# Patient Record
Sex: MALE | Race: WHITE | NOT HISPANIC OR LATINO | Employment: OTHER | URBAN - METROPOLITAN AREA
[De-identification: names, ages, dates, MRNs, and addresses within clinical notes are randomized per-mention and may not be internally consistent; named-entity substitution may affect disease eponyms.]

---

## 2018-07-12 ENCOUNTER — TRANSFERRED RECORDS (OUTPATIENT)
Dept: HEALTH INFORMATION MANAGEMENT | Facility: CLINIC | Age: 73
End: 2018-07-12

## 2019-10-21 ENCOUNTER — TRANSFERRED RECORDS (OUTPATIENT)
Dept: HEALTH INFORMATION MANAGEMENT | Facility: CLINIC | Age: 74
End: 2019-10-21

## 2019-11-07 ENCOUNTER — TRANSFERRED RECORDS (OUTPATIENT)
Dept: HEALTH INFORMATION MANAGEMENT | Facility: CLINIC | Age: 74
End: 2019-11-07

## 2019-12-06 ENCOUNTER — TRANSFERRED RECORDS (OUTPATIENT)
Dept: HEALTH INFORMATION MANAGEMENT | Facility: CLINIC | Age: 74
End: 2019-12-06

## 2020-01-25 ENCOUNTER — TRANSFERRED RECORDS (OUTPATIENT)
Dept: HEALTH INFORMATION MANAGEMENT | Facility: CLINIC | Age: 75
End: 2020-01-25

## 2020-03-31 ENCOUNTER — TRANSFERRED RECORDS (OUTPATIENT)
Dept: HEALTH INFORMATION MANAGEMENT | Facility: CLINIC | Age: 75
End: 2020-03-31

## 2020-04-09 ENCOUNTER — TRANSFERRED RECORDS (OUTPATIENT)
Dept: HEALTH INFORMATION MANAGEMENT | Facility: CLINIC | Age: 75
End: 2020-04-09

## 2020-04-16 ENCOUNTER — TRANSFERRED RECORDS (OUTPATIENT)
Dept: HEALTH INFORMATION MANAGEMENT | Facility: CLINIC | Age: 75
End: 2020-04-16

## 2020-07-16 ENCOUNTER — TRANSFERRED RECORDS (OUTPATIENT)
Dept: HEALTH INFORMATION MANAGEMENT | Facility: CLINIC | Age: 75
End: 2020-07-16

## 2020-09-16 ENCOUNTER — TRANSFERRED RECORDS (OUTPATIENT)
Dept: HEALTH INFORMATION MANAGEMENT | Facility: CLINIC | Age: 75
End: 2020-09-16

## 2021-01-15 ENCOUNTER — TRANSFERRED RECORDS (OUTPATIENT)
Dept: HEALTH INFORMATION MANAGEMENT | Facility: CLINIC | Age: 76
End: 2021-01-15

## 2021-02-23 ENCOUNTER — TRANSFERRED RECORDS (OUTPATIENT)
Dept: HEALTH INFORMATION MANAGEMENT | Facility: CLINIC | Age: 76
End: 2021-02-23

## 2021-02-25 ENCOUNTER — TRANSFERRED RECORDS (OUTPATIENT)
Dept: HEALTH INFORMATION MANAGEMENT | Facility: CLINIC | Age: 76
End: 2021-02-25

## 2021-05-25 ENCOUNTER — RECORDS - HEALTHEAST (OUTPATIENT)
Dept: ADMINISTRATIVE | Facility: CLINIC | Age: 76
End: 2021-05-25

## 2021-06-01 ENCOUNTER — TRANSFERRED RECORDS (OUTPATIENT)
Dept: HEALTH INFORMATION MANAGEMENT | Facility: CLINIC | Age: 76
End: 2021-06-01

## 2021-06-02 ENCOUNTER — TRANSFERRED RECORDS (OUTPATIENT)
Dept: HEALTH INFORMATION MANAGEMENT | Facility: CLINIC | Age: 76
End: 2021-06-02

## 2021-07-26 ENCOUNTER — TRANSFERRED RECORDS (OUTPATIENT)
Dept: HEALTH INFORMATION MANAGEMENT | Facility: CLINIC | Age: 76
End: 2021-07-26

## 2021-09-27 LAB
CHOLESTEROL (EXTERNAL): 154 MG/DL
HDLC SERPL-MCNC: 35 MG/DL
NON HDL CHOLESTEROL (EXTERNAL): 119 MG/DL
TRIGLYCERIDES (EXTERNAL): 280 MG/DL

## 2021-10-11 ENCOUNTER — TRANSFERRED RECORDS (OUTPATIENT)
Dept: HEALTH INFORMATION MANAGEMENT | Facility: CLINIC | Age: 76
End: 2021-10-11

## 2022-05-20 ENCOUNTER — TRANSFERRED RECORDS (OUTPATIENT)
Dept: HEALTH INFORMATION MANAGEMENT | Facility: CLINIC | Age: 77
End: 2022-05-20

## 2022-07-26 ENCOUNTER — TRANSFERRED RECORDS (OUTPATIENT)
Dept: HEALTH INFORMATION MANAGEMENT | Facility: CLINIC | Age: 77
End: 2022-07-26

## 2022-10-20 ENCOUNTER — TRANSFERRED RECORDS (OUTPATIENT)
Dept: HEALTH INFORMATION MANAGEMENT | Facility: CLINIC | Age: 77
End: 2022-10-20

## 2022-10-22 LAB
CHOLESTEROL (EXTERNAL): 130 MG/DL
HDLC SERPL-MCNC: 36 MG/DL
LDL CHOLESTEROL DIRECT (EXTERNAL): 54 MG/DL
LDL CHOLESTEROL DIRECT (EXTERNAL): 54 MG/DL
NON HDL CHOLESTEROL (EXTERNAL): 95 MG/DL
TRIGLYCERIDES (EXTERNAL): 233 MG/DL

## 2023-01-13 ENCOUNTER — TRANSFERRED RECORDS (OUTPATIENT)
Dept: HEALTH INFORMATION MANAGEMENT | Facility: CLINIC | Age: 78
End: 2023-01-13

## 2023-01-17 LAB
CREATININE (EXTERNAL): 1.1 MG/DL (ref 0.7–1.2)
GFR ESTIMATED (EXTERNAL): >60 ML/MIN/1.73M2
GLUCOSE (EXTERNAL): 136 MG/DL (ref 64–109)
POTASSIUM (EXTERNAL): 3.6 MMOL/L (ref 3.5–5.1)

## 2023-03-22 ENCOUNTER — TRANSFERRED RECORDS (OUTPATIENT)
Dept: HEALTH INFORMATION MANAGEMENT | Facility: CLINIC | Age: 78
End: 2023-03-22

## 2023-06-19 LAB
ALT SERPL-CCNC: 12 U/L (ref 0–41)
AST SERPL-CCNC: 16 U/L (ref 0–37)
CREATININE (EXTERNAL): 0.9 MG/DL (ref 0.7–1.2)
GFR ESTIMATED (EXTERNAL): >60 ML/MIN/1.73M2
GLUCOSE (EXTERNAL): 101 MG/DL (ref 64–109)
POTASSIUM (EXTERNAL): 3.7 MMOL/L (ref 3.5–5.1)

## 2023-06-20 ENCOUNTER — TRANSFERRED RECORDS (OUTPATIENT)
Dept: HEALTH INFORMATION MANAGEMENT | Facility: CLINIC | Age: 78
End: 2023-06-20

## 2023-07-31 ENCOUNTER — TRANSCRIBE ORDERS (OUTPATIENT)
Dept: OTHER | Age: 78
End: 2023-07-31

## 2023-07-31 DIAGNOSIS — K52.9 CHRONIC DIARRHEA: Primary | ICD-10-CM

## 2023-08-01 ENCOUNTER — TELEPHONE (OUTPATIENT)
Dept: SURGERY | Facility: CLINIC | Age: 78
End: 2023-08-01
Payer: COMMERCIAL

## 2023-08-01 NOTE — TELEPHONE ENCOUNTER
Screening Questions  BLUE  KIND OF PREP RED  LOCATION [review exclusion criteria] GREEN  SEDATION TYPE        N Are you active on mychart?       Trevor  Ordering/Referring Provider?        Medica/Medicare What type of coverage do you have?      n Have you had a positive covid test in the last 14 days?     NOF 1. BMI  [BMI 40+ - review exclusion criteria& smart-phrase document]    Y  2. Are you able to give consent for your medical care? [IF NO,RN REVIEW]          N  3. Are you taking any prescription pain medications on a routine schedule   (ex narcotics: oxycodone, roxicodone, oxycontin,  and percocet)? [RN Review]        N  3a. EXTENDED PREP What kind of prescription?     N 4. Do you have any chemical dependencies such as alcohol, street drugs, or methadone?        **If yes 3- 5 , please schedule with MAC sedation.**          IF YES TO ANY 6 - 10 - HOSPITAL SETTING ONLY.     N 6.   Do you need assistance transferring?     N 7.   Have you had a heart or lung transplant?    N 8.   Are you currently on dialysis?   N 9.   Do you use daily home oxygen?   N 10. Do you take nitroglycerin?   10a. N If yes, how often?     N 11. Are you currently pregnant?    11a. N If yes, how many weeks? [ Greater than 12 weeks, OR NEEDED]    N 12. Do you have Pulmonary Hypertension? *NEED PAC APPT AT [facility] w/ MAC*     N 13. [review exclusion criteria]  Do you have any implantable devices in your body (pacemaker, defib, LVAD)?    N 14. In the past 6 months, have you had any heart related issues including cardiomyopathy or heart attack?     14a. N If yes, did it require cardiac stenting if so when?     N 15. Have you had a stroke or Transient ischemic attack (TIA - aka  mini stroke ) within 6 months?      N 16. Do you have mod to severe Obstructive Sleep Apnea?  [Hospital only]    N 17. Do you have SEVERE AND UNCONTROLLED asthma? *NEED PAC APPT AT [facility] w/MAC*     18.Do you take blood thinners?  No      N 19. Do you take any of the  "following medications?    NPhentermine     NWegovy (Semaglutide)      N  21. Do you have a diagnosis of diabetes?      nOzempic  nTrulicity    n*Bydureon (Exertidate ER)    nMounjaro (Tirzepatdine)    nRybelsus     19a. If yes, \"Hold for 7 days before procedure.  Please consult your prescribing provider if you have questions about holding this medication.\"     HOLD THE FOLLOWING THE DAY BEFORE AND THE DAY OFF:        NByetta (Exenatide)    nSaxano-Victoza (Linaglutied)      N  20. Do you have chronic kidney disease?      N  22. On a regular basis do you go 3-5 days between bowel movements?     See below 23. Preferred LOCAL Pharmacy for Pre Prescription      [facility]        - CLOSING REMINDERS -  You will receive a call from a Nurse to review instructions and health history.  This assessment must be completed prior to your procedure.  Failure to complete the Nurse assessment may result in the procedure being cancelled.    On the day of your procedure, please designatean adult(s) who can drive you home stay with you for the next 24 hours. The medicines used in the exam will make you sleepy. You will not be able to drive.    You cannot take public transportation, ride share services, or non-medical taxi service without a responsible caregiver.  Medical transport services are allowed with the requirement that a responsible caregiver will receive you at your destination.  We require that drivers and caregivers are confirmed prior to your procedure.      - SCHEDULING DETAILS -  N & N Hospital Setting Required & If yes, what is the exclusion?   Lyons  Surgeon    8-16-23  Date of Procedure  Lower Endoscopy [Colonoscopy]  Type of Procedure Scheduled  [facility]   STANDARD GOLYTELY- If you answer yes to questions #8, #20, #21 [  pts ]Which Colonoscopy Prep was Sent?     GEN Sedation Type     N PAC / Pre-op Required     Weight and Height not listed mailing Standard " Prep

## 2023-08-11 RX ORDER — BISACODYL 5 MG/1
TABLET, DELAYED RELEASE ORAL
Qty: 4 TABLET | Refills: 0 | Status: SHIPPED | OUTPATIENT
Start: 2023-08-11 | End: 2024-01-03

## 2023-08-15 ENCOUNTER — ANESTHESIA EVENT (OUTPATIENT)
Dept: GASTROENTEROLOGY | Facility: CLINIC | Age: 78
End: 2023-08-15
Payer: COMMERCIAL

## 2023-08-15 ASSESSMENT — COPD QUESTIONNAIRES: COPD: 1

## 2023-08-15 NOTE — ANESTHESIA PREPROCEDURE EVALUATION
Anesthesia Pre-Procedure Evaluation    Patient: Peña Hall   MRN: [id number] : 1945        Procedure : Procedure(s):  Colonoscopy          No past medical history on file.   No past surgical history on file.   No Known Allergies   Social History     Tobacco Use    Smoking status: Not on file    Smokeless tobacco: Not on file   Substance Use Topics    Alcohol use: Not on file      Wt Readings from Last 1 Encounters:   No data found for Wt        Anesthesia Evaluation   Pt has had prior anesthetic. Type: General, MAC and Regional.        ROS/MED HX  ENT/Pulmonary:     (+)     DALE risk factors,                    COPD,              Neurologic:       Cardiovascular:     (+) Dyslipidemia hypertension- -   -  - -           HENRY.                           METS/Exercise Tolerance:     Hematologic:       Musculoskeletal: Comment: DDD    (+)  arthritis,             GI/Hepatic:       Renal/Genitourinary:       Endo:     (+)               Obesity,       Psychiatric/Substance Use:       Infectious Disease:       Malignancy:   (+) Malignancy,     Other:            Physical Exam    Airway        Mallampati: I   TM distance: > 3 FB   Neck ROM: full     Respiratory Devices and Support         Dental       (+) Multiple visibly decayed, broken teeth      Cardiovascular   cardiovascular exam normal       Rhythm and rate: regular and normal     Pulmonary           breath sounds clear to auscultation           OUTSIDE LABS:  CBC: No results found for: WBC, HGB, HCT, PLT  BMP: No results found for: NA, POTASSIUM, CHLORIDE, CO2, BUN, CR, GLC  COAGS: No results found for: PTT, INR, FIBR  POC: No results found for: BGM, HCG, HCGS  HEPATIC: No results found for: ALBUMIN, PROTTOTAL, ALT, AST, GGT, ALKPHOS, BILITOTAL, BILIDIRECT, EZE  OTHER: No results found for: PH, LACT, A1C, SARAVANAN, PHOS, MAG, LIPASE, AMYLASE, TSH, T4, T3, CRP, SED    Anesthesia Plan    ASA Status:  3    NPO Status:  NPO Appropriate    Anesthesia Type: General.      - Airway: Native airway   Induction: Propofol.   Maintenance: TIVA.        Consents    Anesthesia Plan(s) and associated risks, benefits, and realistic alternatives discussed. Questions answered and patient/representative(s) expressed understanding.     - Discussed: Risks, Benefits and Alternatives for BOTH SEDATION and the PROCEDURE were discussed     - Discussed with:  Patient      - Extended Intubation/Ventilatory Support Discussed: No.      - Patient is DNR/DNI Status: No     Use of blood products discussed: No .     Postoperative Care            Comments:                YECENIA Adhikari CRNA

## 2023-08-18 ENCOUNTER — HOSPITAL ENCOUNTER (OUTPATIENT)
Facility: CLINIC | Age: 78
Discharge: HOME | End: 2023-08-18
Attending: SURGERY | Admitting: SURGERY
Payer: COMMERCIAL

## 2023-08-18 ENCOUNTER — ANESTHESIA (OUTPATIENT)
Dept: GASTROENTEROLOGY | Facility: CLINIC | Age: 78
End: 2023-08-18
Payer: COMMERCIAL

## 2023-08-18 VITALS
BODY MASS INDEX: 31.08 KG/M2 | OXYGEN SATURATION: 96 % | WEIGHT: 198 LBS | RESPIRATION RATE: 16 BRPM | HEART RATE: 65 BPM | DIASTOLIC BLOOD PRESSURE: 70 MMHG | SYSTOLIC BLOOD PRESSURE: 129 MMHG | HEIGHT: 67 IN | TEMPERATURE: 97.6 F

## 2023-08-18 DIAGNOSIS — Z12.11 SPECIAL SCREENING FOR MALIGNANT NEOPLASMS, COLON: Primary | ICD-10-CM

## 2023-08-18 LAB — COLONOSCOPY: NORMAL

## 2023-08-18 PROCEDURE — 45380 COLONOSCOPY AND BIOPSY: CPT

## 2023-08-18 PROCEDURE — 88305 TISSUE EXAM BY PATHOLOGIST: CPT | Mod: TC | Performed by: SURGERY

## 2023-08-18 PROCEDURE — 45385 COLONOSCOPY W/LESION REMOVAL: CPT | Performed by: SURGERY

## 2023-08-18 PROCEDURE — 45380 COLONOSCOPY AND BIOPSY: CPT | Mod: 59 | Performed by: SURGERY

## 2023-08-18 PROCEDURE — 250N000011 HC RX IP 250 OP 636: Performed by: NURSE ANESTHETIST, CERTIFIED REGISTERED

## 2023-08-18 PROCEDURE — 250N000009 HC RX 250: Performed by: SURGERY

## 2023-08-18 PROCEDURE — 45380 COLONOSCOPY AND BIOPSY: CPT | Performed by: SURGERY

## 2023-08-18 PROCEDURE — 45385 COLONOSCOPY W/LESION REMOVAL: CPT | Mod: PT | Performed by: SURGERY

## 2023-08-18 PROCEDURE — 370N000017 HC ANESTHESIA TECHNICAL FEE, PER MIN: Performed by: SURGERY

## 2023-08-18 PROCEDURE — 258N000003 HC RX IP 258 OP 636: Performed by: SURGERY

## 2023-08-18 RX ORDER — HYDROCHLOROTHIAZIDE 25 MG/1
1 TABLET ORAL DAILY
COMMUNITY
Start: 2022-11-01 | End: 2024-01-03

## 2023-08-18 RX ORDER — TAMSULOSIN HYDROCHLORIDE 0.4 MG/1
0.8 CAPSULE ORAL DAILY
COMMUNITY
Start: 2022-11-01 | End: 2024-01-03

## 2023-08-18 RX ORDER — PROPOFOL 10 MG/ML
INJECTION, EMULSION INTRAVENOUS CONTINUOUS PRN
Status: DISCONTINUED | OUTPATIENT
Start: 2023-08-18 | End: 2023-08-18

## 2023-08-18 RX ORDER — ONDANSETRON 2 MG/ML
4 INJECTION INTRAMUSCULAR; INTRAVENOUS EVERY 30 MIN PRN
Status: DISCONTINUED | OUTPATIENT
Start: 2023-08-18 | End: 2023-08-18 | Stop reason: HOSPADM

## 2023-08-18 RX ORDER — LIDOCAINE 40 MG/G
CREAM TOPICAL
Status: DISCONTINUED | OUTPATIENT
Start: 2023-08-18 | End: 2023-08-18 | Stop reason: HOSPADM

## 2023-08-18 RX ORDER — ATORVASTATIN CALCIUM 20 MG/1
1 TABLET, FILM COATED ORAL EVERY EVENING
COMMUNITY
Start: 2022-11-01 | End: 2024-01-03

## 2023-08-18 RX ORDER — PROPRANOLOL HYDROCHLORIDE 10 MG/1
10 TABLET ORAL
Status: ON HOLD | COMMUNITY
Start: 2022-11-01 | End: 2023-10-22

## 2023-08-18 RX ORDER — MELOXICAM 15 MG/1
1 TABLET ORAL DAILY
COMMUNITY
Start: 2022-11-01 | End: 2024-01-19

## 2023-08-18 RX ORDER — SODIUM CHLORIDE, SODIUM LACTATE, POTASSIUM CHLORIDE, CALCIUM CHLORIDE 600; 310; 30; 20 MG/100ML; MG/100ML; MG/100ML; MG/100ML
INJECTION, SOLUTION INTRAVENOUS CONTINUOUS
Status: DISCONTINUED | OUTPATIENT
Start: 2023-08-18 | End: 2023-08-18 | Stop reason: HOSPADM

## 2023-08-18 RX ORDER — GABAPENTIN 300 MG/1
1 CAPSULE ORAL EVERY EVENING
Status: ON HOLD | COMMUNITY
Start: 2022-11-01 | End: 2023-10-22

## 2023-08-18 RX ORDER — ONDANSETRON 4 MG/1
4 TABLET, ORALLY DISINTEGRATING ORAL EVERY 30 MIN PRN
Status: DISCONTINUED | OUTPATIENT
Start: 2023-08-18 | End: 2023-08-18 | Stop reason: HOSPADM

## 2023-08-18 RX ORDER — ALBUTEROL SULFATE 90 UG/1
AEROSOL, METERED RESPIRATORY (INHALATION)
COMMUNITY
Start: 2021-10-08 | End: 2024-01-03

## 2023-08-18 RX ORDER — GABAPENTIN 100 MG/1
100 CAPSULE ORAL EVERY MORNING
COMMUNITY
Start: 2022-11-01 | End: 2024-01-03

## 2023-08-18 RX ORDER — LISINOPRIL 40 MG/1
1 TABLET ORAL DAILY
COMMUNITY
Start: 2022-11-01 | End: 2024-01-03

## 2023-08-18 RX ADMIN — PROPOFOL 150 MCG/KG/MIN: 10 INJECTION, EMULSION INTRAVENOUS at 10:50

## 2023-08-18 RX ADMIN — LIDOCAINE HYDROCHLORIDE 50 MG: 10 INJECTION, SOLUTION EPIDURAL; INFILTRATION; INTRACAUDAL; PERINEURAL at 10:50

## 2023-08-18 RX ADMIN — SODIUM CHLORIDE, POTASSIUM CHLORIDE, SODIUM LACTATE AND CALCIUM CHLORIDE: 600; 310; 30; 20 INJECTION, SOLUTION INTRAVENOUS at 10:30

## 2023-08-18 RX ADMIN — LIDOCAINE HYDROCHLORIDE 0.1 ML: 10 INJECTION, SOLUTION EPIDURAL; INFILTRATION; INTRACAUDAL; PERINEURAL at 10:30

## 2023-08-18 ASSESSMENT — ACTIVITIES OF DAILY LIVING (ADL): ADLS_ACUITY_SCORE: 35

## 2023-08-18 NOTE — ANESTHESIA POSTPROCEDURE EVALUATION
Patient: Peña Hall    Procedure: Procedure(s):  COLONOSCOPY, WITH POLYPECTOMY AND BIOPSY  COLONOSCOPY, FLEXIBLE, WITH LESION REMOVAL USING SNARE       Anesthesia Type:  General    Note:  Disposition: Outpatient   Postop Pain Control: Uneventful            Sign Out: Well controlled pain   PONV: No   Neuro/Psych: Uneventful            Sign Out: Acceptable/Baseline neuro status   Airway/Respiratory: Uneventful            Sign Out: Acceptable/Baseline resp. status   CV/Hemodynamics: Uneventful            Sign Out: Acceptable CV status; No obvious hypovolemia; No obvious fluid overload   Other NRE: NONE   DID A NON-ROUTINE EVENT OCCUR? No           Last vitals:  Vitals Value Taken Time   /70 08/18/23 1140   Temp 36.4  C (97.6  F) 08/18/23 1121   Pulse 65 08/18/23 1140   Resp 16 08/18/23 1121   SpO2 93 % 08/18/23 1143   Vitals shown include unvalidated device data.    Electronically Signed By: YECENIA Pereira CRNA  August 18, 2023  11:48 AM

## 2023-08-18 NOTE — LETTER
Peña Hall  [id number] [facility] [address]    August 22, 2023    Dear Peña,  This letter is written to inform you of the results of your recent colonoscopy.  Your examination showed polyp(s) in your descending colon. All polyps were removed in their entirety and sent for review by a pathologist. As you will see on the pathology report below, the tissue(s) were tubular adenomatous polyps. Your examination was otherwise without abnormality, however, biopsies were done of the entire colon if there is any underlying cause for your diarrhea.  Final pathology did not show any abnormalities.    A: Large intestine, random, biopsies:  -  Normal colonic mucosa showing no evidence of collagenous or lymphocytic (microscopic) colitis, active or chronic colitis or other microscopic abnormality     B: Large intestine, descending, polypectomy:  -Focal adenomatous change consistent with tubular adenoma, no evidence of high-grade dysplasia or malignancy  -Majority of tissue sample is nonneoplastic colonic mucosa showing no evidence of lymphocytic colitis, collagenous colitis, active or chronic colitis    Adenomatous polyps are entirely benign (non-cancerous); however, patients who have developed these polyps are at an increased risk for developing additional polyps in the future. If these are not eventually removed, there is a risk of developing colon cancer. We will advise more frequent examinations with you because of the risk associated with this type of polyp.    Given these findings, your personal history of colon polyps and colon cancer, I recommend that you undergo a repeat colonoscopy in 5 year(s) for surveillance. We will enter you into a recall system so you receive a reminder closer to the time that you are due for repeat examination.     Please remember that this recommendation is made with the understanding that you are not experiencing persistent changes in bowel function, bleeding per rectum, and/or  significant abdominal pain. If you experience these symptoms, please contact your primary care provider for a further evaluation.     If you have any questions or concerns about the results of your colonoscopy or the appropriate follow-up, please contact my assistant at [phone].    Sincerely,        [facility] DO Lyons FACOS [facility]  ___

## 2023-08-18 NOTE — ANESTHESIA CARE TRANSFER NOTE
Patient: Peña Hall    Procedure: Procedure(s):  COLONOSCOPY, WITH POLYPECTOMY AND BIOPSY  COLONOSCOPY, FLEXIBLE, WITH LESION REMOVAL USING SNARE       Diagnosis: Chronic diarrhea [K52.9]  Diagnosis Additional Information: No value filed.    Anesthesia Type:   General     Note:    Oropharynx: oropharynx clear of all foreign objects  Level of Consciousness: awake  Oxygen Supplementation: room air    Independent Airway: airway patency satisfactory and stable  Dentition: dentition unchanged  Vital Signs Stable: post-procedure vital signs reviewed and stable  Report to RN Given: handoff report given  Patient transferred to: Phase II    Handoff Report: Identifed the Patient, Identified the Reponsible Provider, Reviewed the pertinent medical history, Discussed the surgical course, Reviewed Intra-OP anesthesia mangement and issues during anesthesia, Set expectations for post-procedure period and Allowed opportunity for questions and acknowledgement of understanding      Vitals:  Vitals Value Taken Time   /56 08/18/23 1120   Temp     Pulse 62 08/18/23 1120   Resp     SpO2 96 % 08/18/23 1126   Vitals shown include unvalidated device data.    Electronically Signed By: YECENIA Pereira CRNA  August 18, 2023  11:26 AM

## 2023-08-18 NOTE — H&P
[facility]    Pre-Endoscopy History and Physical     Peña Hall MRN# [id number]   YOB: 1945 Age: 78 year old     Date of Procedure: 8/18/2023  Primary care provider: Marli Lyons  Type of Endoscopy: Colonoscopy with possible biopsy, possible polypectomy  Reason for Procedure: screening/diarrhea  Type of Anesthesia Anticipated: MAC    HPI:    Peña is a 78 year old male who will be undergoing the above procedure.  1st screening?; no blood thinner; chronic diarrhea; no famhx of colon cancer    A history and physical has been performed. The patient's medications and allergies have been reviewed. The risks and benefits of the procedure and the sedation options and risks were discussed with the patient.  All questions were answered and informed consent was obtained.      He denies a personal or family history of anesthesia complications or bleeding disorders.     There is no problem list on file for this patient.       No past medical history on file.     History reviewed. No pertinent surgical history.    Social History     Tobacco Use    Smoking status: Not on file    Smokeless tobacco: Not on file   Substance Use Topics    Alcohol use: Not on file       History reviewed. No pertinent family history.    Prior to Admission medications    Medication Sig Start Date End Date Taking? Authorizing Provider   albuterol (PROAIR HFA/PROVENTIL HFA/VENTOLIN HFA) 108 (90 Base) MCG/ACT inhaler Inhale 2 puff using inhaler every four hours as needed for wheezing. pretreat before activity 10/8/21  Yes Reported, Patient   atorvastatin (LIPITOR) 20 MG tablet Take 1 tablet by mouth every evening 11/1/22  Yes Reported, Patient   bisacodyl (DULCOLAX) 5 MG EC tablet Take 2 tablets at 3 pm the day before your procedure. If your procedure is before 11 am, take 2 additional tablets at 11 pm. If your procedure is after 11 am, take 2 additional tablets at 6 am. For additional instructions refer to your  "colonoscopy prep instructions. 8/11/23  Yes Marli Lyons MD   gabapentin (NEURONTIN) 100 MG capsule Take 100 mg by mouth 11/1/22  Yes Reported, Patient   gabapentin (NEURONTIN) 300 MG capsule Take 1 capsule by mouth every evening 11/1/22  Yes Reported, Patient   hydrochlorothiazide (HYDRODIURIL) 25 MG tablet Take 1 tablet by mouth daily 11/1/22  Yes Reported, Patient   lisinopril (ZESTRIL) 40 MG tablet Take 1 tablet by mouth daily 11/1/22  Yes Reported, Patient   meloxicam (MOBIC) 15 MG tablet Take 1 tablet by mouth daily 11/1/22  Yes Reported, Patient   omeprazole (PRILOSEC) 20 MG DR capsule Take 20 mg by mouth 10/8/21  Yes Reported, Patient   polyethylene glycol (GOLYTELY) 236 g suspension The night before the exam at 6 pm drink an 8-ounce glass every 15 minutes until the jug is half empty. If you arrive before 11 AM: Drink the other half of the Golytely jug at 11 PM night before procedure. If you arrive after 11 AM: Drink the other half of the Golytely jug at 6 AM day of procedure. For additional instructions refer to your colonoscopy prep instructions. 8/11/23  Yes Marli Lyons MD   propranolol (INDERAL) 10 MG tablet Take 10 mg by mouth 11/1/22  Yes Reported, Patient   tamsulosin (FLOMAX) 0.4 MG capsule Take 0.8 mg by mouth 11/1/22  Yes Reported, Patient       No Known Allergies     REVIEW OF SYSTEMS:   5 point ROS negative except as noted above in HPI, including Gen., Resp., CV, GI &  system review.    PHYSICAL EXAM:   BP (!) 153/86   Pulse 68   Temp 98.7  F (37.1  C) (Oral)   Resp 17   Ht 1.689 m (5' 6.5\")   Wt 89.8 kg (198 lb)   SpO2 97%   BMI 31.48 kg/m   Estimated body mass index is 31.48 kg/m  as calculated from the following:    Height as of this encounter: 1.689 m (5' 6.5\").    Weight as of this encounter: 89.8 kg (198 lb).   Constitutional: Awake, alert, no acute distress.  Eyes: No scleral icterus.  Conjunctiva are without injection.  ENMT: Mucous membranes moist, dentition and gums are " intact.   Neck: Soft, supple, trachea midline.    Endocrine: n/a   Lymphatic: There is no cervical, submandibularadenopathy.  Respiratory: normal effortgs   Cardiovascular: S1, S2  Abdomen: Non-distended, non-tender,  No masses,  Musculoskeletal: No spinal or CVA tenderness. Full range of motion in the upper and lower extremities.    Skin: No skin rashes or lesions to inspection.  No petechia.    Neurologic: alerted and oriented 3x  Psychiatric: The patient's affect is not blunted and mood is appropriate.  DIAGNOSTICS:    Not indicated    IMPRESSION   ASA Class 2 - Mild systemic disease    PLAN:   Plan for Colonoscopy with possible biopsy, possible polypectomy. We discussed the risks, benefits and alternatives and the patient wished to proceed.  Patient is cleared for the above procedure.    The above has been forwarded to the consulting provider.    [facility]

## 2023-08-21 LAB
PATH REPORT.COMMENTS IMP SPEC: NORMAL
PATH REPORT.COMMENTS IMP SPEC: NORMAL
PATH REPORT.FINAL DX SPEC: NORMAL
PATH REPORT.GROSS SPEC: NORMAL
PATH REPORT.MICROSCOPIC SPEC OTHER STN: NORMAL
PATH REPORT.RELEVANT HX SPEC: NORMAL
PHOTO IMAGE: NORMAL

## 2023-08-21 PROCEDURE — 88305 TISSUE EXAM BY PATHOLOGIST: CPT | Mod: 26 | Performed by: PATHOLOGY

## 2023-10-11 ENCOUNTER — ALLIED HEALTH/NURSE VISIT (OUTPATIENT)
Dept: FAMILY MEDICINE | Facility: CLINIC | Age: 78
End: 2023-10-11
Payer: COMMERCIAL

## 2023-10-11 DIAGNOSIS — Z23 HIGH PRIORITY FOR 2019-NCOV VACCINE: ICD-10-CM

## 2023-10-11 DIAGNOSIS — Z23 NEED FOR PROPHYLACTIC VACCINATION AND INOCULATION AGAINST INFLUENZA: Primary | ICD-10-CM

## 2023-10-11 PROCEDURE — 90480 ADMN SARSCOV2 VAC 1/ONLY CMP: CPT

## 2023-10-11 PROCEDURE — G0008 ADMIN INFLUENZA VIRUS VAC: HCPCS | Mod: 59

## 2023-10-11 PROCEDURE — 99207 PR NO CHARGE NURSE ONLY: CPT

## 2023-10-11 PROCEDURE — 91320 SARSCV2 VAC 30MCG TRS-SUC IM: CPT

## 2023-10-11 PROCEDURE — 90662 IIV NO PRSV INCREASED AG IM: CPT

## 2023-10-11 NOTE — PROGRESS NOTES
Prior to immunization administration, verified patients identity using patient s name and date of birth. Please see Immunization Activity for additional information.     Screening Questionnaire for Adult Immunization    Are you sick today?   No   Do you have allergies to medications, food, a vaccine component or latex?   No   Have you ever had a serious reaction after receiving a vaccination?   No   Do you have a long-term health problem with heart, lung, kidney, or metabolic disease (e.g., diabetes), asthma, a blood disorder, no spleen, complement component deficiency, a cochlear implant, or a spinal fluid leak?  Are you on long-term aspirin therapy?   No   Do you have cancer, leukemia, HIV/AIDS, or any other immune system problem?   No   Do you have a parent, brother, or sister with an immune system problem?   No   In the past 3 months, have you taken medications that affect  your immune system, such as prednisone, other steroids, or anticancer drugs; drugs for the treatment of rheumatoid arthritis, Crohn s disease, or psoriasis; or have you had radiation treatments?   No   Have you had a seizure, or a brain or other nervous system problem?   No   During the past year, have you received a transfusion of blood or blood    products, or been given immune (gamma) globulin or antiviral drug?   No   For women: Are you pregnant or is there a chance you could become       pregnant during the next month?   No   Have you received any vaccinations in the past 4 weeks?   No     Immunization questionnaire answers were all negative.    I have reviewed the following standing orders:   This patient is due and qualifies for the Covid-19 vaccine.     Click here for COVID-19 Standing Order    I have reviewed the vaccines inclusion and exclusion criteria; No concerns regarding eligibility.     This patient is due and qualifies for the Influenza vaccine.    Click here for Influenza Vaccine Standing Order    I have reviewed the  vaccines inclusion and exclusion criteria; No concerns regarding eligibility.     Patient instructed to remain in clinic for 15 minutes afterwards, and to report any adverse reactions.     Screening performed by Bailee Kahler on 10/11/2023 at 9:43 AM.

## 2023-10-22 ENCOUNTER — APPOINTMENT (OUTPATIENT)
Dept: GENERAL RADIOLOGY | Facility: CLINIC | Age: 78
DRG: 389 | End: 2023-10-22
Attending: EMERGENCY MEDICINE
Payer: COMMERCIAL

## 2023-10-22 ENCOUNTER — HOSPITAL ENCOUNTER (INPATIENT)
Facility: CLINIC | Age: 78
LOS: 1 days | Discharge: HOME | DRG: 389 | End: 2023-10-23
Attending: EMERGENCY MEDICINE | Admitting: HOSPITALIST
Payer: COMMERCIAL

## 2023-10-22 ENCOUNTER — APPOINTMENT (OUTPATIENT)
Dept: CT IMAGING | Facility: CLINIC | Age: 78
DRG: 389 | End: 2023-10-22
Attending: EMERGENCY MEDICINE
Payer: COMMERCIAL

## 2023-10-22 DIAGNOSIS — N28.9 RENAL INSUFFICIENCY: ICD-10-CM

## 2023-10-22 DIAGNOSIS — K56.609 SBO (SMALL BOWEL OBSTRUCTION) (H): ICD-10-CM

## 2023-10-22 DIAGNOSIS — E87.6 HYPOKALEMIA: ICD-10-CM

## 2023-10-22 PROBLEM — I10 HTN (HYPERTENSION): Status: ACTIVE | Noted: 2023-10-22

## 2023-10-22 PROBLEM — E78.5 HYPERLIPIDEMIA LDL GOAL <130: Status: ACTIVE | Noted: 2023-10-22

## 2023-10-22 PROBLEM — N40.0 BPH (BENIGN PROSTATIC HYPERPLASIA): Status: ACTIVE | Noted: 2023-10-22

## 2023-10-22 PROBLEM — G89.29 CHRONIC MIDLINE LOW BACK PAIN WITHOUT SCIATICA: Chronic | Status: ACTIVE | Noted: 2023-10-22

## 2023-10-22 PROBLEM — K52.9 CHRONIC DIARRHEA: Chronic | Status: ACTIVE | Noted: 2023-10-22

## 2023-10-22 PROBLEM — M54.50 CHRONIC MIDLINE LOW BACK PAIN WITHOUT SCIATICA: Chronic | Status: ACTIVE | Noted: 2023-10-22

## 2023-10-22 PROBLEM — C18.9 COLON CANCER (H): Status: ACTIVE | Noted: 2023-10-22

## 2023-10-22 LAB
ALBUMIN SERPL BCG-MCNC: 4.1 G/DL (ref 3.5–5.2)
ALP SERPL-CCNC: 67 U/L (ref 40–129)
ALT SERPL W P-5'-P-CCNC: 19 U/L (ref 0–70)
ANION GAP SERPL CALCULATED.3IONS-SCNC: 16 MMOL/L (ref 7–15)
ANION GAP SERPL CALCULATED.3IONS-SCNC: 9 MMOL/L (ref 7–15)
AST SERPL W P-5'-P-CCNC: 27 U/L (ref 0–45)
BASO+EOS+MONOS # BLD AUTO: ABNORMAL 10*3/UL
BASO+EOS+MONOS NFR BLD AUTO: ABNORMAL %
BASOPHILS # BLD AUTO: 0 10E3/UL (ref 0–0.2)
BASOPHILS NFR BLD AUTO: 0 %
BILIRUB SERPL-MCNC: 0.7 MG/DL
BUN SERPL-MCNC: 32.1 MG/DL (ref 8–23)
BUN SERPL-MCNC: 33 MG/DL (ref 8–23)
CALCIUM SERPL-MCNC: 7.7 MG/DL (ref 8.8–10.2)
CALCIUM SERPL-MCNC: 8.5 MG/DL (ref 8.8–10.2)
CHLORIDE SERPL-SCNC: 90 MMOL/L (ref 98–107)
CHLORIDE SERPL-SCNC: 93 MMOL/L (ref 98–107)
CREAT SERPL-MCNC: 1.24 MG/DL (ref 0.67–1.17)
CREAT SERPL-MCNC: 1.37 MG/DL (ref 0.67–1.17)
DEPRECATED HCO3 PLAS-SCNC: 26 MMOL/L (ref 22–29)
DEPRECATED HCO3 PLAS-SCNC: 28 MMOL/L (ref 22–29)
EGFRCR SERPLBLD CKD-EPI 2021: 53 ML/MIN/1.73M2
EGFRCR SERPLBLD CKD-EPI 2021: 60 ML/MIN/1.73M2
EOSINOPHIL # BLD AUTO: 0.1 10E3/UL (ref 0–0.7)
EOSINOPHIL NFR BLD AUTO: 1 %
ERYTHROCYTE [DISTWIDTH] IN BLOOD BY AUTOMATED COUNT: 13.2 % (ref 10–15)
ERYTHROCYTE [DISTWIDTH] IN BLOOD BY AUTOMATED COUNT: 13.3 % (ref 10–15)
GLUCOSE SERPL-MCNC: 122 MG/DL (ref 70–99)
GLUCOSE SERPL-MCNC: 140 MG/DL (ref 70–99)
HCT VFR BLD AUTO: 35.6 % (ref 40–53)
HCT VFR BLD AUTO: 44.7 % (ref 40–53)
HGB BLD-MCNC: 12.4 G/DL (ref 13.3–17.7)
HGB BLD-MCNC: 15.3 G/DL (ref 13.3–17.7)
IMM GRANULOCYTES # BLD: 0 10E3/UL
IMM GRANULOCYTES NFR BLD: 0 %
LACTATE SERPL-SCNC: 1.9 MMOL/L (ref 0.7–2)
LACTATE SERPL-SCNC: 2.1 MMOL/L (ref 0.7–2)
LYMPHOCYTES # BLD AUTO: 0.4 10E3/UL (ref 0.8–5.3)
LYMPHOCYTES NFR BLD AUTO: 6 %
MCH RBC QN AUTO: 30.8 PG (ref 26.5–33)
MCH RBC QN AUTO: 31.1 PG (ref 26.5–33)
MCHC RBC AUTO-ENTMCNC: 34.2 G/DL (ref 31.5–36.5)
MCHC RBC AUTO-ENTMCNC: 34.8 G/DL (ref 31.5–36.5)
MCV RBC AUTO: 89 FL (ref 78–100)
MCV RBC AUTO: 90 FL (ref 78–100)
MONOCYTES # BLD AUTO: 0.1 10E3/UL (ref 0–1.3)
MONOCYTES NFR BLD AUTO: 2 %
NEUTROPHILS # BLD AUTO: 6.1 10E3/UL (ref 1.6–8.3)
NEUTROPHILS NFR BLD AUTO: 91 %
NRBC # BLD AUTO: 0 10E3/UL
NRBC BLD AUTO-RTO: 0 /100
PLATELET # BLD AUTO: 160 10E3/UL (ref 150–450)
PLATELET # BLD AUTO: 206 10E3/UL (ref 150–450)
POTASSIUM SERPL-SCNC: 3.1 MMOL/L (ref 3.4–5.3)
POTASSIUM SERPL-SCNC: 3.2 MMOL/L (ref 3.4–5.3)
POTASSIUM SERPL-SCNC: 3.8 MMOL/L (ref 3.4–5.3)
PROT SERPL-MCNC: 6.6 G/DL (ref 6.4–8.3)
RBC # BLD AUTO: 3.99 10E6/UL (ref 4.4–5.9)
RBC # BLD AUTO: 4.96 10E6/UL (ref 4.4–5.9)
SODIUM SERPL-SCNC: 130 MMOL/L (ref 135–145)
SODIUM SERPL-SCNC: 132 MMOL/L (ref 135–145)
WBC # BLD AUTO: 6.7 10E3/UL (ref 4–11)
WBC # BLD AUTO: 6.9 10E3/UL (ref 4–11)

## 2023-10-22 PROCEDURE — 250N000011 HC RX IP 250 OP 636: Performed by: EMERGENCY MEDICINE

## 2023-10-22 PROCEDURE — 36415 COLL VENOUS BLD VENIPUNCTURE: CPT | Performed by: EMERGENCY MEDICINE

## 2023-10-22 PROCEDURE — 74177 CT ABD & PELVIS W/CONTRAST: CPT

## 2023-10-22 PROCEDURE — 250N000011 HC RX IP 250 OP 636: Mod: JZ | Performed by: STUDENT IN AN ORGANIZED HEALTH CARE EDUCATION/TRAINING PROGRAM

## 2023-10-22 PROCEDURE — 85025 COMPLETE CBC W/AUTO DIFF WBC: CPT | Performed by: EMERGENCY MEDICINE

## 2023-10-22 PROCEDURE — 120N000004 HC R&B MS OVERFLOW

## 2023-10-22 PROCEDURE — 99207 PR APP CREDIT; MD BILLING SHARED VISIT: CPT | Performed by: STUDENT IN AN ORGANIZED HEALTH CARE EDUCATION/TRAINING PROGRAM

## 2023-10-22 PROCEDURE — 258N000003 HC RX IP 258 OP 636: Performed by: HOSPITALIST

## 2023-10-22 PROCEDURE — 99285 EMERGENCY DEPT VISIT HI MDM: CPT | Mod: 25 | Performed by: EMERGENCY MEDICINE

## 2023-10-22 PROCEDURE — 96368 THER/DIAG CONCURRENT INF: CPT | Mod: 59 | Performed by: EMERGENCY MEDICINE

## 2023-10-22 PROCEDURE — 84132 ASSAY OF SERUM POTASSIUM: CPT | Mod: 91 | Performed by: STUDENT IN AN ORGANIZED HEALTH CARE EDUCATION/TRAINING PROGRAM

## 2023-10-22 PROCEDURE — 250N000011 HC RX IP 250 OP 636: Mod: JZ | Performed by: EMERGENCY MEDICINE

## 2023-10-22 PROCEDURE — 74019 RADEX ABDOMEN 2 VIEWS: CPT

## 2023-10-22 PROCEDURE — 83605 ASSAY OF LACTIC ACID: CPT | Performed by: EMERGENCY MEDICINE

## 2023-10-22 PROCEDURE — 96365 THER/PROPH/DIAG IV INF INIT: CPT | Mod: 59 | Performed by: EMERGENCY MEDICINE

## 2023-10-22 PROCEDURE — 80051 ELECTROLYTE PANEL: CPT | Performed by: EMERGENCY MEDICINE

## 2023-10-22 PROCEDURE — 85027 COMPLETE CBC AUTOMATED: CPT | Mod: 59 | Performed by: HOSPITALIST

## 2023-10-22 PROCEDURE — 99285 EMERGENCY DEPT VISIT HI MDM: CPT | Performed by: EMERGENCY MEDICINE

## 2023-10-22 PROCEDURE — 96366 THER/PROPH/DIAG IV INF ADDON: CPT | Performed by: EMERGENCY MEDICINE

## 2023-10-22 PROCEDURE — 250N000009 HC RX 250: Performed by: EMERGENCY MEDICINE

## 2023-10-22 PROCEDURE — 36415 COLL VENOUS BLD VENIPUNCTURE: CPT | Performed by: STUDENT IN AN ORGANIZED HEALTH CARE EDUCATION/TRAINING PROGRAM

## 2023-10-22 PROCEDURE — 99223 1ST HOSP IP/OBS HIGH 75: CPT | Mod: 95 | Performed by: HOSPITALIST

## 2023-10-22 PROCEDURE — 250N000011 HC RX IP 250 OP 636: Mod: JZ | Performed by: HOSPITALIST

## 2023-10-22 PROCEDURE — 250N000013 HC RX MED GY IP 250 OP 250 PS 637: Performed by: HOSPITALIST

## 2023-10-22 PROCEDURE — 36415 COLL VENOUS BLD VENIPUNCTURE: CPT | Performed by: HOSPITALIST

## 2023-10-22 PROCEDURE — 250N000013 HC RX MED GY IP 250 OP 250 PS 637: Performed by: STUDENT IN AN ORGANIZED HEALTH CARE EDUCATION/TRAINING PROGRAM

## 2023-10-22 PROCEDURE — 96375 TX/PRO/DX INJ NEW DRUG ADDON: CPT | Mod: 59 | Performed by: EMERGENCY MEDICINE

## 2023-10-22 PROCEDURE — 258N000003 HC RX IP 258 OP 636: Performed by: EMERGENCY MEDICINE

## 2023-10-22 RX ORDER — GABAPENTIN 100 MG/1
100 CAPSULE ORAL DAILY
Status: DISCONTINUED | OUTPATIENT
Start: 2023-10-22 | End: 2023-10-23 | Stop reason: HOSPADM

## 2023-10-22 RX ORDER — ONDANSETRON 2 MG/ML
4 INJECTION INTRAMUSCULAR; INTRAVENOUS
Status: COMPLETED | OUTPATIENT
Start: 2023-10-22 | End: 2023-10-22

## 2023-10-22 RX ORDER — PROPRANOLOL HCL 60 MG
60 CAPSULE, EXTENDED RELEASE 24HR ORAL DAILY
COMMUNITY
Start: 2023-10-05 | End: 2024-01-03

## 2023-10-22 RX ORDER — NALOXONE HYDROCHLORIDE 0.4 MG/ML
0.2 INJECTION, SOLUTION INTRAMUSCULAR; INTRAVENOUS; SUBCUTANEOUS
Status: DISCONTINUED | OUTPATIENT
Start: 2023-10-22 | End: 2023-10-23 | Stop reason: HOSPADM

## 2023-10-22 RX ORDER — TAMSULOSIN HYDROCHLORIDE 0.4 MG/1
0.8 CAPSULE ORAL EVERY EVENING
Status: DISCONTINUED | OUTPATIENT
Start: 2023-10-22 | End: 2023-10-23 | Stop reason: HOSPADM

## 2023-10-22 RX ORDER — ONDANSETRON 2 MG/ML
4 INJECTION INTRAMUSCULAR; INTRAVENOUS EVERY 6 HOURS PRN
Status: DISCONTINUED | OUTPATIENT
Start: 2023-10-22 | End: 2023-10-23

## 2023-10-22 RX ORDER — ENOXAPARIN SODIUM 100 MG/ML
40 INJECTION SUBCUTANEOUS EVERY 24 HOURS
Status: DISCONTINUED | OUTPATIENT
Start: 2023-10-22 | End: 2023-10-23 | Stop reason: HOSPADM

## 2023-10-22 RX ORDER — HYDROMORPHONE HCL IN WATER/PF 6 MG/30 ML
0.2 PATIENT CONTROLLED ANALGESIA SYRINGE INTRAVENOUS
Status: DISCONTINUED | OUTPATIENT
Start: 2023-10-22 | End: 2023-10-23 | Stop reason: HOSPADM

## 2023-10-22 RX ORDER — POTASSIUM CHLORIDE 7.45 MG/ML
10 INJECTION INTRAVENOUS ONCE
Status: COMPLETED | OUTPATIENT
Start: 2023-10-22 | End: 2023-10-22

## 2023-10-22 RX ORDER — DEXTROSE, SODIUM CHLORIDE, SODIUM LACTATE, POTASSIUM CHLORIDE, AND CALCIUM CHLORIDE 5; .6; .31; .03; .02 G/100ML; G/100ML; G/100ML; G/100ML; G/100ML
INJECTION, SOLUTION INTRAVENOUS CONTINUOUS
Status: DISCONTINUED | OUTPATIENT
Start: 2023-10-22 | End: 2023-10-22

## 2023-10-22 RX ORDER — POTASSIUM CHLORIDE 7.45 MG/ML
10 INJECTION INTRAVENOUS
Status: COMPLETED | OUTPATIENT
Start: 2023-10-22 | End: 2023-10-22

## 2023-10-22 RX ORDER — IOPAMIDOL 755 MG/ML
100 INJECTION, SOLUTION INTRAVASCULAR ONCE
Status: COMPLETED | OUTPATIENT
Start: 2023-10-22 | End: 2023-10-22

## 2023-10-22 RX ORDER — ACETAMINOPHEN 325 MG/1
650 TABLET ORAL EVERY 6 HOURS PRN
Status: DISCONTINUED | OUTPATIENT
Start: 2023-10-22 | End: 2023-10-23 | Stop reason: HOSPADM

## 2023-10-22 RX ORDER — ONDANSETRON 4 MG/1
4 TABLET, ORALLY DISINTEGRATING ORAL EVERY 6 HOURS PRN
Status: DISCONTINUED | OUTPATIENT
Start: 2023-10-22 | End: 2023-10-23

## 2023-10-22 RX ORDER — ACETAMINOPHEN 325 MG/1
975 TABLET ORAL EVERY 8 HOURS
Status: DISCONTINUED | OUTPATIENT
Start: 2023-10-22 | End: 2023-10-22

## 2023-10-22 RX ORDER — GABAPENTIN 300 MG/1
300 CAPSULE ORAL EVERY EVENING
Status: DISCONTINUED | OUTPATIENT
Start: 2023-10-22 | End: 2023-10-22

## 2023-10-22 RX ORDER — ALBUTEROL SULFATE 90 UG/1
2 AEROSOL, METERED RESPIRATORY (INHALATION) EVERY 4 HOURS PRN
Status: DISCONTINUED | OUTPATIENT
Start: 2023-10-22 | End: 2023-10-23 | Stop reason: HOSPADM

## 2023-10-22 RX ORDER — PRIMIDONE 250 MG/1
500 TABLET ORAL EVERY EVENING
COMMUNITY
Start: 2023-10-09 | End: 2024-01-03

## 2023-10-22 RX ORDER — PRIMIDONE 50 MG/1
500 TABLET ORAL EVERY EVENING
Status: DISCONTINUED | OUTPATIENT
Start: 2023-10-22 | End: 2023-10-23 | Stop reason: HOSPADM

## 2023-10-22 RX ORDER — SODIUM CHLORIDE 9 MG/ML
INJECTION, SOLUTION INTRAVENOUS CONTINUOUS
Status: DISCONTINUED | OUTPATIENT
Start: 2023-10-22 | End: 2023-10-23

## 2023-10-22 RX ORDER — KETOROLAC TROMETHAMINE 15 MG/ML
15 INJECTION, SOLUTION INTRAMUSCULAR; INTRAVENOUS ONCE
Status: COMPLETED | OUTPATIENT
Start: 2023-10-22 | End: 2023-10-22

## 2023-10-22 RX ORDER — NALOXONE HYDROCHLORIDE 0.4 MG/ML
0.4 INJECTION, SOLUTION INTRAMUSCULAR; INTRAVENOUS; SUBCUTANEOUS
Status: DISCONTINUED | OUTPATIENT
Start: 2023-10-22 | End: 2023-10-23 | Stop reason: HOSPADM

## 2023-10-22 RX ORDER — PANTOPRAZOLE SODIUM 40 MG/1
40 TABLET, DELAYED RELEASE ORAL
Status: DISCONTINUED | OUTPATIENT
Start: 2023-10-22 | End: 2023-10-23 | Stop reason: HOSPADM

## 2023-10-22 RX ORDER — SENNOSIDES 8.6 MG
1300 CAPSULE ORAL EVERY EVENING
COMMUNITY
End: 2024-01-03

## 2023-10-22 RX ADMIN — POTASSIUM CHLORIDE 10 MEQ: 7.46 INJECTION, SOLUTION INTRAVENOUS at 11:17

## 2023-10-22 RX ADMIN — PANTOPRAZOLE SODIUM 40 MG: 40 TABLET, DELAYED RELEASE ORAL at 07:38

## 2023-10-22 RX ADMIN — GABAPENTIN 100 MG: 100 CAPSULE ORAL at 07:38

## 2023-10-22 RX ADMIN — SODIUM CHLORIDE 66 ML: 9 INJECTION, SOLUTION INTRAVENOUS at 04:21

## 2023-10-22 RX ADMIN — SODIUM CHLORIDE: 9 INJECTION, SOLUTION INTRAVENOUS at 06:14

## 2023-10-22 RX ADMIN — POTASSIUM CHLORIDE 10 MEQ: 7.46 INJECTION, SOLUTION INTRAVENOUS at 09:03

## 2023-10-22 RX ADMIN — POTASSIUM CHLORIDE 10 MEQ: 7.46 INJECTION, SOLUTION INTRAVENOUS at 03:14

## 2023-10-22 RX ADMIN — ONDANSETRON 4 MG: 2 INJECTION INTRAMUSCULAR; INTRAVENOUS at 01:26

## 2023-10-22 RX ADMIN — ACETAMINOPHEN 975 MG: 325 TABLET, FILM COATED ORAL at 07:37

## 2023-10-22 RX ADMIN — SODIUM CHLORIDE, POTASSIUM CHLORIDE, SODIUM LACTATE AND CALCIUM CHLORIDE 1000 ML: 600; 310; 30; 20 INJECTION, SOLUTION INTRAVENOUS at 02:04

## 2023-10-22 RX ADMIN — POTASSIUM CHLORIDE 10 MEQ: 7.46 INJECTION, SOLUTION INTRAVENOUS at 12:46

## 2023-10-22 RX ADMIN — ENOXAPARIN SODIUM 40 MG: 100 INJECTION SUBCUTANEOUS at 07:37

## 2023-10-22 RX ADMIN — SODIUM CHLORIDE 500 ML: 9 INJECTION, SOLUTION INTRAVENOUS at 01:38

## 2023-10-22 RX ADMIN — PRIMIDONE 500 MG: 50 TABLET ORAL at 18:18

## 2023-10-22 RX ADMIN — KETOROLAC TROMETHAMINE 15 MG: 15 INJECTION, SOLUTION INTRAMUSCULAR; INTRAVENOUS at 01:45

## 2023-10-22 RX ADMIN — SODIUM CHLORIDE, POTASSIUM CHLORIDE, SODIUM LACTATE AND CALCIUM CHLORIDE 1000 ML: 600; 310; 30; 20 INJECTION, SOLUTION INTRAVENOUS at 04:06

## 2023-10-22 RX ADMIN — TAMSULOSIN HYDROCHLORIDE 0.8 MG: 0.4 CAPSULE ORAL at 18:18

## 2023-10-22 RX ADMIN — IOPAMIDOL 100 ML: 755 INJECTION, SOLUTION INTRAVENOUS at 04:21

## 2023-10-22 RX ADMIN — SODIUM CHLORIDE, SODIUM LACTATE, POTASSIUM CHLORIDE, CALCIUM CHLORIDE AND DEXTROSE MONOHYDRATE: 5; 600; 310; 30; 20 INJECTION, SOLUTION INTRAVENOUS at 01:23

## 2023-10-22 RX ADMIN — POTASSIUM CHLORIDE 10 MEQ: 7.46 INJECTION, SOLUTION INTRAVENOUS at 10:15

## 2023-10-22 ASSESSMENT — ENCOUNTER SYMPTOMS
ABDOMINAL PAIN: 1
ABDOMINAL DISTENTION: 1
FATIGUE: 0
CHEST TIGHTNESS: 0
APPETITE CHANGE: 1
SHORTNESS OF BREATH: 0
LIGHT-HEADEDNESS: 0
CHILLS: 1
CONFUSION: 0
NAUSEA: 1
HEADACHES: 0
WEAKNESS: 0
BACK PAIN: 0
VOMITING: 1
COUGH: 0
FEVER: 0

## 2023-10-22 ASSESSMENT — ACTIVITIES OF DAILY LIVING (ADL)
ADLS_ACUITY_SCORE: 25
ADLS_ACUITY_SCORE: 26
ADLS_ACUITY_SCORE: 35
ADLS_ACUITY_SCORE: 25
ADLS_ACUITY_SCORE: 35
ADLS_ACUITY_SCORE: 25

## 2023-10-22 ASSESSMENT — CHADS2 SCORE: AGE GREATER THAN OR EQUAL TO 75: YES

## 2023-10-22 NOTE — PLAN OF CARE
"Goal Outcome Evaluation: pt has slight pain to right abdomen. He states chronic body pain, chronic neck pain. He states that \"at the moment, my neck is the worst\"4/10. Pt states that he moves and adjusts his neck for relief. Pillows under knees for back/hip and leg pain. No N/V on admit to floor.                        "

## 2023-10-22 NOTE — ED NOTES
Pt has continued to demonstrate asymptomatic hypotension even after fluid boluses. Pt ambulated 3 laps around the unit and had no lightheadedness or dizziness. Upon return from activity, systolic pressure was 102 but then drifted down again to the 80's. A manual pressure was obtained and was also in the 80's systolic at rest. Pt offers no complaints or concerns at this time.

## 2023-10-22 NOTE — PROGRESS NOTES
End Of Shift Note    Situation: Pt has had several days of N/V and last BM was 3 days ago. Pt has SBO    Plan: NPO, increase movement    Subjective/Objective:    Neuro: A&Ox4    Cardiac: SBP in the 90's receiving IVF and denies any dizziness.     Resp: RA, lungs clear    GI/: Continent of B&B, Had one small loose/formed BM. BS's are active    MSK: SBA with gait belt, ambulated in room and hallways    Skin: WNL    LDAs: 1 PIV with NS running at 100ml/hr. K+ replaced and will be rechecked around 1515.

## 2023-10-22 NOTE — PROGRESS NOTES
[facility]    Medicine Progress Note - Hospitalist Service    Date of Admission:  10/22/2023    Assessment & Plan      Peña Hall is a 78 year old male with past medical history of colon cancer s/p radiation and resection 2020, appendectomy, cholecystectomy, recurrent episodes of SBO, BPH, and HLD admitted on 10/22/2023 with SBO.     # Concern for SBO vs ileus   Hx of multiple surgeries and radiation to abd and has had SBO's before. Has chronic diarrhea and has been taken metamucil and lately imodium. CT suggesting either a small bowel ileus or possible early or partial obstruction. With resolution of nausea and vomiting after presentation to the emergency department an NG tube was not placed. Patient continues to pass gas and has had a small stool today.  - Continued NPO  - Refrained from placing NG tube at [facility], given  resolution of vomiting     # Chronic diarrhea   Multiple colonoscopies and last one was 8/2023 which was unremarkable. CUrrently with illeus vs SBO    # Hx hypertension   # Hypotension   Lower blood pressures since admission with systolic's in th 90's. Denies ant lightlessness or dizziness. Lactate initially 2.1 ans resolved.   -Holding lisinipril, hydrochlorothiazide and propranolol.    # CAITLYN vs CKD  1.37 on presentation, unclear baseline, suspect prerenal.    # Hyponatremia   Suspect hypovolumic  - BMP with am labs     # Hypokalemia   - Replacing prn    # Hyperlipidemia  -Holding statin.    # Chronic back pain   Continue neurontin for now.  Appears to have been considering spinal cord stimulator placment     # Essential Tremor  - Continued PTA primidone     # Acute normocytic anemia  Hgb drop from 15-12. Suspect dilution. No signs of bleeding.     # BPH  - Continued tamsulosin        Diet: Combination Diet Clear Liquid    DVT Prophylaxis: Enoxaparin (Lovenox) SQ  Shaffer Catheter: Not present  Lines: None     Cardiac Monitoring: None  Code Status: Full Code   "    Clinically Significant Risk Factors Present on Admission        # Hypokalemia: Lowest K = 3.1 mmol/L in last 2 days, will replace as needed   # Hypocalcemia: Lowest Ca = 7.7 mg/dL in last 2 days, will monitor and replace as appropriate         # Hypertension: Noted on problem list      # Obesity: Estimated body mass index is 31.42 kg/m  as calculated from the following:    Height as of this encounter: 1.676 m (5' 6\").    Weight as of this encounter: 88.3 kg (194 lb 10.7 oz).              Disposition Plan      Expected Discharge Date: 10/23/2023                Pending return of bowel function     Ba Garza MD  Hospitalist Service  [facility]  Securely message with [employer] (more info)  Text page via [employer] Paging/Directory   ______________________________________________________________________    Interval History   Admitted overnight from the emergency department.  No acute events since admission.  Patient states his abdominal pain continues to improve and that he has had no nausea or vomiting since he came to the emergency department.  Denies any dizziness, palpitations, or chest pain    Physical Exam   Vital Signs: Temp: 97.9  F (36.6  C) Temp src: Oral BP: 97/54 Pulse: 81   Resp: 20 SpO2: 92 % O2 Device: None (Room air)    Weight: 194 lbs 10.66 oz    General Appearance: Awake and alert, laying in bed, no acute distress  Respiratory: Breathing easily on room air, lungs clear to auscultation bilaterally  Cardiovascular: Regular rate and rhythm, extremities warm and well-perfused  GI: Abdomen soft, mildly distended, mildly tender in lower quadrants, bowel sounds to auscultation in all quadrants  Skin: No rashes or lesions  Other: Appropriate mood and affect, oriented to person, place, and location, no focal deficits appreciated    Medical Decision Making       45 MINUTES SPENT BY ME on the date of service doing chart review, history, exam, documentation & further activities per " the note.      Data     I have personally reviewed the following data over the past 24 hrs:    6.9  \   12.4 (L)   / 160     130 (L) 93 (L) 33.0 (H) /  140 (H)   3.8 28 1.24 (H) \     ALT: 19 AST: 27 AP: 67 TBILI: 0.7   ALB: 4.1 TOT PROTEIN: 6.6 LIPASE: N/A     Procal: N/A CRP: N/A Lactic Acid: 1.9         Imaging results reviewed over the past 24 hrs:   Recent Results (from the past 24 hour(s))   Abdomen, flat/upright (2 views)    Narrative    EXAM: XR ABDOMEN 2 VIEWS  LOCATION: [facility]  DATE: 10/22/2023    INDICATION: right sided abdominal pain, nausea and vomiting, probable bowel obstruction.  COMPARISON: None.      Impression    IMPRESSION: Moderately distended small bowel loops are seen in the left and right abdomen with internal air-fluid levels and small amount of gas mottled stool in nondistended colon, highly suspicious for small bowel obstruction. No intraperitoneal free   air. No appreciable renal calcifications.    Bilateral total hip arthroplasties are intact. Cholecystectomy clips seen in the right upper quadrant. Spinal degenerative changes with moderate L2/L3 disc space narrowing noted.   CT Abdomen Pelvis w Contrast    Narrative    EXAM: CT ABDOMEN AND PELVIS WITH CONTRAST  LOCATION: [facility]  DATE/TIME: 10/22/2023, 4:32 AM CDT    INDICATION: Abdominal pain. Small bowel obstruction. Low blood pressure.  COMPARISON: None.    TECHNIQUE: CT scan of the abdomen and pelvis was performed following injection of IV contrast. Multiplanar reformats were obtained. Dose reduction techniques were used.  CONTRAST: 100 mL Isovue 370.    FINDINGS:    LOWER CHEST: Coronary artery calcification.    HEPATOBILIARY: The gallbladder is not visualized.    SPLEEN: A few tiny calcified granulomas in the spleen.    PANCREAS: Unremarkable.    ADRENAL GLANDS: Unremarkable.    KIDNEYS/BLADDER: No suspicious renal lesions. The urinary bladder is obscured by streak  artifact from bilateral hip arthroplasties.    BOWEL: Small hiatal hernia. A long segment of mildly distended fluid-filled small bowel is present in the mid abdomen and pelvis and the more distal small bowel is relatively decompressed. A few areas of mild wall thickening scattered within the   distended small bowel. The appendix is not visualized. No pneumatosis or free intraperitoneal gas.    LYMPH NODES: Unremarkable.    PELVIC ORGANS: No acute findings.    MUSCULOSKELETAL: Partial visualization of bilateral hip arthroplasties.    OTHER: Atherosclerotic calcification in the abdominal aorta. Small supraumbilical hernia containing fat.      Impression    IMPRESSION:   1.  A long segment of mildly distended fluid-filled small bowel is present in the abdomen and pelvis with the more distal small bowel relatively decompressed. These findings are suggestive of either a small bowel ileus or possibly an early or partial   distal small bowel obstruction.   2.  A few areas of wall thickening of the distended small bowel are nonspecific, but could relate to infectious or inflammatory enteritis.  3.  No other acute abnormality identified in the abdomen or pelvis.

## 2023-10-22 NOTE — ED PROVIDER NOTES
History     Chief Complaint   Patient presents with    Abdominal Pain     HPI  Peña Hall is a 78 year old male with history of previous colon cancer, appendectomy and cholecystectomy as well as multiple recurrent bowel obstructions presented for evaluation of several days of abdominal pain with nausea and vomiting.  Last bowel movement was 3 days ago.  Patient has been able to successfully manage some partial obstructions at home in the past and tried to do so over the past few days but tonight continued to feel nauseated and vomited again so came in for evaluation.  Denies fever but was feeling chilled earlier tonight.  Denies chest pain or difficulty breathing.  Reports abdominal bloating and right-sided abdominal pain.  Pain has subsided somewhat after his last emesis about an hour before ED arrival.  Currently having minimal nausea.  Has never required surgical intervention for his bowel obstructions.    Allergies:  No Known Allergies    Problem List:    Patient Active Problem List    Diagnosis Date Noted    Hypokalemia 10/22/2023     Priority: Medium    SBO (small bowel obstruction) (H) 10/22/2023     Priority: Medium    Renal insufficiency 10/22/2023     Priority: Medium        Past Medical History:    No past medical history on file.    Past Surgical History:    Past Surgical History:   Procedure Laterality Date    COLONOSCOPY N/A 8/18/2023    Procedure: COLONOSCOPY, WITH POLYPECTOMY AND BIOPSY;  Surgeon: Marli Lyons MD;  Location: [facility]    COLONOSCOPY N/A 8/18/2023    Procedure: COLONOSCOPY, FLEXIBLE, WITH LESION REMOVAL USING SNARE;  Surgeon: Marli Lyons MD;  Location: [facility]       Family History:    No family history on file.    Social History:  Marital Status:   [2]        Medications:    albuterol (PROAIR HFA/PROVENTIL HFA/VENTOLIN HFA) 108 (90 Base) MCG/ACT inhaler  atorvastatin (LIPITOR) 20 MG tablet  cholecalciferol (VITAMIN D3) 125 mcg (5000 units) capsule  gabapentin (NEURONTIN)  "100 MG capsule  gabapentin (NEURONTIN) 300 MG capsule  hydrochlorothiazide (HYDRODIURIL) 25 MG tablet  lisinopril (ZESTRIL) 40 MG tablet  meloxicam (MOBIC) 15 MG tablet  omeprazole (PRILOSEC) 20 MG DR capsule  propranolol (INDERAL) 10 MG tablet  tamsulosin (FLOMAX) 0.4 MG capsule  bisacodyl (DULCOLAX) 5 MG EC tablet  polyethylene glycol (GOLYTELY) 236 g suspension          Review of Systems   Constitutional:  Positive for appetite change and chills. Negative for fatigue and fever.   HENT:  Negative for congestion.    Respiratory:  Negative for cough, chest tightness and shortness of breath.    Cardiovascular:  Negative for chest pain.   Gastrointestinal:  Positive for abdominal distention, abdominal pain, nausea and vomiting.   Genitourinary:  Negative for decreased urine volume.   Musculoskeletal:  Negative for back pain.   Neurological:  Negative for weakness, light-headedness and headaches.   Psychiatric/Behavioral:  Negative for confusion.    All other systems reviewed and are negative.      Physical Exam   BP: 92/51  Pulse: 82  Temp: 98.3  F (36.8  C)  Resp: 18  Height: 168.9 cm (5' 6.5\")  Weight: 89.8 kg (198 lb)  SpO2: 93 %      Physical Exam  Vitals and nursing note reviewed.   Constitutional:       Appearance: Normal appearance. He is not ill-appearing or diaphoretic.   HENT:      Head: Atraumatic.      Nose: Nose normal.      Mouth/Throat:      Mouth: Mucous membranes are moist.   Eyes:      Conjunctiva/sclera: Conjunctivae normal.   Cardiovascular:      Rate and Rhythm: Normal rate.      Pulses: Normal pulses.   Pulmonary:      Effort: Pulmonary effort is normal.   Abdominal:      Palpations: Abdomen is soft.      Tenderness: There is abdominal tenderness. There is no guarding.      Comments: Protuberant abdomen, soft, tender in the right abdomen but not peritoneal.  High-pitched bowel sounds suggestive of obstruction present   Musculoskeletal:         General: Normal range of motion.   Skin:     General: " Skin is warm.      Capillary Refill: Capillary refill takes less than 2 seconds.   Neurological:      Mental Status: He is alert and oriented to person, place, and time.   Psychiatric:         Mood and Affect: Mood normal.         ED Course                 Procedures           The Lactic acid level is elevated due to sbo, at this time there is no sign of severe sepsis or septic shock.         Results for orders placed or performed during the hospital encounter of 10/22/23 (from the past 24 hour(s))   CBC with platelets differential    Narrative    The following orders were created for panel order CBC with platelets differential.  Procedure                               Abnormality         Status                     ---------                               -----------         ------                     CBC with platelets and d...[[id number]]  Abnormal            Final result                 Please view results for these tests on the individual orders.   Comprehensive metabolic panel   Result Value Ref Range    Sodium 132 (L) 135 - 145 mmol/L    Potassium 3.1 (L) 3.4 - 5.3 mmol/L    Carbon Dioxide (CO2) 26 22 - 29 mmol/L    Anion Gap 16 (H) 7 - 15 mmol/L    Urea Nitrogen 32.1 (H) 8.0 - 23.0 mg/dL    Creatinine 1.37 (H) 0.67 - 1.17 mg/dL    GFR Estimate 53 (L) >60 mL/min/1.73m2    Calcium 8.5 (L) 8.8 - 10.2 mg/dL    Chloride 90 (L) 98 - 107 mmol/L    Glucose 122 (H) 70 - 99 mg/dL    Alkaline Phosphatase 67 40 - 129 U/L    AST 27 0 - 45 U/L    ALT 19 0 - 70 U/L    Protein Total 6.6 6.4 - 8.3 g/dL    Albumin 4.1 3.5 - 5.2 g/dL    Bilirubin Total 0.7 <=1.2 mg/dL   Lactic acid whole blood   Result Value Ref Range    Lactic Acid 2.1 (H) 0.7 - 2.0 mmol/L   CBC with platelets and differential   Result Value Ref Range    WBC Count 6.7 4.0 - 11.0 10e3/uL    RBC Count 4.96 4.40 - 5.90 10e6/uL    Hemoglobin 15.3 13.3 - 17.7 g/dL    Hematocrit 44.7 40.0 - 53.0 %    MCV 90 78 - 100 fL    MCH 30.8 26.5 - 33.0 pg    MCHC 34.2 31.5 -  36.5 g/dL    RDW 13.2 10.0 - 15.0 %    Platelet Count 206 150 - 450 10e3/uL    % Neutrophils 91 %    % Lymphocytes 6 %    % Monocytes 2 %    Mids % (Monos, Eos, Basos)      % Eosinophils 1 %    % Basophils 0 %    % Immature Granulocytes 0 %    NRBCs per 100 WBC 0 <1 /100    Absolute Neutrophils 6.1 1.6 - 8.3 10e3/uL    Absolute Lymphocytes 0.4 (L) 0.8 - 5.3 10e3/uL    Absolute Monocytes 0.1 0.0 - 1.3 10e3/uL    Mids Abs (Monos, Eos, Basos)      Absolute Eosinophils 0.1 0.0 - 0.7 10e3/uL    Absolute Basophils 0.0 0.0 - 0.2 10e3/uL    Absolute Immature Granulocytes 0.0 <=0.4 10e3/uL    Absolute NRBCs 0.0 10e3/uL   Abdomen, flat/upright (2 views)    Narrative    EXAM: XR ABDOMEN 2 VIEWS  LOCATION: [facility]  DATE: 10/22/2023    INDICATION: right sided abdominal pain, nausea and vomiting, probable bowel obstruction.  COMPARISON: None.      Impression    IMPRESSION: Moderately distended small bowel loops are seen in the left and right abdomen with internal air-fluid levels and small amount of gas mottled stool in nondistended colon, highly suspicious for small bowel obstruction. No intraperitoneal free   air. No appreciable renal calcifications.    Bilateral total hip arthroplasties are intact. Cholecystectomy clips seen in the right upper quadrant. Spinal degenerative changes with moderate L2/L3 disc space narrowing noted.   Lactic acid whole blood   Result Value Ref Range    Lactic Acid 1.9 0.7 - 2.0 mmol/L   CT Abdomen Pelvis w Contrast    Narrative    EXAM: CT ABDOMEN AND PELVIS WITH CONTRAST  LOCATION: [facility]  DATE/TIME: 10/22/2023, 4:32 AM CDT    INDICATION: Abdominal pain. Small bowel obstruction. Low blood pressure.  COMPARISON: None.    TECHNIQUE: CT scan of the abdomen and pelvis was performed following injection of IV contrast. Multiplanar reformats were obtained. Dose reduction techniques were used.  CONTRAST: 100 mL Isovue  370.    FINDINGS:    LOWER CHEST: Coronary artery calcification.    HEPATOBILIARY: The gallbladder is not visualized.    SPLEEN: A few tiny calcified granulomas in the spleen.    PANCREAS: Unremarkable.    ADRENAL GLANDS: Unremarkable.    KIDNEYS/BLADDER: No suspicious renal lesions. The urinary bladder is obscured by streak artifact from bilateral hip arthroplasties.    BOWEL: Small hiatal hernia. A long segment of mildly distended fluid-filled small bowel is present in the mid abdomen and pelvis and the more distal small bowel is relatively decompressed. A few areas of mild wall thickening scattered within the   distended small bowel. The appendix is not visualized. No pneumatosis or free intraperitoneal gas.    LYMPH NODES: Unremarkable.    PELVIC ORGANS: No acute findings.    MUSCULOSKELETAL: Partial visualization of bilateral hip arthroplasties.    OTHER: Atherosclerotic calcification in the abdominal aorta. Small supraumbilical hernia containing fat.      Impression    IMPRESSION:   1.  A long segment of mildly distended fluid-filled small bowel is present in the abdomen and pelvis with the more distal small bowel relatively decompressed. These findings are suggestive of either a small bowel ileus or possibly an early or partial   distal small bowel obstruction.   2.  A few areas of wall thickening of the distended small bowel are nonspecific, but could relate to infectious or inflammatory enteritis.  3.  No other acute abnormality identified in the abdomen or pelvis.           Medications   dextrose 5% in lactated ringers infusion ( Intravenous Rate/Dose Verify 10/22/23 0349)   ondansetron (ZOFRAN) injection 4 mg (4 mg Intravenous $Given 10/22/23 0126)   sodium chloride 0.9% BOLUS 500 mL (0 mLs Intravenous Stopped 10/22/23 0250)   ketorolac (TORADOL) injection 15 mg (15 mg Intravenous $Given 10/22/23 0145)   lactated ringers BOLUS 1,000 mL (0 mLs Intravenous Stopped 10/22/23 0310)   potassium chloride 10 mEq  in 100 mL sterile water infusion (0 mEq Intravenous Stopped 10/22/23 0436)   lactated ringers BOLUS 1,000 mL (1,000 mLs Intravenous $New Bag 10/22/23 0406)   iopamidol (ISOVUE-370) solution 100 mL (100 mLs Intravenous $Given 10/22/23 0421)   sodium chloride 0.9 % bag 500mL for CT scan flush use (66 mLs Intravenous $Given 10/22/23 0421)     1:58 AM: Lactic acid is elevated however he has no fever and no white count.  Lactate elevation likely due to his bowel obstruction.  We will give a bolus of fluids as his blood pressure is on the low side and he has not been eating or drinking well.  Labs show some mild hyponatremia and hypokalemia.    2:37 AM: Re-assessed. BP remains low.  Patient reports he had this 1 previous time with no obstruction requiring hospitalization and the blood pressure improved as his obstruction spontaneously improved.  Patient resting comfortable.  Has no complaints.  Still with some pain but no better or worse than arrival.  Nausea well controlled.  Abdomen remains soft with minimal to no tenderness in the left and some mild to moderate tenderness in the right.  Heart rate stable.  We will continue with IV fluids repeat lactic acid.    4:10 AM: Repeat lactic acid improved.  Patient was able to ambulate without symptoms in the ED and blood pressure improved to the low 100s after ambulation.  When resting again, blood pressure dipping lower.  Patient reassessed at bedside.  Patient sleeping comfortably.  Easily awoken.  Reports his pain is unchanged.  Nausea still well controlled.  Abdomen remains soft with only moderate right-sided tenderness.  I do not have a good explanation for his low blood pressure.  Low fluid status certainly is likely as he has not urinated.  Receiving a second bolus of fluids now.  We will obtain a CT to be sure there is no other significant pathology contributing to his low blood pressure.    4:48 AM: I reviewed the CT.  Bowel is edematous with some air-fluid levels.   Bladder appears relatively decompressed although IVC is dilated.  There is a renal cyst on the left.  No evidence of free fluid or free air.  Formal CT read pending by radiology.    5:12 AM: Discussed with Mikayla Munoz. Accepts for floor admission, obs.       Assessments & Plan (with Medical Decision Making)  78-year-old with history of small bowel obstructions in the past with a remote history of colon cancer status postsurgical resection presenting for evaluation abdominal pain, nausea, vomiting and abdominal bloating.  Screening x-ray shows evidence of a small bowel obstruction.  Patient became hypertensive while here although asymptomatic from it.  Patient was able to ambulate and his blood pressure did improve but when he laid back down his blood pressure again decreased.  He remained with normal mentation.  Lactic acid was mildly elevated soakings given IV fluids as he did appear clinically dehydrated.  No evidence of infection including no fever or white count and no indication for antibiotics.  Given his persistent hypotension despite initial fluid resuscitation and improvement in lactic acid, I did secondarily obtain a CT to be sure he is not having other complications.  CT confirms small bowel obstruction with some bowel edema but no other acute process.  Discussed with hospitalist who agrees to admit him for observation and IV fluids.     I have reviewed the nursing notes.    I have reviewed the findings, diagnosis, plan and need for follow up with the patient.          New Prescriptions    No medications on file       Final diagnoses:   SBO (small bowel obstruction) (H)   Renal insufficiency   Hypokalemia       10/22/2023   [facility] EMERGENCY DEPT       Moss, Papi Murphy MD  10/22/23 0588

## 2023-10-22 NOTE — MEDICATION SCRIBE - ADMISSION MEDICATION HISTORY
Medication Scribe Admission Medication History    Admission medication history is complete. The information provided in this note is only as accurate as the sources available at the time of the update.    Information Source(s): Patient via in-person    Pertinent Information: None    Changes made to PTA medication list:  Added: Primidone 500 mg, Propranolol 60 mg, Tylenol 650 mg  Deleted: Gabapentin 300 mg, Propranolol 10 mg, Omeprazole 20 mg  Changed: None    Medication Affordability:  Not including over the counter (OTC) medications, was there a time in the past 3 months when you did not take your medications as prescribed because of cost?: No    Allergies reviewed with patient and updates made in EHR: yes    Medication History Completed By: Keiry Bain 10/22/2023 1:14 PM    PTA Med List   Medication Sig Last Dose    acetaminophen (TYLENOL) 650 MG CR tablet Take 1,300 mg by mouth every evening 10/21/2023 at pm    albuterol (PROAIR HFA/PROVENTIL HFA/VENTOLIN HFA) 108 (90 Base) MCG/ACT inhaler Inhale 2 puff using inhaler every four hours as needed for wheezing. pretreat before activity Past Week    atorvastatin (LIPITOR) 20 MG tablet Take 1 tablet by mouth every evening 10/21/2023 at 2000    bisacodyl (DULCOLAX) 5 MG EC tablet Take 2 tablets at 3 pm the day before your procedure. If your procedure is before 11 am, take 2 additional tablets at 11 pm. If your procedure is after 11 am, take 2 additional tablets at 6 am. For additional instructions refer to your colonoscopy prep instructions. Unknown at future use    cholecalciferol (VITAMIN D3) 125 mcg (5000 units) capsule Take 125 mcg by mouth daily Past Week at 0800    gabapentin (NEURONTIN) 100 MG capsule Take 100 mg by mouth every morning 10/21/2023 at am    hydrochlorothiazide (HYDRODIURIL) 25 MG tablet Take 1 tablet by mouth daily 10/21/2023 at 0800    lisinopril (ZESTRIL) 40 MG tablet Take 1 tablet by mouth daily 10/21/2023 at 0800    meloxicam (MOBIC) 15 MG  tablet Take 1 tablet by mouth daily 10/21/2023 at 0800    polyethylene glycol (GOLYTELY) 236 g suspension The night before the exam at 6 pm drink an 8-ounce glass every 15 minutes until the jug is half empty. If you arrive before 11 AM: Drink the other half of the Golytely jug at 11 PM night before procedure. If you arrive after 11 AM: Drink the other half of the Golytely jug at 6 AM day of procedure. For additional instructions refer to your colonoscopy prep instructions. Unknown at future    primidone (MYSOLINE) 250 MG tablet Take 500 mg by mouth every evening 10/21/2023 at pm    propranolol ER (INDERAL LA) 60 MG 24 hr capsule Take 60 mg by mouth daily 10/21/2023 at am    tamsulosin (FLOMAX) 0.4 MG capsule Take 0.8 mg by mouth daily 10/21/2023 at 0800

## 2023-10-22 NOTE — ED TRIAGE NOTES
Pt presents for evaluation of abdominal pain and no bowel movement since Friday. Significant history of bowel obstructions      Triage Assessment (Adult)       Row Name 10/22/23 0118          Triage Assessment    Airway WDL WDL        Respiratory WDL    Respiratory WDL WDL        Skin Circulation/Temperature WDL    Skin Circulation/Temperature WDL WDL        Cardiac WDL    Cardiac WDL WDL        Peripheral/Neurovascular WDL    Peripheral Neurovascular WDL WDL        Cognitive/Neuro/Behavioral WDL    Cognitive/Neuro/Behavioral WDL WDL

## 2023-10-22 NOTE — H&P
"[facility]    History and Physical - Hospitalist Service       Date of Admission:  10/22/2023    Assessment & Plan      Peña Hall is a 78 year old male admitted on 10/22/2023. He came in with abd pain and likely SBO.    RECURRENT SBO:  Hx of multiple surgeries and radiation to abd and has had SBO's before.  Has chronic diarrhea and has been taken metamucil and lately immodium and wonder if this has contributed to current problem.  Will hold all non-essential medicines and offer only clears for now.  NS  Try non-narcotic pain medicines and ambulation.    CHRONIC DIARRHEA:  Multiple colonoscopies and last one was 8/2023 which was unremarkable.  Had radiation for colon cancer, before resection,and perhaps this has affected the bowels.  Hold all meds at this time.    HYPOTENSION:  Hold his antihypertensives for now.    HTN:  Hold lisinipril, hydrochlorothiazide and propranolol.    HYPERLIPIDEMIA:  Hold statin.    CHRONIC BACK PAIN:  Continue neurontin for now.        Diet: Combination Diet Clear Liquid    DVT Prophylaxis: Enoxaparin (Lovenox) SQ  Shaffer Catheter: Not present  Lines: None     Cardiac Monitoring: None  Code Status: Full Code      Clinically Significant Risk Factors Present on Admission        # Hypokalemia: Lowest K = 3.1 mmol/L in last 2 days, will replace as needed           # Hypertension: Noted on problem list      # Obesity: Estimated body mass index is 31.48 kg/m  as calculated from the following:    Height as of this encounter: 1.689 m (5' 6.5\").    Weight as of this encounter: 89.8 kg (198 lb).              Disposition Plan  home when stable     Expected Discharge Date: 10/23/2023                  Mikayla Light MD  Hospitalist Service  [facility]  Securely message with [employer] (more info)  Text page via [facility] Paging/Directory     ______________________________________________________________________    Chief Complaint   Abd pain, " N/V    History is obtained from the patient and daughter.    History of Present Illness   Peña Hall is a 78 year old male who has hx of SBOs and comes in with increasing abd pain, N/V  and no BM for 2 days.  No f/c/chest pain/cough/dysuria.  No trauma and voiding well.  Decreased po intake due to abd pain.      Past Medical History    Past Medical History:   Diagnosis Date    BPH (benign prostatic hyperplasia)     Chronic diarrhea 10/22/2023    Chronic midline low back pain without sciatica 10/22/2023    Colon cancer (H) 2000    HTN (hypertension)     Hyperlipidemia LDL goal <130        Past Surgical History   Past Surgical History:   Procedure Laterality Date    APPENDECTOMY  1959    BOWEL RESECTION  2000    CHOLECYSTECTOMY  2018    COLONOSCOPY N/A 08/18/2023    Procedure: COLONOSCOPY, WITH POLYPECTOMY AND BIOPSY;  Surgeon: Marli Lyons MD;  Location: [facility]    COLONOSCOPY N/A 08/18/2023    Procedure: COLONOSCOPY, FLEXIBLE, WITH LESION REMOVAL USING SNARE;  Surgeon: Marli Lyons MD;  Location: [facility]       Prior to Admission Medications   Prior to Admission Medications   Prescriptions Last Dose Informant Patient Reported? Taking?   albuterol (PROAIR HFA/PROVENTIL HFA/VENTOLIN HFA) 108 (90 Base) MCG/ACT inhaler Past Week  Yes Yes   Sig: Inhale 2 puff using inhaler every four hours as needed for wheezing. pretreat before activity   atorvastatin (LIPITOR) 20 MG tablet Past Week at 2000  Yes Yes   Sig: Take 1 tablet by mouth every evening   bisacodyl (DULCOLAX) 5 MG EC tablet   No No   Sig: Take 2 tablets at 3 pm the day before your procedure. If your procedure is before 11 am, take 2 additional tablets at 11 pm. If your procedure is after 11 am, take 2 additional tablets at 6 am. For additional instructions refer to your colonoscopy prep instructions.   cholecalciferol (VITAMIN D3) 125 mcg (5000 units) capsule Past Week at 0800  Yes Yes   Sig: Take 125 mcg by mouth daily   gabapentin (NEURONTIN) 100 MG capsule  Past Week at 0800  Yes Yes   Sig: Take 100 mg by mouth   gabapentin (NEURONTIN) 300 MG capsule Past Week at 2000  Yes Yes   Sig: Take 1 capsule by mouth every evening   hydrochlorothiazide (HYDRODIURIL) 25 MG tablet Past Week at 0800  Yes Yes   Sig: Take 1 tablet by mouth daily   lisinopril (ZESTRIL) 40 MG tablet Past Week at 0800  Yes Yes   Sig: Take 1 tablet by mouth daily   meloxicam (MOBIC) 15 MG tablet Past Week at 0800  Yes Yes   Sig: Take 1 tablet by mouth daily   omeprazole (PRILOSEC) 20 MG DR capsule Past Week at 0730  Yes Yes   Sig: Take 20 mg by mouth   polyethylene glycol (GOLYTELY) 236 g suspension   No No   Sig: The night before the exam at 6 pm drink an 8-ounce glass every 15 minutes until the jug is half empty. If you arrive before 11 AM: Drink the other half of the Golytely jug at 11 PM night before procedure. If you arrive after 11 AM: Drink the other half of the Golytely jug at 6 AM day of procedure. For additional instructions refer to your colonoscopy prep instructions.   propranolol (INDERAL) 10 MG tablet Past Week  Yes Yes   Sig: Take 10 mg by mouth   tamsulosin (FLOMAX) 0.4 MG capsule Past Week at 0800  Yes Yes   Sig: Take 0.8 mg by mouth      Facility-Administered Medications: None        Review of Systems    The 10 point Review of Systems is negative other than noted in the HPI or here.     Social History   I have reviewed this patient's social history and updated it with pertinent information if needed.         Family History           Allergies   No Known Allergies     Physical Exam   Vital Signs: Temp: 98.3  F (36.8  C) Temp src: Oral BP: (!) 87/49 Pulse: 78   Resp: 18 SpO2: 96 % O2 Device: None (Room air)    Weight: 198 lbs 0 oz    General Appearance: Wd obese man in nad, A&O  Respiratory: No dyspnea and speaking in full sentences  Cardiovascular: RRR on monitor  GI: Abd distended and pain in rlq mainly  Skin: no rashes/lesions on exposed skin     Medical Decision Making              Data     I have personally reviewed the following data over the past 24 hrs:    6.7  \   15.3   / 206     132 (L) 90 (L) 32.1 (H) /  122 (H)   3.1 (L) 26 1.37 (H) \     ALT: 19 AST: 27 AP: 67 TBILI: 0.7   ALB: 4.1 TOT PROTEIN: 6.6 LIPASE: N/A     Procal: N/A CRP: N/A Lactic Acid: 1.9         Imaging results reviewed over the past 24 hrs:   Recent Results (from the past 24 hour(s))   Abdomen, flat/upright (2 views)    Narrative    EXAM: XR ABDOMEN 2 VIEWS  LOCATION: [facility]  DATE: 10/22/2023    INDICATION: right sided abdominal pain, nausea and vomiting, probable bowel obstruction.  COMPARISON: None.      Impression    IMPRESSION: Moderately distended small bowel loops are seen in the left and right abdomen with internal air-fluid levels and small amount of gas mottled stool in nondistended colon, highly suspicious for small bowel obstruction. No intraperitoneal free   air. No appreciable renal calcifications.    Bilateral total hip arthroplasties are intact. Cholecystectomy clips seen in the right upper quadrant. Spinal degenerative changes with moderate L2/L3 disc space narrowing noted.   CT Abdomen Pelvis w Contrast    Narrative    EXAM: CT ABDOMEN AND PELVIS WITH CONTRAST  LOCATION: [facility]  DATE/TIME: 10/22/2023, 4:32 AM CDT    INDICATION: Abdominal pain. Small bowel obstruction. Low blood pressure.  COMPARISON: None.    TECHNIQUE: CT scan of the abdomen and pelvis was performed following injection of IV contrast. Multiplanar reformats were obtained. Dose reduction techniques were used.  CONTRAST: 100 mL Isovue 370.    FINDINGS:    LOWER CHEST: Coronary artery calcification.    HEPATOBILIARY: The gallbladder is not visualized.    SPLEEN: A few tiny calcified granulomas in the spleen.    PANCREAS: Unremarkable.    ADRENAL GLANDS: Unremarkable.    KIDNEYS/BLADDER: No suspicious renal lesions. The urinary bladder is obscured by streak artifact from  bilateral hip arthroplasties.    BOWEL: Small hiatal hernia. A long segment of mildly distended fluid-filled small bowel is present in the mid abdomen and pelvis and the more distal small bowel is relatively decompressed. A few areas of mild wall thickening scattered within the   distended small bowel. The appendix is not visualized. No pneumatosis or free intraperitoneal gas.    LYMPH NODES: Unremarkable.    PELVIC ORGANS: No acute findings.    MUSCULOSKELETAL: Partial visualization of bilateral hip arthroplasties.    OTHER: Atherosclerotic calcification in the abdominal aorta. Small supraumbilical hernia containing fat.      Impression    IMPRESSION:   1.  A long segment of mildly distended fluid-filled small bowel is present in the abdomen and pelvis with the more distal small bowel relatively decompressed. These findings are suggestive of either a small bowel ileus or possibly an early or partial   distal small bowel obstruction.   2.  A few areas of wall thickening of the distended small bowel are nonspecific, but could relate to infectious or inflammatory enteritis.  3.  No other acute abnormality identified in the abdomen or pelvis.         As the provider for the telehealth service, I attest that I introduced myself to the patient and provided my credentials. Based on review of the patient s chart and/or a discussion with members of the patient s treatment team, I determined that telemedicine via a real-time, two-way, interactive audio and video platform is an appropriate means of providing this service. The patient and I mutually agreed that this visit is appropriate for telemedicine as well.

## 2023-10-22 NOTE — ED NOTES
"[facility]   Admission Handoff    The patient is Peña Hall, 78 year old who arrived in the ED by AMBULANCE from home with a complaint of Abdominal Pain  . The patient's current symptoms are a recurrence of a past episode and during this time the symptoms have decreased. In the ED, patient was diagnosed with   Final diagnoses:   None         Needed?: No    Allergies:  No Known Allergies    Past Medical Hx: No past medical history on file.    Initial vitals were: BP: 92/51  Pulse: 82  Temp: 98.3  F (36.8  C)  Resp: 18  Height: 168.9 cm (5' 6.5\")  Weight: 89.8 kg (198 lb)  SpO2: 93 %   Recent vital Signs: BP 92/51   Pulse 82   Temp 98.3  F (36.8  C) (Oral)   Resp 18   Ht 1.689 m (5' 6.5\")   Wt 89.8 kg (198 lb)   SpO2 93%   BMI 31.48 kg/m      Elimination Status: Continent: Yes     Activity Level: SBA    Fall Status: Reason for falls risk:  Mobility  patient and family education    Baseline Mental status: WDL  Current Mental Status changes: at basesline    Infection present or suspected this encounter: no  Sepsis suspected: No    Isolation type: NA    Bariatric equipment needed?: No    In the ED these meds were given:   Medications   dextrose 5% in lactated ringers infusion ( Intravenous $New Bag 10/22/23 0123)   sodium chloride 0.9% BOLUS 500 mL (500 mLs Intravenous $New Bag 10/22/23 0138)   lactated ringers BOLUS 1,000 mL (1,000 mLs Intravenous $New Bag 10/22/23 0204)   ondansetron (ZOFRAN) injection 4 mg (4 mg Intravenous $Given 10/22/23 0126)   ketorolac (TORADOL) injection 15 mg (15 mg Intravenous $Given 10/22/23 0145)       Drips running?  No    Home pump  No    Current LDAs: Peripheral IV: Site RAC; Gauge 20  D5W/LR     Results:   Labs/Imaging  Ordered and Resulted from Time of ED Arrival Up to the Time of Departure from the ED  Results for orders placed or performed during the hospital encounter of 10/22/23 (from the past 24 hour(s))   CBC with platelets " differential    Narrative    The following orders were created for panel order CBC with platelets differential.  Procedure                               Abnormality         Status                     ---------                               -----------         ------                     CBC with platelets and d...[[id number]]  Abnormal            Final result                 Please view results for these tests on the individual orders.   Comprehensive metabolic panel   Result Value Ref Range    Sodium 132 (L) 135 - 145 mmol/L    Potassium 3.1 (L) 3.4 - 5.3 mmol/L    Carbon Dioxide (CO2) 26 22 - 29 mmol/L    Anion Gap 16 (H) 7 - 15 mmol/L    Urea Nitrogen 32.1 (H) 8.0 - 23.0 mg/dL    Creatinine 1.37 (H) 0.67 - 1.17 mg/dL    GFR Estimate 53 (L) >60 mL/min/1.73m2    Calcium 8.5 (L) 8.8 - 10.2 mg/dL    Chloride 90 (L) 98 - 107 mmol/L    Glucose 122 (H) 70 - 99 mg/dL    Alkaline Phosphatase 67 40 - 129 U/L    AST 27 0 - 45 U/L    ALT 19 0 - 70 U/L    Protein Total 6.6 6.4 - 8.3 g/dL    Albumin 4.1 3.5 - 5.2 g/dL    Bilirubin Total 0.7 <=1.2 mg/dL   Lactic acid whole blood   Result Value Ref Range    Lactic Acid 2.1 (H) 0.7 - 2.0 mmol/L   CBC with platelets and differential   Result Value Ref Range    WBC Count 6.7 4.0 - 11.0 10e3/uL    RBC Count 4.96 4.40 - 5.90 10e6/uL    Hemoglobin 15.3 13.3 - 17.7 g/dL    Hematocrit 44.7 40.0 - 53.0 %    MCV 90 78 - 100 fL    MCH 30.8 26.5 - 33.0 pg    MCHC 34.2 31.5 - 36.5 g/dL    RDW 13.2 10.0 - 15.0 %    Platelet Count 206 150 - 450 10e3/uL    % Neutrophils 91 %    % Lymphocytes 6 %    % Monocytes 2 %    Mids % (Monos, Eos, Basos)      % Eosinophils 1 %    % Basophils 0 %    % Immature Granulocytes 0 %    NRBCs per 100 WBC 0 <1 /100    Absolute Neutrophils 6.1 1.6 - 8.3 10e3/uL    Absolute Lymphocytes 0.4 (L) 0.8 - 5.3 10e3/uL    Absolute Monocytes 0.1 0.0 - 1.3 10e3/uL    Mids Abs (Monos, Eos, Basos)      Absolute Eosinophils 0.1 0.0 - 0.7 10e3/uL    Absolute Basophils 0.0 0.0 - 0.2  10e3/uL    Absolute Immature Granulocytes 0.0 <=0.4 10e3/uL    Absolute NRBCs 0.0 10e3/uL   Abdomen, flat/upright (2 views)    Narrative    EXAM: XR ABDOMEN 2 VIEWS  LOCATION: [facility]  DATE: 10/22/2023    INDICATION: right sided abdominal pain, nausea and vomiting, probable bowel obstruction.  COMPARISON: None.      Impression    IMPRESSION: Moderately distended small bowel loops are seen in the left and right abdomen with internal air-fluid levels and small amount of gas mottled stool in nondistended colon, highly suspicious for small bowel obstruction. No intraperitoneal free   air. No appreciable renal calcifications.    Bilateral total hip arthroplasties are intact. Cholecystectomy clips seen in the right upper quadrant. Spinal degenerative changes with moderate L2/L3 disc space narrowing noted.       For the majority of the shift this patient's behavior was Green     Cardiac Rhythm: N/A  Pt needs tele? No  Skin/wound Issues: None    Code Status: Full Code    Pain control: good    Nausea control: good    Abnormal labs/tests/findings requiring intervention:     Patient tested for COVID 19 prior to admission: NO     OBS brochure/video discussed/provided to patient/family: N/A     Family present during ED course? No     Family Comments/Social Situation comments:     Tasks needing completion: None    Orlando Castillo, RN

## 2023-10-22 NOTE — PROGRESS NOTES
"[facility] ADMISSION NOTE    Patient admitted to room ICU2 at approximately 0540 via cart from emergency room. Patient was accompanied by nurse.     Verbal SBAR report received from Orlando BERGER prior to patient arrival.     Patient ambulated to bed with stand-by assist. Patient alert and oriented X 3. Pain is controlled without any medications.  . Admission vital signs: Blood pressure 97/54, pulse 81, temperature 97.9  F (36.6  C), temperature source Oral, resp. rate 20, height 1.676 m (5' 6\"), weight 88.3 kg (194 lb 10.7 oz), SpO2 92%. Patient was oriented to plan of care, call light, bed controls, tv, telephone, bathroom, and visiting hours.     Risk Assessment    The following safety risks were identified during admission: fall. Yellow risk band applied: YES.     Skin Initial Assessment    This writer admitted this patient and completed a full skin assessment and Sulaiman score in the Adult PCS flowsheet. Appropriate interventions initiated as needed. Pt decined , states no issues,    Secondary skin check completed by Georgia BERGER.    Sulaiman Risk Assessment  Sensory Perception: 4-->no impairment  Moisture: 4-->rarely moist  Activity: 3-->walks occasionally  Mobility: 4-->no limitation  Nutrition: 3-->adequate  Friction and Shear: 3-->no apparent problem  Sulaiman Score: 21  Mattress: Low air loss (MORALES pump, Isolibrium, Skin Guard, Pulsate, Isoair, etc.)    Education    Patient has a [place] to Observation order: No  Observation education completed and documented: N/A      Alida Negron RN      "

## 2023-10-23 VITALS
HEART RATE: 53 BPM | BODY MASS INDEX: 31.29 KG/M2 | DIASTOLIC BLOOD PRESSURE: 63 MMHG | HEIGHT: 66 IN | TEMPERATURE: 97.8 F | WEIGHT: 194.67 LBS | RESPIRATION RATE: 17 BRPM | OXYGEN SATURATION: 97 % | SYSTOLIC BLOOD PRESSURE: 138 MMHG

## 2023-10-23 LAB
ANION GAP SERPL CALCULATED.3IONS-SCNC: 8 MMOL/L (ref 7–15)
BUN SERPL-MCNC: 17.4 MG/DL (ref 8–23)
CALCIUM SERPL-MCNC: 8.4 MG/DL (ref 8.8–10.2)
CHLORIDE SERPL-SCNC: 102 MMOL/L (ref 98–107)
CREAT SERPL-MCNC: 0.91 MG/DL (ref 0.67–1.17)
DEPRECATED HCO3 PLAS-SCNC: 29 MMOL/L (ref 22–29)
EGFRCR SERPLBLD CKD-EPI 2021: 87 ML/MIN/1.73M2
ERYTHROCYTE [DISTWIDTH] IN BLOOD BY AUTOMATED COUNT: 13.1 % (ref 10–15)
GLUCOSE SERPL-MCNC: 81 MG/DL (ref 70–99)
HCT VFR BLD AUTO: 40.7 % (ref 40–53)
HGB BLD-MCNC: 13.7 G/DL (ref 13.3–17.7)
MAGNESIUM SERPL-MCNC: 2 MG/DL (ref 1.7–2.3)
MCH RBC QN AUTO: 30.7 PG (ref 26.5–33)
MCHC RBC AUTO-ENTMCNC: 33.7 G/DL (ref 31.5–36.5)
MCV RBC AUTO: 91 FL (ref 78–100)
PLATELET # BLD AUTO: 171 10E3/UL (ref 150–450)
POTASSIUM SERPL-SCNC: 3.6 MMOL/L (ref 3.4–5.3)
RBC # BLD AUTO: 4.46 10E6/UL (ref 4.4–5.9)
SODIUM SERPL-SCNC: 139 MMOL/L (ref 135–145)
WBC # BLD AUTO: 4.7 10E3/UL (ref 4–11)

## 2023-10-23 PROCEDURE — 82310 ASSAY OF CALCIUM: CPT | Performed by: STUDENT IN AN ORGANIZED HEALTH CARE EDUCATION/TRAINING PROGRAM

## 2023-10-23 PROCEDURE — 99239 HOSP IP/OBS DSCHRG MGMT >30: CPT | Performed by: STUDENT IN AN ORGANIZED HEALTH CARE EDUCATION/TRAINING PROGRAM

## 2023-10-23 PROCEDURE — 250N000011 HC RX IP 250 OP 636: Mod: JZ | Performed by: HOSPITALIST

## 2023-10-23 PROCEDURE — 83735 ASSAY OF MAGNESIUM: CPT | Performed by: STUDENT IN AN ORGANIZED HEALTH CARE EDUCATION/TRAINING PROGRAM

## 2023-10-23 PROCEDURE — 85014 HEMATOCRIT: CPT | Performed by: STUDENT IN AN ORGANIZED HEALTH CARE EDUCATION/TRAINING PROGRAM

## 2023-10-23 PROCEDURE — 250N000013 HC RX MED GY IP 250 OP 250 PS 637: Performed by: HOSPITALIST

## 2023-10-23 PROCEDURE — 36415 COLL VENOUS BLD VENIPUNCTURE: CPT | Performed by: STUDENT IN AN ORGANIZED HEALTH CARE EDUCATION/TRAINING PROGRAM

## 2023-10-23 RX ORDER — ONDANSETRON 2 MG/ML
4 INJECTION INTRAMUSCULAR; INTRAVENOUS EVERY 6 HOURS PRN
Status: DISCONTINUED | OUTPATIENT
Start: 2023-10-23 | End: 2023-10-23 | Stop reason: HOSPADM

## 2023-10-23 RX ORDER — ONDANSETRON 4 MG/1
4 TABLET, ORALLY DISINTEGRATING ORAL EVERY 6 HOURS PRN
Status: DISCONTINUED | OUTPATIENT
Start: 2023-10-23 | End: 2023-10-23 | Stop reason: HOSPADM

## 2023-10-23 RX ADMIN — ENOXAPARIN SODIUM 40 MG: 100 INJECTION SUBCUTANEOUS at 07:29

## 2023-10-23 RX ADMIN — GABAPENTIN 100 MG: 100 CAPSULE ORAL at 07:29

## 2023-10-23 RX ADMIN — PANTOPRAZOLE SODIUM 40 MG: 40 TABLET, DELAYED RELEASE ORAL at 06:37

## 2023-10-23 ASSESSMENT — ACTIVITIES OF DAILY LIVING (ADL)
ADLS_ACUITY_SCORE: 26
ADLS_ACUITY_SCORE: 26
ADLS_ACUITY_SCORE: 27
ADLS_ACUITY_SCORE: 26

## 2023-10-23 NOTE — UTILIZATION REVIEW
"[facility] Utilization Review  Admission Status; Secondary Review Determination     Admission Date: 10/22/2023  1:12 AM      Under the authority of the Utilization Management Committee, the utilization review process indicated a secondary review on the above patient.  The review outcome is based on review of the medical records, discussions with staff, and applying clinical experience noted on the date of the review.        (X) Observation Status Appropriate - This patient does not meet hospital inpatient criteria and is placed in observation status. If this patient's primary payer is Medicare and was admitted as an inpatient, Condition Code 44 should be used and patient status changed to \"observation\".   () Observation Status concurrent Review           RATIONALE FOR DETERMINATION   78-year-old male with history of colon cancer status postradiation, resection, appendectomy, cholecystectomy, reported episodes of SBO, BPH, hyperlipidemia admitted with small bowel obstruction.  Patient has had multiple surgeries and radiation to the abdomen, CT suggesting small bowel ileus or early partial obstruction.  Patient had resolution of nausea and vomiting after presentation and NG tube was not placed, patient was on bowel rest and started having bowel movements and feels he is back to normal.  Complex patient with history of recurrent small bowel obstruction, now resolved, and having bowel movements, does not meet criteria for inpatient stay, recommend change to observation status, communicated to Dr. Garza      The severity of illness, intensity of service provided, expected LOS make the care appropriate for observation status at this time.        The information on this document is developed by the utilization review team in order for the business office to ensure compliance.  This only denotes the appropriateness of proper admission status and does not reflect the quality of care rendered.            "   Sincerely,       Brice Steward MD  Physician Advisor  Utilization Review-[place]    Phone: [phone]

## 2023-10-23 NOTE — PROGRESS NOTES
SPIRITUAL HEALTH SERVICES  SPIRITUAL ASSESSMENT Progress Note  [facility] -     Referral Source:    I shared a reflective conversation with Peña which incorporated elements of illness and family narrative along with spiritual history.    - Peña shared his narrative around arriving at [facility] by EMS.  He stated this has happened before so he knows what has to happen before he goes home.    - He talked at length about his life narrative.  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    - He was raised [employer]ism but was involved in the [employer] in [place].  He welcomed a prayer and expressed appreciation for the visit.    Plan:  will monitor patient's ongoing need for support during LOS.      Christopher Martinez M.A., [facility]  Staff Chaplain [facility]  Office: [phone]  Pager [phone]

## 2023-10-23 NOTE — PROGRESS NOTES
[facility] DISCHARGE NOTE    Patient discharged to home at 4:36 PM via wheel chair. Accompanied by sister and staff. Discharge instructions reviewed with patient, opportunity offered to ask questions. Prescriptions - None ordered for discharge. All belongings sent with patient.    Leola Machuca RN

## 2023-10-23 NOTE — DISCHARGE SUMMARY
"[facility]  Hospitalist Discharge Summary      Date of Admission:  10/22/2023  Date of Discharge:  10/23/2023  Discharging Provider: Ba Garza MD  Discharge Service: Hospitalist Service    Discharge Diagnoses   Integrated into hospital course below    Clinically Significant Risk Factors     # Obesity: Estimated body mass index is 31.42 kg/m  as calculated from the following:    Height as of this encounter: 1.676 m (5' 6\").    Weight as of this encounter: 88.3 kg (194 lb 10.7 oz).       Follow-ups Needed After Discharge   Follow-up Appointments     Follow-up and recommended labs and tests       Follow up with primary care provider, [facility], within 7 days to evaluate medication change and for hospital   follow- up.            Unresulted Labs Ordered in the Past 30 Days of this Admission       No orders found for last 31 day(s).        These results will be followed up by Ba Garza MD      Discharge Disposition   Discharged to home  Condition at discharge: Stable    Hospital Course   Peña Hall is a 78 year old male with past medical history of colon cancer s/p radiation and resection 2020, appendectomy, cholecystectomy, recurrent episodes of SBO, BPH, and HLD admitted on 10/22/2023 with SBO.     # Concern for SBO vs ileus   Hx of multiple surgeries and radiation to abd and has had SBO's before. Has chronic diarrhea and has been taken metamucil and lately imodium. CT suggesting either a small bowel ileus or possible early or partial obstruction. With resolution of nausea and vomiting after presentation to the emergency department an NG tube was not placed.  Patient able to pass gas and stool.  Able to tolerate a regular diet on the day of discharge.    # Chronic diarrhea   Multiple colonoscopies and last one was 8/2023 which was unremarkable.  Now having loose stools but had a decrease from his normal frequency.    # Hx hypertension "   # Hypotension   Lower blood pressures since admission with systolic's in th 90's. Denies ant lightlessness or dizziness. Lactate initially 2.1 ans resolved.  With improvement in blood pressure plan to have patient restart her PTA meds.    # CAITLYN   1.37 on presentation currently agreed to decrease to 0.91 on day of discharge.  Suspect prerenal etiology with poor p.o. intake.    # Hyponatremia, resolved  Suspect hypovolumic      # Hypokalemia, resolved  Is also with replacement    # Hyperlipidemia  Restarting PTA statin    # Chronic back pain   Continued PTA Neurontin.  Appears to have been considering spinal cord stimulator placment     # Essential Tremor  - Continued PTA primidone     # Acute normocytic anemia  Hgb drop from 15-12. Suspect dilution. No signs of bleeding.     # BPH  - Continued tamsulosin    Consultations This Hospital Stay   SPIRITUAL HEALTH SERVICES IP CONSULT    Code Status   Full Code    Time Spent on this Encounter   I, Ba Garza MD, personally saw the patient today and spent greater than 30 minutes discharging this patient.       Ba Garza MD  [facility] INTENSIVE [facility]ARE  [address] [place] [id number]  Phone: [phone]  Fax: [phone]  ______________________________________________________________________    Physical Exam   Vital Signs: Temp: 97.8  F (36.6  C) Temp src: Oral BP: 138/63 Pulse: 53   Resp: 17 SpO2: 97 % O2 Device: None (Room air)    Weight: 194 lbs 10.66 oz  General Appearance: Awake and alert, sitting up in bed, in no acute distress  Respiratory: Breathing easily on room air  Cardiovascular: Regular rate and rhythm, extremities warm and well-perfused  GI: Abdomen soft, mildly tender to palpation in lower quadrants, without rebound or guarding  Skin: No rashes or lesions  Other: Appropriate mood and affect, no focal deficits appreciated       Primary Care Physician   [facility]    Discharge Orders       Reason for your hospital stay    You were hospitalized with concern for small bowel obstruction which resolved.     Follow-up and recommended labs and tests     Follow up with primary care provider, [facility], within 7 days to evaluate medication change and for hospital follow- up.     Activity    Your activity upon discharge: activity as tolerated     Diet    Follow this diet upon discharge: Orders Placed This Encounter      Advance Diet as Tolerated: Regular Diet Adult       Significant Results and Procedures   Most Recent 3 CBC's:  Recent Labs   Lab Test 10/23/23  0649 10/22/23  0553 10/22/23  0123   WBC 4.7 6.9 6.7   HGB 13.7 12.4* 15.3   MCV 91 89 90    160 206     Most Recent 3 BMP's:  Recent Labs   Lab Test 10/23/23  0649 10/22/23  1521 10/22/23  0553 10/22/23  0123     --  130* 132*   POTASSIUM 3.6 3.8 3.2* 3.1*   CHLORIDE 102  --  93* 90*   CO2 29  --  28 26   BUN 17.4  --  33.0* 32.1*   CR 0.91  --  1.24* 1.37*   ANIONGAP 8  --  9 16*   SARAVANAN 8.4*  --  7.7* 8.5*   GLC 81  --  140* 122*   ,   Results for orders placed or performed during the hospital encounter of 10/22/23   Abdomen, flat/upright (2 views)    Narrative    EXAM: XR ABDOMEN 2 VIEWS  LOCATION: [facility]  DATE: 10/22/2023    INDICATION: right sided abdominal pain, nausea and vomiting, probable bowel obstruction.  COMPARISON: None.      Impression    IMPRESSION: Moderately distended small bowel loops are seen in the left and right abdomen with internal air-fluid levels and small amount of gas mottled stool in nondistended colon, highly suspicious for small bowel obstruction. No intraperitoneal free   air. No appreciable renal calcifications.    Bilateral total hip arthroplasties are intact. Cholecystectomy clips seen in the right upper quadrant. Spinal degenerative changes with moderate L2/L3 disc space narrowing noted.   CT Abdomen Pelvis w Contrast    Narrative    EXAM: CT  ABDOMEN AND PELVIS WITH CONTRAST  LOCATION: [facility]  DATE/TIME: 10/22/2023, 4:32 AM CDT    INDICATION: Abdominal pain. Small bowel obstruction. Low blood pressure.  COMPARISON: None.    TECHNIQUE: CT scan of the abdomen and pelvis was performed following injection of IV contrast. Multiplanar reformats were obtained. Dose reduction techniques were used.  CONTRAST: 100 mL Isovue 370.    FINDINGS:    LOWER CHEST: Coronary artery calcification.    HEPATOBILIARY: The gallbladder is not visualized.    SPLEEN: A few tiny calcified granulomas in the spleen.    PANCREAS: Unremarkable.    ADRENAL GLANDS: Unremarkable.    KIDNEYS/BLADDER: No suspicious renal lesions. The urinary bladder is obscured by streak artifact from bilateral hip arthroplasties.    BOWEL: Small hiatal hernia. A long segment of mildly distended fluid-filled small bowel is present in the mid abdomen and pelvis and the more distal small bowel is relatively decompressed. A few areas of mild wall thickening scattered within the   distended small bowel. The appendix is not visualized. No pneumatosis or free intraperitoneal gas.    LYMPH NODES: Unremarkable.    PELVIC ORGANS: No acute findings.    MUSCULOSKELETAL: Partial visualization of bilateral hip arthroplasties.    OTHER: Atherosclerotic calcification in the abdominal aorta. Small supraumbilical hernia containing fat.      Impression    IMPRESSION:   1.  A long segment of mildly distended fluid-filled small bowel is present in the abdomen and pelvis with the more distal small bowel relatively decompressed. These findings are suggestive of either a small bowel ileus or possibly an early or partial   distal small bowel obstruction.   2.  A few areas of wall thickening of the distended small bowel are nonspecific, but could relate to infectious or inflammatory enteritis.  3.  No other acute abnormality identified in the abdomen or pelvis.           Discharge Medications    Current Discharge Medication List        CONTINUE these medications which have NOT CHANGED    Details   acetaminophen (TYLENOL) 650 MG CR tablet Take 1,300 mg by mouth every evening      albuterol (PROAIR HFA/PROVENTIL HFA/VENTOLIN HFA) 108 (90 Base) MCG/ACT inhaler Inhale 2 puff using inhaler every four hours as needed for wheezing. pretreat before activity      atorvastatin (LIPITOR) 20 MG tablet Take 1 tablet by mouth every evening      bisacodyl (DULCOLAX) 5 MG EC tablet Take 2 tablets at 3 pm the day before your procedure. If your procedure is before 11 am, take 2 additional tablets at 11 pm. If your procedure is after 11 am, take 2 additional tablets at 6 am. For additional instructions refer to your colonoscopy prep instructions.  Qty: 4 tablet, Refills: 0    Associated Diagnoses: Special screening for malignant neoplasms, colon      cholecalciferol (VITAMIN D3) 125 mcg (5000 units) capsule Take 125 mcg by mouth daily      gabapentin (NEURONTIN) 100 MG capsule Take 100 mg by mouth every morning      hydrochlorothiazide (HYDRODIURIL) 25 MG tablet Take 1 tablet by mouth daily      lisinopril (ZESTRIL) 40 MG tablet Take 1 tablet by mouth daily      meloxicam (MOBIC) 15 MG tablet Take 1 tablet by mouth daily      polyethylene glycol (GOLYTELY) 236 g suspension The night before the exam at 6 pm drink an 8-ounce glass every 15 minutes until the jug is half empty. If you arrive before 11 AM: Drink the other half of the Golytely jug at 11 PM night before procedure. If you arrive after 11 AM: Drink the other half of the Golytely jug at 6 AM day of procedure. For additional instructions refer to your colonoscopy prep instructions.  Qty: 4000 mL, Refills: 0    Associated Diagnoses: Special screening for malignant neoplasms, colon      primidone (MYSOLINE) 250 MG tablet Take 500 mg by mouth every evening      propranolol ER (INDERAL LA) 60 MG 24 hr capsule Take 60 mg by mouth daily      tamsulosin (FLOMAX) 0.4 MG  capsule Take 0.8 mg by mouth daily           Allergies   No Known Allergies

## 2023-10-23 NOTE — PLAN OF CARE
Goal Outcome Evaluation:      Plan of Care Reviewed With: patient. No new medications, activity as tolerated, diet as tolerated.

## 2023-10-23 NOTE — PROGRESS NOTES
Patient alert and oriented x4. Pleasant and cooperative with staff. Up with SBA, ambulated halls x2 this shift. Slept well throughout night. X2 soft md BM's early this AM. BP improving. Makes needs known.

## 2023-10-24 ENCOUNTER — PATIENT OUTREACH (OUTPATIENT)
Dept: CARE COORDINATION | Facility: CLINIC | Age: 78
End: 2023-10-24
Payer: COMMERCIAL

## 2023-10-24 NOTE — PROGRESS NOTES
"CHW offered Clinic Care Coordination to an established [facility] eligible patient and patient declined CCC at this time.     Clinic Care Coordination Contact  [facility]: Post-Discharge Note  SITUATION                                                      Admission:    Admission Date: 10/22/23   Reason for Admission: Abdominal pain  Discharge:   Discharge Date: 10/23/23  Discharge Diagnosis: SBO (small bowel obstruction) (H)    BACKGROUND                                                      Per hospital discharge summary and inpatient provider notes:    Peña Hall is a 78 year old male with history of previous colon cancer, appendectomy and cholecystectomy as well as multiple recurrent bowel obstructions presented for evaluation of several days of abdominal pain with nausea and vomiting.  Last bowel movement was 3 days ago.  Patient has been able to successfully manage some partial obstructions at home in the past and tried to do so over the past few days but tonight continued to feel nauseated and vomited again so came in for evaluation.  Denies fever but was feeling chilled earlier tonight.  Denies chest pain or difficulty breathing.  Reports abdominal bloating and right-sided abdominal pain.  Pain has subsided somewhat after his last emesis about an hour before ED arrival.  Currently having minimal nausea.  Has never required surgical intervention for his bowel obstructions.     ASSESSMENT      Discharge Assessment  How are you doing now that you are home?: \"Okay...yeah I'm feeling pretty good yet today. A little weak yesterday but better today.\"  How are your symptoms? (Red Flag symptoms escalate to triage hotline per guidelines): Improved  Do you feel your condition is stable enough to be safe at home until your provider visit?: Yes  Does the patient have their discharge instructions? : Yes  Does the patient have questions regarding their discharge instructions? : No  Were you started on any new " medications or were there changes to any of your previous medications? : No  Does the patient have all of their medications?: Yes  Do you have questions regarding any of your medications? : No  Do you have all of your needed medical supplies or equipment (DME)?  (i.e. oxygen tank, CPAP, cane, etc.): Yes  Discharge follow-up appointment scheduled within 14 calendar days? : Yes  Discharge Follow Up Appointment Date: 11/06/23  Discharge Follow Up Appointment Scheduled with?: Primary Care Provider    Post-op ([facility] CTA Only)  If the patient had a surgery or procedure, do they have any questions for a nurse?: No    PLAN                                                      Outpatient Plan:     Follow-ups Needed After Discharge  Follow-up Appointments     Follow-up and recommended labs and tests       Follow up with primary care provider, [facility], within 7 days to evaluate medication change and for hospital   follow- up.      Future Appointments   Date Time Provider Department Center   11/6/2023  9:30 AM Matthew Rodney MD [facility]         For any urgent concerns, please contact our 24 hour nurse triage line: [phone] ([phone])         MILADIS Anders  [phone]  [facility]  [facility]

## 2023-11-06 ENCOUNTER — OFFICE VISIT (OUTPATIENT)
Dept: FAMILY MEDICINE | Facility: CLINIC | Age: 78
End: 2023-11-06
Payer: COMMERCIAL

## 2023-11-06 VITALS
HEIGHT: 66 IN | SYSTOLIC BLOOD PRESSURE: 156 MMHG | WEIGHT: 192 LBS | DIASTOLIC BLOOD PRESSURE: 74 MMHG | TEMPERATURE: 98.9 F | OXYGEN SATURATION: 98 % | HEART RATE: 74 BPM | RESPIRATION RATE: 16 BRPM | BODY MASS INDEX: 30.86 KG/M2

## 2023-11-06 DIAGNOSIS — K52.9 CHRONIC DIARRHEA: Chronic | ICD-10-CM

## 2023-11-06 DIAGNOSIS — Z11.59 NEED FOR HEPATITIS C SCREENING TEST: ICD-10-CM

## 2023-11-06 DIAGNOSIS — N40.1 BENIGN PROSTATIC HYPERPLASIA WITH LOWER URINARY TRACT SYMPTOMS, SYMPTOM DETAILS UNSPECIFIED: ICD-10-CM

## 2023-11-06 DIAGNOSIS — E78.5 HYPERLIPIDEMIA LDL GOAL <130: ICD-10-CM

## 2023-11-06 DIAGNOSIS — Z00.00 MEDICARE ANNUAL WELLNESS VISIT, SUBSEQUENT: Primary | ICD-10-CM

## 2023-11-06 DIAGNOSIS — Z29.11 NEED FOR VACCINATION AGAINST RESPIRATORY SYNCYTIAL VIRUS: ICD-10-CM

## 2023-11-06 DIAGNOSIS — M54.50 CHRONIC MIDLINE LOW BACK PAIN WITHOUT SCIATICA: Chronic | ICD-10-CM

## 2023-11-06 DIAGNOSIS — G89.29 CHRONIC MIDLINE LOW BACK PAIN WITHOUT SCIATICA: Chronic | ICD-10-CM

## 2023-11-06 DIAGNOSIS — I10 HYPERTENSION, UNSPECIFIED TYPE: ICD-10-CM

## 2023-11-06 PROBLEM — N28.9 RENAL INSUFFICIENCY: Status: RESOLVED | Noted: 2023-10-22 | Resolved: 2023-11-06

## 2023-11-06 PROBLEM — K56.609 SBO (SMALL BOWEL OBSTRUCTION) (H): Status: RESOLVED | Noted: 2023-10-22 | Resolved: 2023-11-06

## 2023-11-06 PROBLEM — E87.6 HYPOKALEMIA: Status: RESOLVED | Noted: 2023-10-22 | Resolved: 2023-11-06

## 2023-11-06 PROCEDURE — 99214 OFFICE O/P EST MOD 30 MIN: CPT | Mod: 25 | Performed by: FAMILY MEDICINE

## 2023-11-06 PROCEDURE — G0439 PPPS, SUBSEQ VISIT: HCPCS | Mod: 25 | Performed by: FAMILY MEDICINE

## 2023-11-06 PROCEDURE — 90677 PCV20 VACCINE IM: CPT | Performed by: FAMILY MEDICINE

## 2023-11-06 PROCEDURE — G0009 ADMIN PNEUMOCOCCAL VACCINE: HCPCS | Performed by: FAMILY MEDICINE

## 2023-11-06 RX ORDER — CHOLESTYRAMINE 4 G/9G
1 POWDER, FOR SUSPENSION ORAL 2 TIMES DAILY WITH MEALS
Qty: 60 PACKET | Refills: 11 | Status: SHIPPED | OUTPATIENT
Start: 2023-11-06 | End: 2024-02-26

## 2023-11-06 RX ORDER — RESPIRATORY SYNCYTIAL VIRUS VACCINE 120MCG/0.5
0.5 KIT INTRAMUSCULAR ONCE
Qty: 1 EACH | Refills: 0 | Status: CANCELLED | OUTPATIENT
Start: 2023-11-06 | End: 2023-11-06

## 2023-11-06 ASSESSMENT — ACTIVITIES OF DAILY LIVING (ADL): CURRENT_FUNCTION: NO ASSISTANCE NEEDED

## 2023-11-06 ASSESSMENT — ENCOUNTER SYMPTOMS: DIARRHEA: 1

## 2023-11-06 ASSESSMENT — PAIN SCALES - GENERAL: PAINLEVEL: NO PAIN (0)

## 2023-11-06 NOTE — PROGRESS NOTES
"SUBJECTIVE:   Peña is a 78 year old who presents for Preventive Visit.      11/6/2023     9:26 AM   Additional Questions   Roomed by donis NEWBERRY   Accompanied by daughter ibeth       Are you in the first 12 months of your Medicare coverage?  No    Healthy Habits:     In general, how would you rate your overall health?  Fair    Frequency of exercise:  None    Do you usually eat at least 4 servings of fruit and vegetables a day, include whole grains    & fiber and avoid regularly eating high fat or \"junk\" foods?  No    Taking medications regularly:  Yes    Medication side effects:  None    Ability to successfully perform activities of daily living:  No assistance needed    Home Safety:  No safety concerns identified    Hearing Impairment:  Difficulty following a conversation in a noisy restaurant or crowded room, feel that people are mumbling or not speaking clearly, difficulty following dialogue in the theater, difficult to understand a speaker at a public meeting or Hindu service, need to ask people to speak up or repeat themselves, difficulty understanding soft or whispered speech and difficulty understanding speech on the telephone    In the past 6 months, have you been bothered by leaking of urine?  No    In general, how would you rate your overall mental or emotional health?  Good    Additional concerns today:  No    Diarrhea: chronic since colon surgery, gall bladder surgery contrituting too?  Dtr wants him to  try questran.  Immodium possibly caused ileus.  Is hesitant to start that.  Biopsies have shown no underlying cause, recent colonoscopy  Htn: stable overall, up  a bit today  Bph: flomax  Chronic pain: gabapentin, back pain  Tremor: primidone and propranolol.   Diarrhea worsened with primidone?       Today's PHQ-2 Score:       11/6/2023     9:18 AM   PHQ-2 ( 1999 [employer])   Q1: Little interest or pleasure in doing things 0   Q2: Feeling down, depressed or hopeless 0   PHQ-2 Score 0   Q1: Little " interest or pleasure in doing things Not at all   Q2: Feeling down, depressed or hopeless Not at all   PHQ-2 Score 0           Have you ever done Advance Care Planning? (For example, a Health Directive, POLST, or a discussion with a medical provider or your loved ones about your wishes):       Fall risk  Fallen 2 or more times in the past year?: No  Any fall with injury in the past year?: No    Cognitive Screening   1) Repeat 3 items (Leader, Season, Table)    2) Clock draw: NORMAL  3) 3 item recall: Recalls 1 object   Results: NORMAL clock, 1-2 items recalled: COGNITIVE IMPAIRMENT LESS LIKELY    Mini-CogTM Copyright ELISA Mo. Licensed by the author for use in [facility]; reprinted with permission (winnie@[facility]). All rights reserved.      Do you have sleep apnea, excessive snoring or daytime drowsiness? : no    Reviewed and updated as needed this visit by clinical staff                  Reviewed and updated as needed this visit by Provider                 Social History     Tobacco Use    Smoking status: Not on file    Smokeless tobacco: Not on file   Substance Use Topics    Alcohol use: Not on file             11/6/2023     9:18 AM   Alcohol Use   Prescreen: >3 drinks/day or >7 drinks/week? No          No data to display              Do you have a current opioid prescription? No            Current providers sharing in care for this patient include:   Patient Care Team:  Clinic - [facility] as PCP - General    The following health maintenance items are reviewed in Epic and correct as of today:  Health Maintenance   Topic Date Due    ANNUAL REVIEW OF HM ORDERS  Never done    ADVANCE CARE PLANNING  Never done    HEPATITIS C SCREENING  Never done    LIPID  Never done    RSV VACCINE (Pregnancy & 60+) (1 - 1-dose 60+ series) Never done    MEDICARE ANNUAL WELLNESS VISIT  Never done    Pneumococcal Vaccine: 65+ Years (3 - PPSV23 or PCV20) 09/22/2020    FALL RISK ASSESSMENT  11/06/2024     "COLORECTAL CANCER SCREENING  08/18/2028    DTAP/TDAP/TD IMMUNIZATION (5 - Td or Tdap) 04/28/2033    PHQ-2 (once per calendar year)  Completed    INFLUENZA VACCINE  Completed    ZOSTER IMMUNIZATION  Completed    COVID-19 Vaccine  Completed    IPV IMMUNIZATION  Aged Out    HPV IMMUNIZATION  Aged Out    MENINGITIS IMMUNIZATION  Aged Out    RSV MONOCLONAL ANTIBODY  Aged Out             Review of Systems   HENT:  Positive for hearing loss.    Gastrointestinal:  Positive for diarrhea.         OBJECTIVE:   BP (!) 156/74   Pulse 74   Temp 98.9  F (37.2  C) (Tympanic)   Resp 16   Ht 1.664 m (5' 5.5\")   Wt 87.1 kg (192 lb)   SpO2 98%   BMI 31.46 kg/m   Estimated body mass index is 31.42 kg/m  as calculated from the following:    Height as of 10/22/23: 1.676 m (5' 6\").    Weight as of 10/22/23: 88.3 kg (194 lb 10.7 oz).  Physical Exam  Neck: neck supple without mass or lymphadenopathy  CV: RRR, no murmur  RESP: lungs clear bilaterally, good effort  EXT: no edema or lesions noted in lower extremities  Normal gait      ASSESSMENT / PLAN:   Wellness visit  Diarrhea: chronic since colon surgery, gall bladder surgery contrituting too?  Dtr wants him to  try questran.  Immodium possibly caused ileus.  Is hesitant to start that.  Biopsies have shown no underlying cause, recent colonoscopy  Htn: stable overall, up  a bit today  Bph: flomax  Chronic pain: gabapentin, back pain  Tremor: primidone and propranolol.   Diarrhea worsened with primidone?     Post Medication Reconciliation Status: discharge medications reconciled and changed, per note/orders      Try questran.  If not working, stop.  Then try tapering off primidone.  Restart if no change.  Stay off if a big change    Follow-up when due for fills on all meds       COUNSELING:  Reviewed preventive health counseling, as reflected in patient instructions       Regular exercise       Healthy diet/nutrition      BMI:   Estimated body mass index is 31.42 kg/m  as calculated " "from the following:    Height as of 10/22/23: 1.676 m (5' 6\").    Weight as of 10/22/23: 88.3 kg (194 lb 10.7 oz).   Weight management plan: diet/exericse       He has no history on file for tobacco use.      Appropriate preventive services were discussed with this patient, including applicable screening as appropriate for fall prevention, nutrition, physical activity, Tobacco-use cessation, weight loss and cognition.  Checklist reviewing preventive services available has been given to the patient.    Reviewed patients plan of care and provided an AVS. The Basic Care Plan (routine screening as documented in Health Maintenance) for Peña meets the Care Plan requirement. This Care Plan has been established and reviewed with the Patient and daughter.      Matthew Rodney MD  [facility]    Identified Health Risks:  I have reviewed Opioid Use Disorder and Substance Use Disorder risk factors and made any needed referrals.   "

## 2023-11-18 ENCOUNTER — MYC MEDICAL ADVICE (OUTPATIENT)
Dept: FAMILY MEDICINE | Facility: CLINIC | Age: 78
End: 2023-11-18
Payer: COMMERCIAL

## 2023-11-20 NOTE — TELEPHONE ENCOUNTER
Need to discuss at next visit (when all meds need refilling) given medicare documentation requirements.  There are a lot of checklist type things that need to be clarified before we order the CT screening test for lungs, especially since he's new to our clinic.

## 2023-11-21 ENCOUNTER — TELEPHONE (OUTPATIENT)
Dept: FAMILY MEDICINE | Facility: CLINIC | Age: 78
End: 2023-11-21
Payer: COMMERCIAL

## 2023-11-21 DIAGNOSIS — Z87.891 FORMER SMOKER: Primary | ICD-10-CM

## 2023-11-21 NOTE — TELEPHONE ENCOUNTER
Reason for Call:  Appointment Request    Patient requesting this type of appt: Chronic Diease Management/Medication/Follow-Up    Requested provider: [facility] Jovan Rodney    Reason patient unable to be scheduled: Not within requested timeframe    When does patient want to be seen/preferred time: 1-2 weeks    Comments: Peña is requesting an appointment for a referral to get a CT.  Please call him back.      Could we send this information to you in [employer] or would you prefer to receive a phone call?:   Patient would prefer a phone call   Okay to leave a detailed message?: Yes at Cell number on file:    Telephone Information:   Mobile [phone]       Call taken on 11/21/2023 at 3:18 PM by Maricel Smiley

## 2023-11-22 NOTE — TELEPHONE ENCOUNTER
I talked with Peña. He is asking if Dr Rodney can order low dose CT chest.  He quit smoking about 8 years ago and has had one every year since.    Peña is aware that Dr Rodney is out of office this week and this is OK to wait.  Dariela Ponce RN

## 2023-11-25 ENCOUNTER — HOSPITAL ENCOUNTER (OUTPATIENT)
Dept: CT IMAGING | Facility: CLINIC | Age: 78
Discharge: HOME | End: 2023-11-25
Attending: FAMILY MEDICINE | Admitting: FAMILY MEDICINE
Payer: COMMERCIAL

## 2023-11-25 DIAGNOSIS — Z87.891 FORMER SMOKER: ICD-10-CM

## 2023-11-25 PROCEDURE — 71271 CT THORAX LUNG CANCER SCR C-: CPT

## 2023-11-27 ENCOUNTER — TELEPHONE (OUTPATIENT)
Dept: FAMILY MEDICINE | Facility: CLINIC | Age: 78
End: 2023-11-27
Payer: COMMERCIAL

## 2023-11-27 DIAGNOSIS — R91.8 ABNORMAL CT LUNG SCREENING: ICD-10-CM

## 2023-11-27 NOTE — TELEPHONE ENCOUNTER
Patient Quality Outreach    Patient is due for the following:   Hypertension -  BP check    Next Steps:   Schedule a nurse only visit for BP    Type of outreach:    Phone, spoke to patient/parent. Bp checking at home 120/72       Questions for provider review:    None           Lorenza Dejesus MA

## 2023-11-27 NOTE — TELEPHONE ENCOUNTER
[facility] Radiology Lung Cancer Screening CT result notification:     LDCT/Lung Cancer Screening CT Exam date: 23  Radiologist Impression AND Recommendations:   1.  Multiple pulmonary nodules including a 6 mm nodule in the right lower lobe, probably benign. Recommend a low-dose chest CT in 6 months per LungRADS Criteria.  2.  Moderate coronary artery calcifications.     LungRADS CATEGORY: 3S: Probably Benign.  -Solid nodule(s): Greater than or equal to 6 mm to <8 mm at baseline OR new nodule 4 mm to <6 mm  -Subsolid nodule(s): Greater than or equal to 6 mm total diameter with solid component <6 mm total diameter OR new <6 mm total diameter  -Ground glass nodule(s): Greater than or equal to 30 mm on baseline CT or new  -Atypical pulmonary cyst: growing cystic component (mean diameter) of a thick-walled cyst  -Category 4A nodule that is stable or decreased in size at 3-month follow-up     RADIOLOGIST RECOMMENDATION(S): Low-dose CT chest in 6 months.   Pertinent Findings:  NODULES: Right lower lobe pulmonary nodule measuring 6 mm (series 3, image image 218).      Multiple additional sub-6 mm bilateral pulmonary nodules includin mm right middle lobe nodule (series 3, image 188). 2 mm right upper lobe nodule (series 3, image 131). Left lower lobe nodule measuring 2 mm (series 3, image 176). Left upper lobe   nodule measuring 3 mm (series 3, image 44).      Multiple scattered calcified pulmonary granulomas bilateral     Ordering Provider: Matthew Rodney MD Daniel E Edson did receive the remaining radiology results from their PCP.        Lung Nodule Program Protocol recommendation [Pertaining to lung nodules]:    Lung RADS 3 Protocol: Results RN to notify Patient of results/recommendations and place order for 6 month CT ([id number]) - MD edmonds  CT Chest order ([id number]) placed to be completed within 6 months (Yes/No):  NO, due on or after 2024.  Image scheduling will contact Patient 1 month  prior to due date.  We will await comparisons and re-read. CT [id number] pended.      RN communicated the lung nodule finding to the patient (Yes/No):  YES  The patient had the following questions: NONE  Correct letter sent as per [facility] Lung nodule protocol (Yes/No):  YES  Did Patient have any CT's of chest previous? (Yes/No/NA) Yes, says he has had 7 - [place], [place] , [place] [facility] - most recent 12/6/22  If no comparisons used, inquire if patient has had any chest CT's in the past and if so, request priors.    If scheduling LDCT only, RN will contact Image Scheduling Team  Hours available (all sites below):  Mon-Fri 7A to 7P; Sat 7A to 3:30P.  No schedulers available on Sunday.  [facility] ([facility]): [phone]  North region ([place], [place]): [phone]  South region ([place] and [place]): [phone]  East region ([facility]): [phone]    Lung Nodule Clinics  Pulomonary (Lung Care) Clinic at the [facility]  Floor 2, Check-In D, [address], [place], [place]  Hours: Mon - Fri 7:00 AM to 5:00 PM  [facility] [place] [facility]   Floor 2, [address], [place]  [phone]  Hours: Mon - Fri 7:00 AM - 7:00 PM;  Sat 8:00 AM to 12:00 PM (noon)  [facility] at [facility]  [address] [place], [place], [phone]  Hours: Mon - Fri 8:00 AM - 4:30 PM  [facility]  [address] [facility] [address], [place] [address]  Hours: Mon-Thurs 7:00 AM- 6:30 PM;  Friday 7:00 AM- 5:30 PM  [facility] [place]  [address] [place] [address]  [phone]  Hours: Mon -Thurs 7:00 AM-7:00 PM;  Friday 12:00 PM (noon) - 4 PM      Shar Talavera RN  [facility]  Lung Nodule and Emergency Dept Lab Result  CELINE  # [phone]

## 2023-12-06 NOTE — TELEPHONE ENCOUNTER
[facility] Radiology Lung Cancer Screening CT result     Patient/parent Name  Jean    Reason for call  Notify of new recommendation after Chest CT obtained    Radiology Result    Recommendations/Instructions  Peña notified that he can followup with PCP for annual chest Ct in one year.    Byron Dahl RN  [facility]  Lung Nodule and Emergency Dept Lab Result RN  Ph# [phone]

## 2024-01-03 ENCOUNTER — OFFICE VISIT (OUTPATIENT)
Dept: FAMILY MEDICINE | Facility: CLINIC | Age: 79
End: 2024-01-03
Payer: COMMERCIAL

## 2024-01-03 VITALS
RESPIRATION RATE: 18 BRPM | BODY MASS INDEX: 31.66 KG/M2 | DIASTOLIC BLOOD PRESSURE: 80 MMHG | HEIGHT: 66 IN | WEIGHT: 197 LBS | OXYGEN SATURATION: 98 % | HEART RATE: 60 BPM | TEMPERATURE: 97.8 F | SYSTOLIC BLOOD PRESSURE: 130 MMHG

## 2024-01-03 DIAGNOSIS — N40.1 BENIGN PROSTATIC HYPERPLASIA WITH URINARY HESITANCY: ICD-10-CM

## 2024-01-03 DIAGNOSIS — I10 HYPERTENSION, UNSPECIFIED TYPE: ICD-10-CM

## 2024-01-03 DIAGNOSIS — E78.5 HYPERLIPIDEMIA LDL GOAL <130: Primary | ICD-10-CM

## 2024-01-03 DIAGNOSIS — M54.50 CHRONIC MIDLINE LOW BACK PAIN WITHOUT SCIATICA: Chronic | ICD-10-CM

## 2024-01-03 DIAGNOSIS — R39.11 BENIGN PROSTATIC HYPERPLASIA WITH URINARY HESITANCY: ICD-10-CM

## 2024-01-03 DIAGNOSIS — G89.29 CHRONIC MIDLINE LOW BACK PAIN WITHOUT SCIATICA: Chronic | ICD-10-CM

## 2024-01-03 DIAGNOSIS — R25.1 TREMOR: ICD-10-CM

## 2024-01-03 DIAGNOSIS — J45.20 MILD INTERMITTENT ASTHMA WITHOUT COMPLICATION: ICD-10-CM

## 2024-01-03 PROCEDURE — 99214 OFFICE O/P EST MOD 30 MIN: CPT | Performed by: FAMILY MEDICINE

## 2024-01-03 RX ORDER — PRIMIDONE 250 MG/1
500 TABLET ORAL EVERY EVENING
Qty: 180 TABLET | Refills: 3 | Status: SHIPPED | OUTPATIENT
Start: 2024-01-03

## 2024-01-03 RX ORDER — PRIMIDONE 250 MG/1
500 TABLET ORAL EVERY EVENING
Qty: 90 TABLET | Refills: 3 | Status: SHIPPED | OUTPATIENT
Start: 2024-01-03 | End: 2024-01-03

## 2024-01-03 RX ORDER — GABAPENTIN 300 MG/1
300 CAPSULE ORAL 3 TIMES DAILY
Qty: 270 CAPSULE | Refills: 3 | Status: SHIPPED | OUTPATIENT
Start: 2024-01-03 | End: 2024-03-25

## 2024-01-03 RX ORDER — HYDROCHLOROTHIAZIDE 25 MG/1
25 TABLET ORAL DAILY
Qty: 90 TABLET | Refills: 3 | Status: SHIPPED | OUTPATIENT
Start: 2024-01-03 | End: 2024-03-25 | Stop reason: DRUGHIGH

## 2024-01-03 RX ORDER — FINASTERIDE 5 MG/1
5 TABLET, FILM COATED ORAL DAILY
Qty: 90 TABLET | Refills: 3 | Status: SHIPPED | OUTPATIENT
Start: 2024-01-03

## 2024-01-03 RX ORDER — PROPRANOLOL HCL 60 MG
60 CAPSULE, EXTENDED RELEASE 24HR ORAL DAILY
Qty: 90 CAPSULE | Refills: 3 | Status: SHIPPED | OUTPATIENT
Start: 2024-01-03 | End: 2024-02-26 | Stop reason: ALTCHOICE

## 2024-01-03 RX ORDER — ALBUTEROL SULFATE 90 UG/1
AEROSOL, METERED RESPIRATORY (INHALATION)
Qty: 18 G | Refills: 1 | Status: SHIPPED | OUTPATIENT
Start: 2024-01-03

## 2024-01-03 RX ORDER — ATORVASTATIN CALCIUM 20 MG/1
20 TABLET, FILM COATED ORAL EVERY EVENING
Qty: 90 TABLET | Refills: 3 | Status: SHIPPED | OUTPATIENT
Start: 2024-01-03

## 2024-01-03 RX ORDER — TAMSULOSIN HYDROCHLORIDE 0.4 MG/1
0.8 CAPSULE ORAL DAILY
Qty: 180 CAPSULE | Refills: 3 | Status: SHIPPED | OUTPATIENT
Start: 2024-01-03

## 2024-01-03 RX ORDER — LISINOPRIL 40 MG/1
40 TABLET ORAL DAILY
Qty: 90 TABLET | Refills: 3 | Status: SHIPPED | OUTPATIENT
Start: 2024-01-03

## 2024-01-03 ASSESSMENT — PAIN SCALES - GENERAL: PAINLEVEL: MILD PAIN (2)

## 2024-01-03 NOTE — PROGRESS NOTES
"S: Peña Hall is a 78 year old male with     Back pain: willing to try less mobic.  Tylenol helps.  Goes to pain clinic for stimulator.  More gabapentin?   Tremor: improved on primidone.  Wonders if diarrhea affected by this.   Htn: stable at home, refills.  Reviewed labs  BPH: worsening over t la.  Add something to flomax 2 tabs day?   Asthma: mild, fills on albuterol.  Occasional use    Current Outpatient Medications   Medication    albuterol (PROAIR HFA/PROVENTIL HFA/VENTOLIN HFA) 108 (90 Base) MCG/ACT inhaler    atorvastatin (LIPITOR) 20 MG tablet    cholecalciferol (VITAMIN D3) 125 mcg (5000 units) capsule    cholestyramine (QUESTRAN) 4 g packet    finasteride (PROSCAR) 5 MG tablet    gabapentin (NEURONTIN) 300 MG capsule    hydrochlorothiazide (HYDRODIURIL) 25 MG tablet    lisinopril (ZESTRIL) 40 MG tablet    meloxicam (MOBIC) 15 MG tablet    polyethylene glycol (GOLYTELY) 236 g suspension    primidone (MYSOLINE) 250 MG tablet    propranolol ER (INDERAL LA) 60 MG 24 hr capsule    tamsulosin (FLOMAX) 0.4 MG capsule     No current facility-administered medications for this visit.       O:/80   Pulse 60   Temp 97.8  F (36.6  C) (Tympanic)   Resp 18   Ht 1.664 m (5' 5.5\")   Wt 89.4 kg (197 lb)   SpO2 98%   BMI 32.28 kg/m    GEN: Alert and oriented, in no acute distress    A: Back pain: willing to try less mobic.  Tylenol helps.    Tremor: improved on primidone.    Htn: stable at home, refills.    BPH: worsening over t la.   Asthma: mild, fills on albuterol.      P: less mobic.  Tid tylenol.  Up on gabapentin dose.  Add finasteride, to urology if not improving or worsening    Continue medications for medical issues as above in med list and orders    fills on meds for chronic issues as above in med list and orders.      Follow-up in 6-12  months       "

## 2024-01-05 ENCOUNTER — TELEPHONE (OUTPATIENT)
Dept: FAMILY MEDICINE | Facility: CLINIC | Age: 79
End: 2024-01-05
Payer: COMMERCIAL

## 2024-01-05 NOTE — TELEPHONE ENCOUNTER
Patient calls into clinic to verify gabapentin dosage. He was seen in clinic 1/3/24 and gabapentin increased. He was under the impression he should be increased to 300 mg once daily. He was previously on 100mg daily. New prescription says 300mg 3 times daily. Routing to  for clarification.     Nida Anglin RN

## 2024-01-08 NOTE — TELEPHONE ENCOUNTER
I gave him that much in case he wanted to try moving up to 3x/day over time.  If he wants to  stay just using at night, that's OK too, but could do more, as above.

## 2024-01-18 ASSESSMENT — ASTHMA QUESTIONNAIRES
QUESTION_1 LAST FOUR WEEKS HOW MUCH OF THE TIME DID YOUR ASTHMA KEEP YOU FROM GETTING AS MUCH DONE AT WORK, SCHOOL OR AT HOME: A LITTLE OF THE TIME
QUESTION_3 LAST FOUR WEEKS HOW OFTEN DID YOUR ASTHMA SYMPTOMS (WHEEZING, COUGHING, SHORTNESS OF BREATH, CHEST TIGHTNESS OR PAIN) WAKE YOU UP AT NIGHT OR EARLIER THAN USUAL IN THE MORNING: NOT AT ALL
QUESTION_5 LAST FOUR WEEKS HOW WOULD YOU RATE YOUR ASTHMA CONTROL: WELL CONTROLLED
QUESTION_2 LAST FOUR WEEKS HOW OFTEN HAVE YOU HAD SHORTNESS OF BREATH: ONCE OR TWICE A WEEK
ACT_TOTALSCORE: 21
ACT_TOTALSCORE: 21
QUESTION_4 LAST FOUR WEEKS HOW OFTEN HAVE YOU USED YOUR RESCUE INHALER OR NEBULIZER MEDICATION (SUCH AS ALBUTEROL): ONCE A WEEK OR LESS

## 2024-01-19 ENCOUNTER — OFFICE VISIT (OUTPATIENT)
Dept: FAMILY MEDICINE | Facility: CLINIC | Age: 79
End: 2024-01-19
Payer: COMMERCIAL

## 2024-01-19 VITALS
WEIGHT: 195 LBS | SYSTOLIC BLOOD PRESSURE: 134 MMHG | HEART RATE: 56 BPM | OXYGEN SATURATION: 97 % | RESPIRATION RATE: 16 BRPM | TEMPERATURE: 97.6 F | HEIGHT: 66 IN | DIASTOLIC BLOOD PRESSURE: 74 MMHG | BODY MASS INDEX: 31.34 KG/M2

## 2024-01-19 DIAGNOSIS — M47.816 LUMBAR FACET ARTHROPATHY: ICD-10-CM

## 2024-01-19 DIAGNOSIS — Z11.59 NEED FOR HEPATITIS C SCREENING TEST: ICD-10-CM

## 2024-01-19 DIAGNOSIS — M51.369 DDD (DEGENERATIVE DISC DISEASE), LUMBAR: ICD-10-CM

## 2024-01-19 DIAGNOSIS — Z01.818 PREOP GENERAL PHYSICAL EXAM: Primary | ICD-10-CM

## 2024-01-19 DIAGNOSIS — M47.816 LUMBAR SPONDYLOSIS: ICD-10-CM

## 2024-01-19 PROBLEM — Z85.038 HISTORY OF COLON CANCER: Status: ACTIVE | Noted: 2023-10-22

## 2024-01-19 LAB
ANION GAP SERPL CALCULATED.3IONS-SCNC: 7 MMOL/L (ref 7–15)
BUN SERPL-MCNC: 16.7 MG/DL (ref 8–23)
CALCIUM SERPL-MCNC: 9.4 MG/DL (ref 8.8–10.2)
CHLORIDE SERPL-SCNC: 103 MMOL/L (ref 98–107)
CREAT SERPL-MCNC: 0.88 MG/DL (ref 0.67–1.17)
DEPRECATED HCO3 PLAS-SCNC: 30 MMOL/L (ref 22–29)
EGFRCR SERPLBLD CKD-EPI 2021: 89 ML/MIN/1.73M2
GLUCOSE SERPL-MCNC: 93 MG/DL (ref 70–99)
POTASSIUM SERPL-SCNC: 4.2 MMOL/L (ref 3.4–5.3)
SODIUM SERPL-SCNC: 140 MMOL/L (ref 135–145)

## 2024-01-19 PROCEDURE — 93000 ELECTROCARDIOGRAM COMPLETE: CPT | Performed by: STUDENT IN AN ORGANIZED HEALTH CARE EDUCATION/TRAINING PROGRAM

## 2024-01-19 PROCEDURE — 80048 BASIC METABOLIC PNL TOTAL CA: CPT | Performed by: STUDENT IN AN ORGANIZED HEALTH CARE EDUCATION/TRAINING PROGRAM

## 2024-01-19 PROCEDURE — 86803 HEPATITIS C AB TEST: CPT | Performed by: STUDENT IN AN ORGANIZED HEALTH CARE EDUCATION/TRAINING PROGRAM

## 2024-01-19 PROCEDURE — 99214 OFFICE O/P EST MOD 30 MIN: CPT | Performed by: STUDENT IN AN ORGANIZED HEALTH CARE EDUCATION/TRAINING PROGRAM

## 2024-01-19 PROCEDURE — 36415 COLL VENOUS BLD VENIPUNCTURE: CPT | Performed by: STUDENT IN AN ORGANIZED HEALTH CARE EDUCATION/TRAINING PROGRAM

## 2024-01-19 ASSESSMENT — PAIN SCALES - GENERAL: PAINLEVEL: MODERATE PAIN (5)

## 2024-01-19 NOTE — PROGRESS NOTES
Preoperative Evaluation  [facility]  [address] [place] [phone]  Phone: [phone]  Fax: [phone]  Primary Provider: [facility]  Pre-op Performing Provider: CORINNE REYES  Jan 19, 2024       Peña is a 79 year old, presenting for the following:  Pre-Op Exam        1/19/2024     8:45 AM   Additional Questions   Roomed by Sammie BLISS   Accompanied by Self     Surgical Information  Surgery/Procedure: INSERTION STIMULATOR DORSAL COLUMN   Surgery [facility]  Surgeon: Dr. Link Flores  Surgery Date: 01/24/2024  Time of Surgery: 02:30pm  Where patient plans to recover: At home with family  Fax number for surgical facility: [phone]    Assessment & Plan     The proposed surgical procedure is considered INTERMEDIATE risk.    Preop general physical exam  Patient is a very pleasant 79 year old gentleman who presents today for preop evaluation prior to spinal stimulator for the below listed concerns.  See below for medications to hold, in particular holding hydrochlorothiazide, colestyramine day of surgery.  EKG is completed today and stable from previous, current and previous EKG will be sent along with this note to his surgeon.  Due to being in the hydrochlorothiazide, BMP was completed today and is stable from previous.  - EKG 12-lead complete w/read - Clinics  - Basic metabolic panel  - Basic metabolic panel    DDD (degenerative disc disease), lumbar  Lumbar spondylosis  Lumbar facet arthropathy  These are the listed diagnoses associated with the planned procedure.  Patient had good benefit from the trial period, hopeful that he will have improvement in physical activity after stimulators placed.    Need for hepatitis C screening test  Once in a lifetime screen.   - Hepatitis C Screen Reflex to HCV RNA Quant and Genotype  - Hepatitis C Screen Reflex to HCV RNA Quant and  Genotype      Possible Sleep Apnea:        1/19/2024     9:22 AM   STOP-Bang Total Score   Total Score 4   Risk Stratification 3 - 4: Moderate Risk for DALE          Risks and Recommendations  The patient has the following additional risks and recommendations for perioperative complications:   - No identified additional risk factors other than previously addressed    Antiplatelet or Anticoagulation Medication Instructions   - Patient is on no antiplatelet or anticoagulation medications.    Additional Medication Instructions  Patient is to take all scheduled medications on the day of surgery EXCEPT for modifications listed below:   - ACE/ARB: Continue without modification   - Beta Blockers: Continue taking on the day of surgery.   - Diuretics: HOLD on the day of surgery.   - Statins: Continue taking on the day of surgery.   - GI: cholestyramine hold day of surgery   - meloxicam (Mobic): HOLD 10 days before surgery.    - SSRIs, SNRIs, TCAs, Antipsychotics: Continue without modification.    - anticholinergics: Continue without modification.     Recommendation  APPROVAL GIVEN to proceed with proposed procedure pending review of diagnostic evaluation.    I spent a total of 30 minutes on the day of the visit.   Time spent by me doing chart review, history and exam, documentation and further activities per the note    Subjective       HPI related to upcoming procedure:   Stimulator placement   Had good benefit from the trial period        1/18/2024     3:48 PM   Preop Questions   1. Have you ever had a heart attack or stroke? No   2. Have you ever had surgery on your heart or blood vessels, such as a stent placement, a coronary artery bypass, or surgery on an artery in your head, neck, heart, or legs? No   3. Do you have chest pain with activity? No   4. Do you have a history of  heart failure? No   5. Do you currently have a cold, bronchitis or symptoms of other infection? No   6. Do you have a cough, shortness of breath,  "or wheezing? No   7. Do you or anyone in your family have previous history of blood clots? YES - no personal.   8. Do you or does anyone in your family have a serious bleeding problem such as prolonged bleeding following surgeries or cuts? No   9. Have you ever had problems with anemia or been told to take iron pills? No   10. Have you had any abnormal blood loss such as black, tarry or bloody stools? No   11. Have you ever had a blood transfusion? No   12. Are you willing to have a blood transfusion if it is medically needed before, during, or after your surgery? Yes   13. Have you or any of your relatives ever had problems with anesthesia? No   14. Do you have sleep apnea, excessive snoring or daytime drowsiness? YES - some days feels tired, \"has a lot to do with the pills I'm taking\"    14a. Do you have a CPAP machine? No   15. Do you have any artifical heart valves or other implanted medical devices like a pacemaker, defibrillator, or continuous glucose monitor? No   16. Do you have artificial joints? YES - right hip, left hip, left knee, fused ankle   17. Are you allergic to latex? No       Health Care Directive  Patient does not have a Health Care Directive or Living Will: Advance Directive received and scanned. Click on Code in the patient header to view.    Preoperative Review of    reviewed - controlled substances reflected in medication list.    Status of Chronic Conditions:  HYPERTENSION - Patient has longstanding history of HTN , currently denies any symptoms referable to elevated blood pressure. Specifically denies chest pain, palpitations, dyspnea, orthopnea, PND or peripheral edema. Blood pressure readings have been in normal range. Current medication regimen is as listed below. Patient denies any side effects of medication.     Patient Active Problem List    Diagnosis Date Noted    Mild intermittent asthma without complication 01/03/2024     Priority: Medium     Likely more copd picture       " Tremor 01/03/2024     Priority: Medium     Improved on primidone.  Diarrhea side effect?        Abnormal CT lung screening 11/27/2023     Priority: Medium     Currently managed with follow up imaging by [facility] Results Team.        Chronic diarrhea 10/22/2023     Priority: Medium     After colon resection.        Chronic midline low back pain without sciatica 10/22/2023     Priority: Medium    History of colon cancer 10/22/2023     Priority: Medium    HTN (hypertension) 10/22/2023     Priority: Medium    Hyperlipidemia LDL goal <130 10/22/2023     Priority: Medium    BPH (benign prostatic hyperplasia) 10/22/2023     Priority: Medium      Past Medical History:   Diagnosis Date    Arthritis     BPH (benign prostatic hyperplasia)     Chronic diarrhea 10/22/2023    Chronic midline low back pain without sciatica 10/22/2023    Colon cancer (H) 2000    COPD (chronic obstructive pulmonary disease) (H)     HTN (hypertension)     Hyperlipidemia LDL goal <130      Past Surgical History:   Procedure Laterality Date    ABDOMEN SURGERY  2000    APPENDECTOMY  1959    BOWEL RESECTION  2000    CHOLECYSTECTOMY  2018    COLONOSCOPY N/A 08/18/2023    Procedure: COLONOSCOPY, WITH POLYPECTOMY AND BIOPSY;  Surgeon: Marli Lyons MD;  Location: [facility]    COLONOSCOPY N/A 08/18/2023    Procedure: COLONOSCOPY, FLEXIBLE, WITH LESION REMOVAL USING SNARE;  Surgeon: Marli Lyons MD;  Location: [facility]    EYE SURGERY  2018    ORTHOPEDIC SURGERY  2012 2013 2012 2015     Current Outpatient Medications   Medication Sig Dispense Refill    albuterol (PROAIR HFA/PROVENTIL HFA/VENTOLIN HFA) 108 (90 Base) MCG/ACT inhaler Inhale 2 puff using inhaler every four hours as needed for wheezing. pretreat before activity 18 g 1    atorvastatin (LIPITOR) 20 MG tablet Take 1 tablet (20 mg) by mouth every evening 90 tablet 3    cholecalciferol (VITAMIN D3) 125 mcg (5000 units) capsule Take 125 mcg by mouth daily      cholestyramine (QUESTRAN) 4 g  "packet Take 1 packet (4 g) by mouth 2 times daily (with meals) 60 packet 11    finasteride (PROSCAR) 5 MG tablet Take 1 tablet (5 mg) by mouth daily 90 tablet 3    gabapentin (NEURONTIN) 300 MG capsule Take 1 capsule (300 mg) by mouth 3 times daily 270 capsule 3    hydrochlorothiazide (HYDRODIURIL) 25 MG tablet Take 1 tablet (25 mg) by mouth daily 90 tablet 3    lisinopril (ZESTRIL) 40 MG tablet Take 1 tablet (40 mg) by mouth daily 90 tablet 3    primidone (MYSOLINE) 250 MG tablet Take 2 tablets (500 mg) by mouth every evening 180 tablet 3    propranolol ER (INDERAL LA) 60 MG 24 hr capsule Take 1 capsule (60 mg) by mouth daily 90 capsule 3    tamsulosin (FLOMAX) 0.4 MG capsule Take 2 capsules (0.8 mg) by mouth daily 180 capsule 3       No Known Allergies     Social History     Tobacco Use    Smoking status: Former     Packs/day: 1.00     Years: 48.00     Additional pack years: 0.00     Total pack years: 48.00     Types: Cigarettes     Start date: 1970     Quit date: 2016     Years since quittin.8    Smokeless tobacco: Never   Substance Use Topics    Alcohol use: Yes     Comment: rare     Family History   Problem Relation Age of Onset    Diabetes Sister     Colon Cancer Other         Aunt     History   Drug Use Unknown         Review of Systems    Review of Systems  Constitutional, HEENT, cardiovascular, pulmonary, gi and gu systems are negative, except as otherwise noted.  Chronic leg pain on the right.   Right hand numbness/weakness - carpal tunnel     Objective    /74 (BP Location: Right arm, Patient Position: Sitting, Cuff Size: Adult Regular)   Pulse 56   Temp 97.6  F (36.4  C) (Tympanic)   Resp 16   Ht 1.664 m (5' 5.5\")   Wt 88.5 kg (195 lb)   SpO2 97%   BMI 31.96 kg/m     Estimated body mass index is 31.96 kg/m  as calculated from the following:    Height as of this encounter: 1.664 m (5' 5.5\").    Weight as of this encounter: 88.5 kg (195 lb).  Physical Exam  GENERAL: alert and no " distress  EYES: Eyes grossly normal to inspection, and conjunctivae and sclerae normal  HENT: ear canals and TM's normal, nose and mouth without ulcers or lesions  NECK: no adenopathy, no asymmetry, masses, or scars  RESP: lungs clear to auscultation - no rales, rhonchi or wheezes  CV: regular rate and rhythm, normal S1 S2, no S3 or S4, no murmur, click or rub, no peripheral edema  ABDOMEN: soft, nontender, no hepatosplenomegaly, no masses and bowel sounds normal  MS: no gross musculoskeletal defects noted, no edema. Antalgic gait  SKIN: no suspicious lesions or rashes  NEURO: Normal strength and tone, mentation intact and speech normal  PSYCH: mentation appears normal, affect normal/bright  LYMPH: no cervical, supraclavicular, axillary, or inguinal adenopathy    Recent Labs   Lab Test 10/23/23  0649 10/22/23  1521 10/22/23  0553   HGB 13.7  --  12.4*     --  160     --  130*   POTASSIUM 3.6 3.8 3.2*   CR 0.91  --  1.24*        Diagnostics  Labs pending at this time.  Results will be reviewed when available.  Recent Results (from the past 24 hour(s))   Basic metabolic panel    Collection Time: 01/19/24 10:05 AM   Result Value Ref Range    Sodium 140 135 - 145 mmol/L    Potassium 4.2 3.4 - 5.3 mmol/L    Chloride 103 98 - 107 mmol/L    Carbon Dioxide (CO2) 30 (H) 22 - 29 mmol/L    Anion Gap 7 7 - 15 mmol/L    Urea Nitrogen 16.7 8.0 - 23.0 mg/dL    Creatinine 0.88 0.67 - 1.17 mg/dL    GFR Estimate 89 >60 mL/min/1.73m2    Calcium 9.4 8.8 - 10.2 mg/dL    Glucose 93 70 - 99 mg/dL      EKG required for request from surgeon and not completed in the last 90 days.     Revised Cardiac Risk Index (RCRI)  The patient has the following serious cardiovascular risks for perioperative complications:   - No serious cardiac risks = 0 points     RCRI Interpretation: 0 points: Class I (very low risk - 0.4% complication rate)         Signed Electronically by: Sheeba Whitt MD  Copy of this evaluation report is provided  to requesting physician.

## 2024-01-19 NOTE — PATIENT INSTRUCTIONS
Preparing for Your Surgery  Getting started  A nurse will call you to review your health history and instructions. They will give you an arrival time based on your scheduled surgery time. Please be ready to share:  Your doctor's clinic name and phone number  Your medical, surgical, and anesthesia history  A list of allergies and sensitivities  A list of medicines, including herbal treatments and over-the-counter drugs  Whether the patient has a legal guardian (ask how to send us the papers in advance)  Please tell us if you're pregnant--or if there's any chance you might be pregnant. Some surgeries may injure a fetus (unborn baby), so they require a pregnancy test. Surgeries that are safe for a fetus don't always need a test, and you can choose whether to have one.   If you have a child who's having surgery, please ask for a copy of Preparing for Your Child's Surgery.    Preparing for surgery  Within 10 to 30 days of surgery: Have a pre-op exam (sometimes called an H&P, or History and Physical). This can be done at a clinic or pre-operative center.  If you're having a , you may not need this exam. Talk to your care team.  At your pre-op exam, talk to your care team about all medicines you take. If you need to stop any medicines before surgery, ask when to start taking them again.  We do this for your safety. Many medicines can make you bleed too much during surgery. Some change how well surgery (anesthesia) drugs work.  Call your insurance company to let them know you're having surgery. (If you don't have insurance, call [phone].)  Call your clinic if there's any change in your health. This includes signs of a cold or flu (sore throat, runny nose, cough, rash, fever). It also includes a scrape or scratch near the surgery site.  If you have questions on the day of surgery, call your hospital or surgery center.  Eating and drinking guidelines  For your safety: Unless your surgeon tells you otherwise,  follow the guidelines below.  Eat and drink as usual until 8 hours before you arrive for surgery. After that, no food or milk.  Drink clear liquids until 2 hours before you arrive. These are liquids you can see through, like water, Gatorade, and Propel Water. They also include plain black coffee and tea (no cream or milk), candy, and breath mints. You can spit out gum when you arrive.  If you drink alcohol: Stop drinking it the night before surgery.  If your care team tells you to take medicine on the morning of surgery, it's okay to take it with a sip of water.  Preventing infection  Shower or bathe the night before and morning of your surgery. Follow the instructions your clinic gave you. (If no instructions, use regular soap.)  Don't shave or clip hair near your surgery site. We'll remove the hair if needed.  Don't smoke or vape the morning of surgery. You may chew nicotine gum up to 2 hours before surgery. A nicotine patch is okay.  Note: Some surgeries require you to completely quit smoking and nicotine. Check with your surgeon.  Your care team will make every effort to keep you safe from infection. We will:  Clean our hands often with soap and water (or an alcohol-based hand rub).  Clean the skin at your surgery site with a special soap that kills germs.  Give you a special gown to keep you warm. (Cold raises the risk of infection.)  Wear special hair covers, masks, gowns and gloves during surgery.  Give antibiotic medicine, if prescribed. Not all surgeries need antibiotics.  What to bring on the day of surgery  Photo ID and insurance card  Copy of your health care directive, if you have one  Glasses and hearing aids (bring cases)  You can't wear contacts during surgery  Inhaler and eye drops, if you use them (tell us about these when you arrive)  CPAP machine or breathing device, if you use them  A few personal items, if spending the night  If you have . . .  A pacemaker, ICD (cardiac defibrillator) or other  implant: Bring the ID card.  An implanted stimulator: Bring the remote control.  A legal guardian: Bring a copy of the certified (court-stamped) guardianship papers.  Please remove any jewelry, including body piercings. Leave jewelry and other valuables at home.  If you're going home the day of surgery  You must have a responsible adult drive you home. They should stay with you overnight as well.  If you don't have someone to stay with you, and you aren't safe to go home alone, we may keep you overnight. Insurance often won't pay for this.  After surgery  If it's hard to control your pain or you need more pain medicine, please call your surgeon's office.  Questions?   If you have any questions for your care team, list them here: _________________________________________________________________________________________________________________________________________________________________________ ____________________________________ ____________________________________ ____________________________________  For informational purposes only. Not to replace the advice of your health care provider. Copyright   2003, 2019 [facility] [facility]. All rights reserved. Clinically reviewed by Kinza Ogden MD. [employer] 210215 - REV 12/22.    How to Take Your Medication Before Surgery  - Take all of your medications before surgery except as noted below  - HOLD (do not take) your HYDROCHLOROTHIAZIDE on the morning of surgery.   - HOLD (do not take) cholestyramine  - HOLD metamucil morning of surgery. You can do 1/2 dosage the morning of.

## 2024-01-20 LAB — HCV AB SERPL QL IA: NONREACTIVE

## 2024-01-23 ENCOUNTER — DOCUMENTATION ONLY (OUTPATIENT)
Dept: OTHER | Facility: CLINIC | Age: 79
End: 2024-01-23
Payer: COMMERCIAL

## 2024-02-26 ENCOUNTER — OFFICE VISIT (OUTPATIENT)
Dept: FAMILY MEDICINE | Facility: CLINIC | Age: 79
End: 2024-02-26
Payer: COMMERCIAL

## 2024-02-26 VITALS
OXYGEN SATURATION: 97 % | RESPIRATION RATE: 16 BRPM | HEIGHT: 66 IN | HEART RATE: 60 BPM | BODY MASS INDEX: 31.82 KG/M2 | WEIGHT: 198 LBS | SYSTOLIC BLOOD PRESSURE: 118 MMHG | TEMPERATURE: 97.5 F | DIASTOLIC BLOOD PRESSURE: 70 MMHG

## 2024-02-26 DIAGNOSIS — R25.1 TREMOR: ICD-10-CM

## 2024-02-26 DIAGNOSIS — K52.9 CHRONIC DIARRHEA: Primary | Chronic | ICD-10-CM

## 2024-02-26 DIAGNOSIS — I10 HYPERTENSION, UNSPECIFIED TYPE: ICD-10-CM

## 2024-02-26 PROCEDURE — 99214 OFFICE O/P EST MOD 30 MIN: CPT | Performed by: STUDENT IN AN ORGANIZED HEALTH CARE EDUCATION/TRAINING PROGRAM

## 2024-02-26 RX ORDER — PROPRANOLOL HYDROCHLORIDE 20 MG/1
TABLET ORAL
Qty: 45 TABLET | Refills: 0 | Status: SHIPPED | OUTPATIENT
Start: 2024-02-26 | End: 2024-03-25

## 2024-02-26 RX ORDER — CHOLESTYRAMINE 4 G/9G
1-2 POWDER, FOR SUSPENSION ORAL 2 TIMES DAILY WITH MEALS
Qty: 180 PACKET | Refills: 11 | Status: SHIPPED | OUTPATIENT
Start: 2024-02-26 | End: 2024-03-25

## 2024-02-26 ASSESSMENT — PAIN SCALES - GENERAL: PAINLEVEL: MODERATE PAIN (5)

## 2024-02-26 NOTE — PATIENT INSTRUCTIONS
Increase the Questran to 2 packets twice daily for at least 4 days. If this change fixes the diarrhea problem, but blood pressure remains lower than goal of at least 130-145/70-90, then continue Questran, call me and we will adjust a different blood pressure medicine.   If that isn't helping enough, then start the propranolol taper off  40 mg twice daily for 6 days  20 mg twice daily for 6 days  10 mg twice daily for 6 days  10 mg daily for 6 days, then discontinue  If the tremor worsens, or your blood pressure increases consistently over 140/90, then stick with the current dose of propranolol and call me or message me for a plan change  If you're still having diarrhea, off the propranolol and blood pressure is okay, and tremor isn't different, then we can start slowly decreasing on the primidone.       If you're feeling dizzy, check blood pressure.    [facility]  Progress Note   Nursery    Patient Name: Naga Mederos  MRN: [id number]  Admission Date: 2020      Subjective:     Stable, no events noted overnight.    Feeding: Breastmilk and supplementing with formula per parental preference   Infant is voiding and stooling.    Objective:     Vital Signs (Most Recent)  Temp: 98.3 °F (36.8 °C) (20 0900)  Pulse: 124 (20 0900)  Resp: 60 (20)    Most Recent Weight: 3850 g (8 lb 7.8 oz) (20)  Percent Weight Change Since Birth: -4.4     Physical Exam   Constitutional: She appears well-nourished. No distress.   HENT:   Head: Anterior fontanelle is flat. No cranial deformity or facial anomaly.   Mouth/Throat: Mucous membranes are moist.   Eyes: Right eye exhibits no discharge. Left eye exhibits no discharge.   Cardiovascular: Normal rate and regular rhythm.   Pulmonary/Chest: Effort normal and breath sounds normal. No respiratory distress.   Abdominal: Soft. Bowel sounds are normal. She exhibits no distension.   Genitourinary:   Genitourinary Comments: No diaper rash   Musculoskeletal: Normal range of motion.   Neurological: She is alert.   Skin: No jaundice.       Labs:  Recent Results (from the past 24 hour(s))   Bilirubin, Total,     Collection Time: 20  6:05 PM   Result Value Ref Range    Bilirubin, Total -  5.7 0.1 - 6.0 mg/dL       Assessment and Plan:     39w5d  , doing well. Continue routine  care.    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  Her son was born here also and he did fine until day 3-4 when he developed withdrawal syndrome and had to be transferred to NICU.  Mom and dad are both aware of the screening for SERENITY.  Overnight slight increased scores 5 and 6 - improved a bit today.  UDS on baby is negative  Meconium tox sent  Social work to consult - no intervention needed see note  Plan to keep baby 96 hours     Single  liveborn infant  Special  care  Baby is not LGA - she is 89th% - we did check glucose x 12 hours to assure any abnl symptoms which may have been detected on SERENITY were not due to hypoglycemia - she has been euglycemic and we have stopped checking  Mom is breast and bottle feeding per parental choice    24 hour bili = 5.7 low risk        Nyasia Livingston MD  Pediatrics  [facility]

## 2024-02-26 NOTE — PROGRESS NOTES
Assessment & Plan     Patient is a very pleasant 79 year old gentleman who presents today with is daughter for evaluation of chronic diarrhea and dizziness.     Chronic diarrhea  Patient has been having chronic diarrhea since diarrhea since bowel surgery and cholecystectomy. This worsened with starting primidone for tremor. He has also been experiencing some dizziness, and I'm concerned about polypharmacy, possible mild dehydration and hypotension. For diarrhea, we discussed multiple options for management. We will start with increasing cholestyramine for symptomatic relief to see if this improves symptoms. Then adjust tremor medications as below.   - cholestyramine (QUESTRAN) 4 g packet  Dispense: 180 packet; Refill: 11  - PRIMARY CARE FOLLOW-UP SCHEDULING    Hypertension, unspecified type  For the concern with hypotension as part of the presentation, we will preferentially decrease propranolol and monitor BP/HR  - propranolol (INDERAL) 20 MG tablet  Dispense: 45 tablet; Refill: 0  - PRIMARY CARE FOLLOW-UP SCHEDULING    Tremor  Will taper propranolol first and monitor for worsening of tremor. Will monitor blood pressure during this process. Consider decreasing primidone pending response to above plan. Follow up in 1 month, message sooner with side effects/blood pressure or heart rate issues.     I spent a total of 35 minutes on the day of the visit.   Time spent by me doing chart review, history and exam, documentation and further activities per the note      Subjective   Peña is a 79 year old, presenting for the following health issues:  Establish Care and Tremors        2/26/2024     1:47 PM   Additional Questions   Roomed by Sammie BLISS   Accompanied by Jessi-daughter     History of Present Illness       Reason for visit:  Discuss Medications.    He eats 0-1 servings of fruits and vegetables daily.He consumes 1 sweetened beverage(s) daily.He exercises with enough effort to increase his heart rate 10 to 19  "minutes per day.  He exercises with enough effort to increase his heart rate 6 days per week.   He is taking medications regularly.       Medication Followup of gabapentin, primidone, propanolol er  Taking Medication as prescribed: yes  Side Effects:  diarrhea, drowsy, balance off when standing  Medication Helping Symptoms:  yes    Diarrhea chronically since cholecystectomy  Patient also with significant increase diarrhea since starting primidone.   Primidone is helping with tremor, though.   Multiple episodes daily    Propranolol maybe did help a little, then started gabapentin, most recently started primidone and decreased melisa dosage last fall.     Feeling drowsiness/sleepy in the evenings, sometimes dizzy when standing up to walk, especially overnight. Feels like he wants to fall back.     Blood pressures at home around 128/62, 130-145/<90      Review of Systems  Constitutional, HEENT, cardiovascular, pulmonary, gi and gu systems are negative, except as otherwise noted.      Objective    /70 (BP Location: Right arm, Patient Position: Sitting, Cuff Size: Adult Regular)   Pulse 60   Temp 97.5  F (36.4  C) (Tympanic)   Resp 16   Ht 1.664 m (5' 5.5\")   Wt 89.8 kg (198 lb)   SpO2 97%   BMI 32.45 kg/m    Body mass index is 32.45 kg/m .  Physical Exam  Constitutional:       Appearance: Normal appearance.   HENT:      Head: Normocephalic.   Eyes:      General: No scleral icterus.     Extraocular Movements: Extraocular movements intact.      Conjunctiva/sclera: Conjunctivae normal.   Cardiovascular:      Rate and Rhythm: Normal rate.      Heart sounds: Normal heart sounds.   Pulmonary:      Effort: Pulmonary effort is normal.   Neurological:      General: No focal deficit present.      Mental Status: He is alert and oriented to person, place, and time.           Results from this visitNo results found for any visits on 02/26/24.          Signed Electronically by: Sheeba Whitt MD    "

## 2024-02-29 ENCOUNTER — TELEPHONE (OUTPATIENT)
Dept: FAMILY MEDICINE | Facility: CLINIC | Age: 79
End: 2024-02-29
Payer: COMMERCIAL

## 2024-02-29 NOTE — TELEPHONE ENCOUNTER
S-(situation): C/O persistent, worsening dizziness, weakness despite lowered propanolol dose.    B-(background): 2/26 OV addressing diarrhea, dizziness. Cholestyramine increased, propanolol decreased with taper instructions. Currently taking propanolol 20 mg tab 2 (40 mg) BID. Also takes lisinopril 40 mg daily, hydrochlorothiazide 25 mg daily. BP this /44, recheck this afternoon 107/63. HR 59-60.  BP Readings from Last 6 Encounters:   02/26/24 118/70   01/19/24 134/74   01/03/24 130/80   11/06/23 (!) 156/74   10/23/23 138/63   08/18/23 129/70    Reports increased dizziness, weakness. Notes increased dizziness with position changes, needed to hold on to furniture/ counter when making bkfst this AM. States fluid intake, urine output adequate, no improvement in diarrhea.     A-(assessment): Increased dizziness, weakness, low BP, persistent diarrhea.     R-(recommendations): Forwarded to Dr. Whitt for review, recommendations.   NICOLASA Lei RN

## 2024-02-29 NOTE — TELEPHONE ENCOUNTER
Spoke with patient relayed Dr. Sheeba Whitt's detailed message below and patient verbalized good understanding states he will cut the pill in 1/2 if having any difficulties will call care team back.    Julie Behrendt RN

## 2024-02-29 NOTE — TELEPHONE ENCOUNTER
While we're working on adjusting medications, lets have him decrease hydrochlorothiazide to 1/2 tablet daily. Continue to monitor pressures.     Sheeba Whitt MD

## 2024-03-25 ENCOUNTER — OFFICE VISIT (OUTPATIENT)
Dept: FAMILY MEDICINE | Facility: CLINIC | Age: 79
End: 2024-03-25
Attending: STUDENT IN AN ORGANIZED HEALTH CARE EDUCATION/TRAINING PROGRAM
Payer: COMMERCIAL

## 2024-03-25 VITALS
BODY MASS INDEX: 31.82 KG/M2 | SYSTOLIC BLOOD PRESSURE: 130 MMHG | HEART RATE: 60 BPM | OXYGEN SATURATION: 96 % | HEIGHT: 66 IN | WEIGHT: 198 LBS | RESPIRATION RATE: 18 BRPM | DIASTOLIC BLOOD PRESSURE: 74 MMHG | TEMPERATURE: 97.9 F

## 2024-03-25 DIAGNOSIS — M47.816 LUMBAR FACET ARTHROPATHY: ICD-10-CM

## 2024-03-25 DIAGNOSIS — K52.9 CHRONIC DIARRHEA: Primary | Chronic | ICD-10-CM

## 2024-03-25 DIAGNOSIS — G89.29 CHRONIC MIDLINE LOW BACK PAIN WITHOUT SCIATICA: Chronic | ICD-10-CM

## 2024-03-25 DIAGNOSIS — M54.50 CHRONIC MIDLINE LOW BACK PAIN WITHOUT SCIATICA: Chronic | ICD-10-CM

## 2024-03-25 DIAGNOSIS — R25.1 TREMOR: ICD-10-CM

## 2024-03-25 DIAGNOSIS — I10 HYPERTENSION, UNSPECIFIED TYPE: ICD-10-CM

## 2024-03-25 DIAGNOSIS — M51.369 DDD (DEGENERATIVE DISC DISEASE), LUMBAR: ICD-10-CM

## 2024-03-25 DIAGNOSIS — M47.816 LUMBAR SPONDYLOSIS: ICD-10-CM

## 2024-03-25 PROBLEM — M43.06 LUMBAR SPONDYLOLYSIS: Status: ACTIVE | Noted: 2023-12-22

## 2024-03-25 PROCEDURE — 99214 OFFICE O/P EST MOD 30 MIN: CPT | Performed by: STUDENT IN AN ORGANIZED HEALTH CARE EDUCATION/TRAINING PROGRAM

## 2024-03-25 RX ORDER — CHOLESTYRAMINE 4 G/9G
2 POWDER, FOR SUSPENSION ORAL 2 TIMES DAILY WITH MEALS
Qty: 360 PACKET | Refills: 3 | Status: SHIPPED | OUTPATIENT
Start: 2024-03-25

## 2024-03-25 RX ORDER — GABAPENTIN 100 MG/1
100 CAPSULE ORAL DAILY
Qty: 90 CAPSULE | Refills: 3 | Status: SHIPPED | OUTPATIENT
Start: 2024-03-25

## 2024-03-25 RX ORDER — PROPRANOLOL HYDROCHLORIDE 20 MG/1
TABLET ORAL
Qty: 60 TABLET | Refills: 0 | Status: SHIPPED | OUTPATIENT
Start: 2024-03-25 | End: 2024-04-22 | Stop reason: DRUGHIGH

## 2024-03-25 RX ORDER — MELOXICAM 7.5 MG/1
7.5 TABLET ORAL DAILY
Qty: 90 TABLET | Refills: 3 | Status: SHIPPED | OUTPATIENT
Start: 2024-03-25 | End: 2024-04-22

## 2024-03-25 ASSESSMENT — PAIN SCALES - GENERAL: PAINLEVEL: SEVERE PAIN (7)

## 2024-03-25 NOTE — PROGRESS NOTES
Assessment & Plan     Patient is a very pleasant 79 year old who presents today for recheck from 1 month ago after adjustment to propranolol dosing. He continued to have low pressures, resulting in decrease hydrochlorothiazide dosage to 12.5 mg in the interim.     Chronic diarrhea  For chronic diarrhea, he is doing well on the cholestyramine. Refill x1 year sent.   - PRIMARY CARE FOLLOW-UP SCHEDULING  - cholestyramine (QUESTRAN) 4 g packet  Dispense: 360 packet; Refill: 3    Tremor  Hypertension, unspecified type  For tremor and hypertension, patient's tremor has returned now off of propranolol. Did well at 60 mg daily of propranolol. However, was having hypotension. Blood pressure review today shows that BP at home have primarily been in the 120-130's on no propranolol and 12.5 mg hydrochlorothiazide. We opted to restart propranolol for tremor, discontinue hydrochlorothiazide, monitor for leg swelling. If leg swelling occurs, plan to restart low dose hydrochlorothiazide, drop lisinopril dosage. Use lowest effective dose of propranolol.   - PRIMARY CARE FOLLOW-UP SCHEDULING  - propranolol (INDERAL) 20 MG tablet  Dispense: 60 tablet; Refill: 0  - PRIMARY CARE FOLLOW-UP SCHEDULING    DDD (degenerative disc disease), lumbar  Lumbar facet arthropathy  Lumbar spondylosis  Patient has longstanding low back pain. He has done well on meloxicam. Was stopped on this with increase in pain. Did do a trial of about 1 week on 15 mg of meloxicam independently recently with good results. We discussed long term risk of kidney disease and gastric ulcer. We will trial 7.5 mg meloxicam first. If not improving pain enough, go to 1.5 tablets daily. Ultimately, increase to 15 mg and monitor renal function and GI symptoms if needed.   - meloxicam (MOBIC) 7.5 MG tablet  Dispense: 90 tablet; Refill: 3  - PRIMARY CARE FOLLOW-UP SCHEDULING    Chronic midline low back pain without sciatica  Has had dizziness and balance issues on the higher  "dose of gabapentin. [place] the 100 mg worked well for him. We opted to drop back to 100 mg.   - gabapentin (NEURONTIN) 100 MG capsule  Dispense: 90 capsule; Refill: 3  - PRIMARY CARE FOLLOW-UP SCHEDULING      Follow up 1 month.    I spent a total of 30 minutes on the day of the visit.   Time spent by me doing chart review, history and exam, documentation and further activities per the note      Subjective   Peña is a 79 year old, presenting for the following health issues:  Recheck Medication and Hypertension        3/25/2024    10:16 AM   Additional Questions   Roomed by Sammie BLISS   Accompanied by Self     History of Present Illness       Reason for visit:  Follow upHe consumes 0 sweetened beverage(s) daily.He exercises with enough effort to increase his heart rate 10 to 19 minutes per day.  He exercises with enough effort to increase his heart rate 6 days per week.   He is taking medications regularly.       Medication Followup of all medications  Taking Medication as prescribed: yes  Side Effects:  tremor back  Medication Helping Symptoms:  NO    Hypertension Follow-up  Do you check your blood pressure regularly outside of the clinic? Yes   Are you following a low salt diet? No  Are your blood pressures ever more than 140 on the top number (systolic) OR more   than 90 on the bottom number (diastolic), for example 140/90? Yes      Review of Systems  Constitutional, HEENT, cardiovascular, pulmonary, gi and gu systems are negative, except as otherwise noted.      Objective    /74 (BP Location: Right arm, Patient Position: Sitting, Cuff Size: Adult Regular)   Pulse 60   Temp 97.9  F (36.6  C) (Tympanic)   Resp 18   Ht 1.664 m (5' 5.5\")   Wt 89.8 kg (198 lb)   SpO2 96%   BMI 32.45 kg/m    Body mass index is 32.45 kg/m .  Physical Exam  Constitutional:       Appearance: Normal appearance.   HENT:      Head: Normocephalic.   Eyes:      General: No scleral icterus.     Extraocular Movements: Extraocular " movements intact.      Conjunctiva/sclera: Conjunctivae normal.   Cardiovascular:      Rate and Rhythm: Normal rate.   Pulmonary:      Effort: Pulmonary effort is normal.   Musculoskeletal:         General: Normal range of motion.      Cervical back: Normal range of motion.   Neurological:      Mental Status: He is alert and oriented to person, place, and time.      Motor: Tremor present.                  Signed Electronically by: Sheeba Whitt MD

## 2024-03-25 NOTE — PATIENT INSTRUCTIONS
Blood Pressure Log    For the tremor:   Restart the propranolol. Every 10 days, increase to the next dosage. Monitor the tremor. If the tremor is good, stick with the dosage you're on - you don't need to increase all the way to 60 mg.   Continue primidone  For the diarrhea - continue at 2 packets twice daily of the cholestyramine  For blood pressure:   Monitor blood pressures again while we're adjusting medication. Message me or call me if you're having lightheadedness and dizziness, or if your blood pressures are consistently less than 110 for the upper number or less than 60 for the lower number, or if you're consistently above 140/90 often.   Stop the hydrochlorothiazide and we will just plan to increase the propranolol  Watch for leg swelling - let me know you start getting swelling and  we may have to adjust the hydrochlorothiazide and lisinopril dosage.   For pain -   we will try restarting at 7.5 mg meloxicam. If in 1 week this isn't enough, then message me and we will plan to adjust the dosage (likely will go to 1.5 tablets first for another week before increasing all the way to 15 mg, but communicate with me before making this change on your own)  Decrease back to 100 mg once daily of gabapentin

## 2024-04-04 ENCOUNTER — MYC MEDICAL ADVICE (OUTPATIENT)
Dept: FAMILY MEDICINE | Facility: CLINIC | Age: 79
End: 2024-04-04
Payer: COMMERCIAL

## 2024-04-22 ENCOUNTER — OFFICE VISIT (OUTPATIENT)
Dept: FAMILY MEDICINE | Facility: CLINIC | Age: 79
End: 2024-04-22
Attending: STUDENT IN AN ORGANIZED HEALTH CARE EDUCATION/TRAINING PROGRAM
Payer: COMMERCIAL

## 2024-04-22 VITALS
OXYGEN SATURATION: 96 % | SYSTOLIC BLOOD PRESSURE: 180 MMHG | DIASTOLIC BLOOD PRESSURE: 90 MMHG | RESPIRATION RATE: 16 BRPM | HEIGHT: 66 IN | BODY MASS INDEX: 32.14 KG/M2 | HEART RATE: 52 BPM | WEIGHT: 200 LBS

## 2024-04-22 DIAGNOSIS — M51.369 DDD (DEGENERATIVE DISC DISEASE), LUMBAR: ICD-10-CM

## 2024-04-22 DIAGNOSIS — R25.1 TREMOR: ICD-10-CM

## 2024-04-22 DIAGNOSIS — M47.816 LUMBAR SPONDYLOSIS: ICD-10-CM

## 2024-04-22 DIAGNOSIS — I10 HYPERTENSION, UNSPECIFIED TYPE: Primary | ICD-10-CM

## 2024-04-22 DIAGNOSIS — M47.816 LUMBAR FACET ARTHROPATHY: ICD-10-CM

## 2024-04-22 PROCEDURE — 99214 OFFICE O/P EST MOD 30 MIN: CPT | Performed by: STUDENT IN AN ORGANIZED HEALTH CARE EDUCATION/TRAINING PROGRAM

## 2024-04-22 RX ORDER — PROPRANOLOL HCL 60 MG
60 CAPSULE, EXTENDED RELEASE 24HR ORAL DAILY
Qty: 90 CAPSULE | Refills: 3 | Status: SHIPPED | OUTPATIENT
Start: 2024-04-22

## 2024-04-22 RX ORDER — MELOXICAM 7.5 MG/1
11.25 TABLET ORAL DAILY
Qty: 135 TABLET | Refills: 3 | Status: SHIPPED | OUTPATIENT
Start: 2024-04-22

## 2024-04-22 ASSESSMENT — PAIN SCALES - GENERAL: PAINLEVEL: MILD PAIN (3)

## 2024-04-22 NOTE — PROGRESS NOTES
Assessment & Plan     Patient is a very pleasant 79 year old who presents today for follow-up regarding blood pressure and tremor.  Patient's blood pressure at home has been typically in the 130s to 150s systolic over 60s to 80s diastolic.  He does note that he has had some blood pressures that are higher, such as the 1 he had today in clinic, however after resting for longer, he had improvement down into the 130s to 150s.  This is much higher than it was when we started discontinuing medications.    Hypertension, unspecified type  Level of hypertension and trauma, plan to increase back to 60 mg of extended release propranolol.  He will continue to monitor blood pressures over the next few weeks.  If he is continuing to have elevated blood pressures, will notify me, we will plan to add back to 5 mg of hydrochlorothiazide.  As his stools have significantly improved, suspect that his fluid status is also improved, which may be contributing to the increase in blood pressures.  - PRIMARY CARE FOLLOW-UP SCHEDULING  - propranolol ER (INDERAL LA) 60 MG 24 hr capsule  Dispense: 90 capsule; Refill: 3    Tremor  As noted above we will increase back to the 60 mg of propranolol.  Continue primidone.  - propranolol ER (INDERAL LA) 60 MG 24 hr capsule  Dispense: 90 capsule; Refill: 3    DDD (degenerative disc disease), lumbar  Lumbar facet arthropathy  Lumbar spondylosis  We had a long discussion at previous visit regarding meloxicam usage in the context of kidney concerns.  Unfortunately, patient did not have good control with just 7.5 mg of meloxicam, but has been taking 1-1/2 tablets of the 7.5 mg tablets, symptoms seem well-controlled.  Continue at that dosage, try to avoid increasing back to 15 mg if possible.  - PRIMARY CARE FOLLOW-UP SCHEDULING  - meloxicam (MOBIC) 7.5 MG tablet  Dispense: 135 tablet; Refill: 3      I spent a total of 23 minutes on the day of the visit.   Time spent by me doing chart review, history and  "exam, documentation and further activities per the note      Subjective   Peña is a 79 year old, presenting for the following health issues:  Hypertension and Tremors        4/22/2024     8:52 AM   Additional Questions   Roomed by Sammie BLISS   Accompanied by Daughter Jessi     History of Present Illness       Hypertension: He presents for follow up of hypertension.  He does check blood pressure  regularly outside of the clinic. Outside blood pressures have been over 140/90. He does not follow a low salt diet.     Reason for visit:  Review medications    He eats 2-3 servings of fruits and vegetables daily.He consumes 1 sweetened beverage(s) daily.He exercises with enough effort to increase his heart rate 10 to 19 minutes per day.  He exercises with enough effort to increase his heart rate 5 days per week.   He is taking medications regularly.       Medication Followup of propranolol, gabapentin, primodone  Taking Medication as prescribed: yes  Side Effects:  None  Medication Helping Symptoms:  NO      Tremor remains, thinks he needs to go back up to 60 of propranolol          Review of Systems  Constitutional, HEENT, cardiovascular, pulmonary, gi and gu systems are negative, except as otherwise noted.      Objective    BP (!) 180/90 (BP Location: Right arm, Patient Position: Sitting, Cuff Size: Adult Regular)   Pulse 52   Resp 16   Ht 1.664 m (5' 5.5\")   Wt 90.7 kg (200 lb)   SpO2 96%   BMI 32.78 kg/m    Body mass index is 32.78 kg/m .  Physical Exam  Constitutional:       Appearance: Normal appearance.   HENT:      Head: Normocephalic.   Eyes:      General: No scleral icterus.     Extraocular Movements: Extraocular movements intact.      Conjunctiva/sclera: Conjunctivae normal.   Cardiovascular:      Rate and Rhythm: Normal rate.   Pulmonary:      Effort: Pulmonary effort is normal.   Neurological:      General: No focal deficit present.      Mental Status: He is alert and oriented to person, place, and " time.           Results from this visitNo results found for any visits on 04/22/24.          Signed Electronically by: Sheeba Whitt MD

## 2024-05-01 ENCOUNTER — MYC MEDICAL ADVICE (OUTPATIENT)
Dept: FAMILY MEDICINE | Facility: CLINIC | Age: 79
End: 2024-05-01
Payer: COMMERCIAL

## 2024-05-01 DIAGNOSIS — I10 HYPERTENSION, UNSPECIFIED TYPE: Primary | ICD-10-CM

## 2024-05-02 RX ORDER — HYDROCHLOROTHIAZIDE 12.5 MG/1
12.5 TABLET ORAL DAILY
Qty: 90 TABLET | Refills: 3 | Status: SHIPPED | OUTPATIENT
Start: 2024-05-02

## 2024-05-19 ENCOUNTER — MYC MEDICAL ADVICE (OUTPATIENT)
Dept: FAMILY MEDICINE | Facility: CLINIC | Age: 79
End: 2024-05-19
Payer: COMMERCIAL

## 2024-05-22 ENCOUNTER — TELEPHONE (OUTPATIENT)
Dept: FAMILY MEDICINE | Facility: CLINIC | Age: 79
End: 2024-05-22
Payer: COMMERCIAL

## 2024-05-22 NOTE — TELEPHONE ENCOUNTER
Prior Authorization Retail Medication Request    Medication/Dose: Meloxicam 7.5 mg  Diagnosis and ICD code (if different than what is on RX):    New/renewal/insurance change PA/secondary ins. PA:  Previously Tried and Failed:    Rationale:      Insurance   Primary: MEDICA PART D  Insurance ID:  [id number]  [phone]    1 per day per ins, limitation override      Thank You,  Tamara Lyons, [facility]

## 2024-06-03 NOTE — TELEPHONE ENCOUNTER
Retail Pharmacy Prior Authorization Team   Phone: [phone]    PA Initiation    Medication: MELOXICAM 7.5 MG PO TABS  Insurance Company: [employer] ([employer]) - Phone [phone] Fax [phone]  Pharmacy Filling the Rx: [facility] [place], [place] - [address] [place] [address]  Filling Pharmacy Phone: [phone]  Filling Pharmacy Fax:    Start Date: 6/3/2024

## 2024-06-03 NOTE — TELEPHONE ENCOUNTER
Prior Authorization Approval    Authorization Effective Date: 5/4/2024  Authorization Expiration Date: 6/3/2025  Medication: Meloxicam 7.5 mg-APPROVED  Reference #:     Insurance Company: [employer] ([employer]) - Phone [phone] Fax [phone]  Which Pharmacy is filling the prescription (Not needed for infusion/clinic administered): [facility] [place], [place] - [address] [place] [address]  Pharmacy Notified: Yes  Patient Notified: Instructed pharmacy to notify patient when script is ready to /ship.

## 2024-08-13 ENCOUNTER — TELEPHONE (OUTPATIENT)
Dept: FAMILY MEDICINE | Facility: CLINIC | Age: 79
End: 2024-08-13

## 2024-08-13 NOTE — TELEPHONE ENCOUNTER
Dr. Whitt,    Sunita Canales, EDITH, calling with update. Home care did a routine test for PAD, which resulted as abnormal. Patient is on Atorvastatin. Result will be mailed as well.    Thank you  Grzegorz ZALDIVAR RN  [facility]

## 2024-08-14 NOTE — TELEPHONE ENCOUNTER
Noted. Cholesterol controlled at last check. Could consider increased dose at next visit. No acute changes.     Sheeba Whitt MD

## 2024-08-27 ENCOUNTER — OFFICE VISIT (OUTPATIENT)
Dept: FAMILY MEDICINE | Facility: CLINIC | Age: 79
End: 2024-08-27
Payer: COMMERCIAL

## 2024-08-27 VITALS
OXYGEN SATURATION: 96 % | SYSTOLIC BLOOD PRESSURE: 150 MMHG | DIASTOLIC BLOOD PRESSURE: 78 MMHG | HEIGHT: 66 IN | WEIGHT: 198 LBS | HEART RATE: 60 BPM | RESPIRATION RATE: 16 BRPM | BODY MASS INDEX: 31.82 KG/M2

## 2024-08-27 DIAGNOSIS — Z01.818 PREOP GENERAL PHYSICAL EXAM: Primary | ICD-10-CM

## 2024-08-27 DIAGNOSIS — E78.5 HYPERLIPIDEMIA LDL GOAL <130: ICD-10-CM

## 2024-08-27 DIAGNOSIS — H02.403 PTOSIS OF BOTH EYELIDS: ICD-10-CM

## 2024-08-27 DIAGNOSIS — I45.2 RBBB (RIGHT BUNDLE BRANCH BLOCK WITH LEFT ANTERIOR FASCICULAR BLOCK): ICD-10-CM

## 2024-08-27 DIAGNOSIS — Z87.891 PERSONAL HISTORY OF TOBACCO USE, PRESENTING HAZARDS TO HEALTH: ICD-10-CM

## 2024-08-27 PROCEDURE — 93000 ELECTROCARDIOGRAM COMPLETE: CPT | Performed by: STUDENT IN AN ORGANIZED HEALTH CARE EDUCATION/TRAINING PROGRAM

## 2024-08-27 PROCEDURE — 99214 OFFICE O/P EST MOD 30 MIN: CPT | Performed by: STUDENT IN AN ORGANIZED HEALTH CARE EDUCATION/TRAINING PROGRAM

## 2024-08-27 ASSESSMENT — PAIN SCALES - GENERAL: PAINLEVEL: MODERATE PAIN (4)

## 2024-08-27 NOTE — PROGRESS NOTES
Preoperative Evaluation  [facility]  [address] [place] [id number]  Phone: [phone]  Fax: [phone]  Primary Provider: [facility]  Pre-op Performing Provider: Sheeba Whitt MD  Aug 27, 2024             8/26/2024   Surgical Information   What procedure is being done? Bilateral Upper Eyelid Blepharoplasty, Blepharoptosis Repair, and Brow Lift.   Facility or Hospital where procedure/surgery will be performed: [facility]   Who is doing the procedure / surgery? Ines Puckett MD   Date of surgery / procedure: 9/11/24   Time of surgery / procedure: NA   Where do you plan to recover after surgery? at home with family        Fax number for surgical facility: [phone]    Assessment & Plan     The proposed surgical procedure is considered LOW risk.    Preop general physical exam  Ptosis of both eyelids  Patient is a very pleasant 79 year old gentleman who presents today for preop evaluation prior to eyelid and brow surgical repair history of RBBB, repeat EKG today stable. Other labs as requested by surgery team to be done within 48 hours of procedure, he will return to clinic for those.   - Basic metabolic panel; Future  - Hemoglobin; Future  - EKG 12-lead complete w/read - Clinics    Hyperlipidemia LDL goal <130  Will obtain with future labs for routine testing.   - Lipid panel reflex to direct LDL Non-fasting; Future    Personal history of tobacco use, presenting hazards to health  Routine evalaution, does not need to be done preop.   - CT Chest Lung Cancer Scrn Low Dose wo; Future    RBBB (right bundle branch block with left anterior fascicular block)  Noted again on EKG today, no changes.         - No identified additional risk factors other than previously addressed    Antiplatelet or Anticoagulation Medication Instructions   - Patient is on no antiplatelet or anticoagulation medications.    Additional Medication  Instructions  Take all scheduled medications on the day of surgery EXCEPT for modifications listed below:   - ACE/ARB: Continue without modification (e.g., MAC anesthesia, neurosurgery, spine surgery, heart failure, or labile hypertension with risk of hypertension).   - Beta Blockers: Continue taking on the day of surgery.   - Diuretics: DO NOT TAKE on the day of surgery.   - Statins: Continue taking on the day of surgery.   -  Cholestyramine - DO NOT TAKE on the day of surgery.   - Parkinson's medications: Continue without modification.   - pregabalin, gabapentin: Continue without modification.   - meloxicam (Mobic): DO NOT TAKE 10 days before surgery.    - anticholinergics: Continue without modification.     Recommendation  Approval given to proceed with proposed procedure, without further diagnostic evaluation.      I spent a total of 30 minutes on the day of the visit.   Time spent by me doing chart review, history and exam, documentation and further activities per the note    Subjective   Peña is a 79 year old, presenting for the following:  Pre-Op Exam          8/27/2024     9:40 AM   Additional Questions   Roomed by Sammie BLISS   Accompanied by Self     HPI related to upcoming procedure:   Eyelid and brow drooping.       8/26/2024   Pre-Op Questionnaire   Have you ever had a heart attack or stroke? No   Have you ever had surgery on your heart or blood vessels, such as a stent placement, a coronary artery bypass, or surgery on an artery in your head, neck, heart, or legs? No   Do you have chest pain with activity? No   Do you have a history of heart failure? No   Do you currently have a cold, bronchitis or symptoms of other infection? No   Do you have a cough, shortness of breath, or wheezing? No   Do you or anyone in your family have previous history of blood clots? (!) UNKNOWN, no known.    Do you or does anyone in your family have a serious bleeding problem such as prolonged bleeding following surgeries  or cuts? No   Have you ever had problems with anemia or been told to take iron pills? No   Have you had any abnormal blood loss such as black, tarry or bloody stools? (!) YES, not recent. Last had this 6 years ago after colonoscopy.    Have you ever had a blood transfusion? No   Are you willing to have a blood transfusion if it is medically needed before, during, or after your surgery? Yes   Have you or any of your relatives ever had problems with anesthesia? No   Do you have sleep apnea, excessive snoring or daytime drowsiness? No   Do you have any artifical heart valves or other implanted medical devices like a pacemaker, defibrillator, or continuous glucose monitor? No   Do you have artificial joints? (!) YES, left knee, bilat hips, fused right ankle.    Are you allergic to latex? No        Health Care Directive  Patient has a Health Care Directive on file      Preoperative Review of    reviewed - controlled substances reflected in medication list.    Status of Chronic Conditions:  HYPERTENSION - Patient has longstanding history of HTN , currently denies any symptoms referable to elevated blood pressure. Specifically denies chest pain, palpitations, dyspnea, orthopnea, PND or peripheral edema. Blood pressure readings have not been in normal range. Current medication regimen is as listed below. Patient denies any side effects of medication.     Patient Active Problem List    Diagnosis Date Noted    Mild intermittent asthma without complication 01/03/2024     Priority: Medium     Likely more copd picture       Tremor 01/03/2024     Priority: Medium     Improved on primidone.  Diarrhea side effect?        DDD (degenerative disc disease), lumbar 12/22/2023     Priority: Medium    Lumbar facet arthropathy 12/22/2023     Priority: Medium    Lumbar spondylolysis 12/22/2023     Priority: Medium    Abnormal CT lung screening 11/27/2023     Priority: Medium     Currently managed with follow up imaging by [facility].        Chronic diarrhea 10/22/2023     Priority: Medium     After colon resection.        Chronic midline low back pain without sciatica 10/22/2023     Priority: Medium    History of colon cancer 10/22/2023     Priority: Medium    HTN (hypertension) 10/22/2023     Priority: Medium    Hyperlipidemia LDL goal <130 10/22/2023     Priority: Medium    BPH (benign prostatic hyperplasia) 10/22/2023     Priority: Medium      Past Medical History:   Diagnosis Date    Arthritis     BPH (benign prostatic hyperplasia)     Chronic diarrhea 10/22/2023    Chronic midline low back pain without sciatica 10/22/2023    Colon cancer (H) 2000    COPD (chronic obstructive pulmonary disease) (H)     HTN (hypertension)     Hyperlipidemia LDL goal <130      Past Surgical History:   Procedure Laterality Date    ABDOMEN SURGERY  2000    APPENDECTOMY  1959    BOWEL RESECTION  2000    CHOLECYSTECTOMY  2018    COLONOSCOPY N/A 08/18/2023    Procedure: COLONOSCOPY, WITH POLYPECTOMY AND BIOPSY;  Surgeon: Marli Lyons MD;  Location: [facility]    COLONOSCOPY N/A 08/18/2023    Procedure: COLONOSCOPY, FLEXIBLE, WITH LESION REMOVAL USING SNARE;  Surgeon: Marli Lyons MD;  Location: [facility]    EYE SURGERY  2018    ORTHOPEDIC SURGERY  2012 2013 2012 2015     Current Outpatient Medications   Medication Sig Dispense Refill    albuterol (PROAIR HFA/PROVENTIL HFA/VENTOLIN HFA) 108 (90 Base) MCG/ACT inhaler Inhale 2 puff using inhaler every four hours as needed for wheezing. pretreat before activity 18 g 1    atorvastatin (LIPITOR) 20 MG tablet Take 1 tablet (20 mg) by mouth every evening 90 tablet 3    cholecalciferol (VITAMIN D3) 125 mcg (5000 units) capsule Take 125 mcg by mouth daily      cholestyramine (QUESTRAN) 4 g packet Take 2 packets (8 g) by mouth 2 times daily (with meals) 360 packet 3    diphenhydrAMINE-acetaminophen (TYLENOL PM)  MG tablet Take 2 tablets by mouth nightly as needed for sleep      finasteride  "(PROSCAR) 5 MG tablet Take 1 tablet (5 mg) by mouth daily 90 tablet 3    gabapentin (NEURONTIN) 100 MG capsule Take 1 capsule (100 mg) by mouth daily 90 capsule 3    hydroCHLOROthiazide 12.5 MG tablet Take 1 tablet (12.5 mg) by mouth daily 90 tablet 3    lisinopril (ZESTRIL) 40 MG tablet Take 1 tablet (40 mg) by mouth daily 90 tablet 3    meloxicam (MOBIC) 7.5 MG tablet Take 1.5 tablets (11.25 mg) by mouth daily 135 tablet 3    primidone (MYSOLINE) 250 MG tablet Take 2 tablets (500 mg) by mouth every evening 180 tablet 3    propranolol ER (INDERAL LA) 60 MG 24 hr capsule Take 1 capsule (60 mg) by mouth daily 90 capsule 3    tamsulosin (FLOMAX) 0.4 MG capsule Take 2 capsules (0.8 mg) by mouth daily 180 capsule 3       No Known Allergies     Social History     Tobacco Use    Smoking status: Former     Current packs/day: 0.00     Average packs/day: 1 pack/day for 48.0 years (48.0 ttl pk-yrs)     Types: Cigarettes     Start date: 1970     Quit date: 2016     Years since quittin.4    Smokeless tobacco: Never   Substance Use Topics    Alcohol use: Yes     Comment: rare     Family History   Problem Relation Age of Onset    Diabetes Sister     Colon Cancer Other         Aunt     History   Drug Use Unknown             Review of Systems  Constitutional, HEENT, cardiovascular, pulmonary, gi and gu systems are negative, except as otherwise noted.    Objective    BP (!) 150/78 (BP Location: Right arm, Patient Position: Sitting, Cuff Size: Adult Regular)   Pulse 60   Resp 16   Ht 1.664 m (5' 5.5\")   Wt 89.8 kg (198 lb)   SpO2 96%   BMI 32.45 kg/m     Estimated body mass index is 32.45 kg/m  as calculated from the following:    Height as of this encounter: 1.664 m (5' 5.5\").    Weight as of this encounter: 89.8 kg (198 lb).  Physical Exam  GENERAL: alert and no distress  EYES: bilateral ptosis of eyelids and brows. conjunctivae and sclerae normal  HENT: ear canals and TM's normal, nose and mouth without ulcers " or lesions  NECK: no adenopathy, no asymmetry, masses, or scars  RESP: lungs clear to auscultation - no rales, rhonchi or wheezes  CV: regular rate and rhythm, normal S1 S2, no S3 or S4, no murmur, click or rub, no peripheral edema  ABDOMEN: soft, nontender, no hepatosplenomegaly, no masses and bowel sounds normal  MS: no gross musculoskeletal defects noted, no edema  SKIN: no suspicious lesions or rashes  NEURO: Normal strength and tone, mentation intact and speech normal  PSYCH: mentation appears normal, affect normal/bright    Recent Labs   Lab Test 01/19/24  1005 10/23/23  0649 10/22/23  1521 10/22/23  0553   HGB  --  13.7  --  12.4*   PLT  --  171  --  160    139  --  130*   POTASSIUM 4.2 3.6   < > 3.2*   CR 0.88 0.91  --  1.24*    < > = values in this interval not displayed.        Diagnostics  Labs pending at this time.  Results will be reviewed when available.   EKG required for previous RBBB and not completed in the last 90 days.     Revised Cardiac Risk Index (RCRI)  The patient has the following serious cardiovascular risks for perioperative complications:   - No serious cardiac risks = 0 points     RCRI Interpretation: 0 points: Class I (very low risk - 0.4% complication rate)         Signed Electronically by: Sheeba Whitt MD  A copy of this evaluation report is provided to the requesting physician.

## 2024-08-27 NOTE — PATIENT INSTRUCTIONS
115/60  to 140/90 no more, no less. If we can't get that balance, then we opt for the higher level.     After surgery, lets go up to 25 mg hydrochlorothiazide again and monitor home blood pressures over the next 2 weeks after that change, message me with those numbers.     If you're having low pressure below 115/60 frequently, go back to just 12.5 mg hydrochlorothiazide and message me.       How to Take Your Medication Before Surgery  Preoperative Medication Instructions      Hold hydrochlorothiazide, cholestyramine the morning of surgery.   Hold meloxicam for 10 days before surgery   No ibuprofen within 24 hours of surgery.     Patient Education   Preparing for Your Surgery  Getting started  A nurse will call you to review your health history and instructions. They will give you an arrival time based on your scheduled surgery time. Please be ready to share:  Your doctor's clinic name and phone number  Your medical, surgical, and anesthesia history  A list of allergies and sensitivities  A list of medicines, including herbal treatments and over-the-counter drugs  Whether the patient has a legal guardian (ask how to send us the papers in advance)  Please tell us if you're pregnant--or if there's any chance you might be pregnant. Some surgeries may injure a fetus (unborn baby), so they require a pregnancy test. Surgeries that are safe for a fetus don't always need a test, and you can choose whether to have one.   If you have a child who's having surgery, please ask for a copy of Preparing for Your Child's Surgery.    Preparing for surgery  Within 10 to 30 days of surgery: Have a pre-op exam (sometimes called an H&P, or History and Physical). This can be done at a clinic or pre-operative center.  If you're having a , you may not need this exam. Talk to your care team.  At your pre-op exam, talk to your care team about all medicines you take. If you need to stop any medicines before surgery, ask when to start  taking them again.  We do this for your safety. Many medicines can make you bleed too much during surgery. Some change how well surgery (anesthesia) drugs work.  Call your insurance company to let them know you're having surgery. (If you don't have insurance, call [phone].)  Call your clinic if there's any change in your health. This includes signs of a cold or flu (sore throat, runny nose, cough, rash, fever). It also includes a scrape or scratch near the surgery site.  If you have questions on the day of surgery, call your hospital or surgery center.  Eating and drinking guidelines  For your safety: Unless your surgeon tells you otherwise, follow the guidelines below.  Eat and drink as usual until 8 hours before you arrive for surgery. After that, no food or milk.  Drink clear liquids until 2 hours before you arrive. These are liquids you can see through, like water, Gatorade, and Propel Water. They also include plain black coffee and tea (no cream or milk), candy, and breath mints. You can spit out gum when you arrive.  If you drink alcohol: Stop drinking it the night before surgery.  If your care team tells you to take medicine on the morning of surgery, it's okay to take it with a sip of water.  Preventing infection  Shower or bathe the night before and morning of your surgery. Follow the instructions your clinic gave you. (If no instructions, use regular soap.)  Don't shave or clip hair near your surgery site. We'll remove the hair if needed.  Don't smoke or vape the morning of surgery. You may chew nicotine gum up to 2 hours before surgery. A nicotine patch is okay.  Note: Some surgeries require you to completely quit smoking and nicotine. Check with your surgeon.  Your care team will make every effort to keep you safe from infection. We will:  Clean our hands often with soap and water (or an alcohol-based hand rub).  Clean the skin at your surgery site with a special soap that kills germs.  Give you a  special gown to keep you warm. (Cold raises the risk of infection.)  Wear special hair covers, masks, gowns and gloves during surgery.  Give antibiotic medicine, if prescribed. Not all surgeries need antibiotics.  What to bring on the day of surgery  Photo ID and insurance card  Copy of your health care directive, if you have one  Glasses and hearing aids (bring cases)  You can't wear contacts during surgery  Inhaler and eye drops, if you use them (tell us about these when you arrive)  CPAP machine or breathing device, if you use them  A few personal items, if spending the night  If you have . . .  A pacemaker, ICD (cardiac defibrillator) or other implant: Bring the ID card.  An implanted stimulator: Bring the remote control.  A legal guardian: Bring a copy of the certified (court-stamped) guardianship papers.  Please remove any jewelry, including body piercings. Leave jewelry and other valuables at home.  If you're going home the day of surgery  You must have a responsible adult drive you home. They should stay with you overnight as well.  If you don't have someone to stay with you, and you aren't safe to go home alone, we may keep you overnight. Insurance often won't pay for this.  After surgery  If it's hard to control your pain or you need more pain medicine, please call your surgeon's office.  Questions?   If you have any questions for your care team, list them here: _________________________________________________________________________________________________________________________________________________________________________ ____________________________________ ____________________________________ ____________________________________  For informational purposes only. Not to replace the advice of your health care provider. Copyright   2003, 2019 [facility]. All rights reserved. Clinically reviewed by Kinza Ogden MD. [employer] [id number] - REV 12/22.

## 2024-09-09 ENCOUNTER — LAB (OUTPATIENT)
Dept: LAB | Facility: CLINIC | Age: 79
End: 2024-09-09
Payer: COMMERCIAL

## 2024-09-09 DIAGNOSIS — Z01.818 PREOP GENERAL PHYSICAL EXAM: ICD-10-CM

## 2024-09-09 DIAGNOSIS — E78.5 HYPERLIPIDEMIA LDL GOAL <130: ICD-10-CM

## 2024-09-09 LAB
ANION GAP SERPL CALCULATED.3IONS-SCNC: 9 MMOL/L (ref 7–15)
BUN SERPL-MCNC: 14.8 MG/DL (ref 8–23)
CALCIUM SERPL-MCNC: 9 MG/DL (ref 8.8–10.4)
CHLORIDE SERPL-SCNC: 102 MMOL/L (ref 98–107)
CHOLEST SERPL-MCNC: 135 MG/DL
CREAT SERPL-MCNC: 0.88 MG/DL (ref 0.67–1.17)
EGFRCR SERPLBLD CKD-EPI 2021: 89 ML/MIN/1.73M2
FASTING STATUS PATIENT QL REPORTED: ABNORMAL
FASTING STATUS PATIENT QL REPORTED: NORMAL
GLUCOSE SERPL-MCNC: 91 MG/DL (ref 70–99)
HCO3 SERPL-SCNC: 29 MMOL/L (ref 22–29)
HDLC SERPL-MCNC: 32 MG/DL
HGB BLD-MCNC: 13.9 G/DL (ref 13.3–17.7)
LDLC SERPL CALC-MCNC: 31 MG/DL
NONHDLC SERPL-MCNC: 103 MG/DL
POTASSIUM SERPL-SCNC: 3.7 MMOL/L (ref 3.4–5.3)
SODIUM SERPL-SCNC: 140 MMOL/L (ref 135–145)
TRIGL SERPL-MCNC: 358 MG/DL

## 2024-09-09 PROCEDURE — 85018 HEMOGLOBIN: CPT

## 2024-09-09 PROCEDURE — 36415 COLL VENOUS BLD VENIPUNCTURE: CPT

## 2024-09-09 PROCEDURE — 80061 LIPID PANEL: CPT

## 2024-09-09 PROCEDURE — 80048 BASIC METABOLIC PNL TOTAL CA: CPT

## 2024-09-19 ENCOUNTER — MYC MEDICAL ADVICE (OUTPATIENT)
Dept: FAMILY MEDICINE | Facility: CLINIC | Age: 79
End: 2024-09-19
Payer: COMMERCIAL

## 2024-11-26 ENCOUNTER — HOSPITAL ENCOUNTER (OUTPATIENT)
Dept: CT IMAGING | Facility: CLINIC | Age: 79
Discharge: HOME | End: 2024-11-26
Attending: STUDENT IN AN ORGANIZED HEALTH CARE EDUCATION/TRAINING PROGRAM
Payer: COMMERCIAL

## 2024-11-26 DIAGNOSIS — Z87.891 PERSONAL HISTORY OF TOBACCO USE, PRESENTING HAZARDS TO HEALTH: ICD-10-CM

## 2024-11-26 PROCEDURE — 71271 CT THORAX LUNG CANCER SCR C-: CPT

## 2024-12-18 SDOH — HEALTH STABILITY: PHYSICAL HEALTH: ON AVERAGE, HOW MANY MINUTES DO YOU ENGAGE IN EXERCISE AT THIS LEVEL?: 20 MIN

## 2024-12-18 SDOH — HEALTH STABILITY: PHYSICAL HEALTH
ON AVERAGE, HOW MANY DAYS PER WEEK DO YOU ENGAGE IN MODERATE TO STRENUOUS EXERCISE (LIKE A BRISK WALK)?: PATIENT DECLINED

## 2024-12-18 ASSESSMENT — ASTHMA QUESTIONNAIRES
QUESTION_4 LAST FOUR WEEKS HOW OFTEN HAVE YOU USED YOUR RESCUE INHALER OR NEBULIZER MEDICATION (SUCH AS ALBUTEROL): ONCE A WEEK OR LESS
QUESTION_3 LAST FOUR WEEKS HOW OFTEN DID YOUR ASTHMA SYMPTOMS (WHEEZING, COUGHING, SHORTNESS OF BREATH, CHEST TIGHTNESS OR PAIN) WAKE YOU UP AT NIGHT OR EARLIER THAN USUAL IN THE MORNING: ONCE OR TWICE
QUESTION_1 LAST FOUR WEEKS HOW MUCH OF THE TIME DID YOUR ASTHMA KEEP YOU FROM GETTING AS MUCH DONE AT WORK, SCHOOL OR AT HOME: NONE OF THE TIME
QUESTION_2 LAST FOUR WEEKS HOW OFTEN HAVE YOU HAD SHORTNESS OF BREATH: ONCE OR TWICE A WEEK
ACT_TOTALSCORE: 21
ACT_TOTALSCORE: 21
QUESTION_5 LAST FOUR WEEKS HOW WOULD YOU RATE YOUR ASTHMA CONTROL: WELL CONTROLLED

## 2024-12-18 ASSESSMENT — SOCIAL DETERMINANTS OF HEALTH (SDOH): HOW OFTEN DO YOU GET TOGETHER WITH FRIENDS OR RELATIVES?: ONCE A WEEK

## 2024-12-23 ENCOUNTER — OFFICE VISIT (OUTPATIENT)
Dept: FAMILY MEDICINE | Facility: CLINIC | Age: 79
End: 2024-12-23
Payer: COMMERCIAL

## 2024-12-23 VITALS
RESPIRATION RATE: 20 BRPM | BODY MASS INDEX: 31.88 KG/M2 | OXYGEN SATURATION: 97 % | DIASTOLIC BLOOD PRESSURE: 72 MMHG | HEIGHT: 66 IN | HEART RATE: 67 BPM | WEIGHT: 198.4 LBS | SYSTOLIC BLOOD PRESSURE: 160 MMHG

## 2024-12-23 DIAGNOSIS — Z12.5 SCREENING FOR PROSTATE CANCER: ICD-10-CM

## 2024-12-23 DIAGNOSIS — E78.5 HYPERLIPIDEMIA LDL GOAL <130: ICD-10-CM

## 2024-12-23 DIAGNOSIS — G89.29 CHRONIC PAIN OF RIGHT KNEE: ICD-10-CM

## 2024-12-23 DIAGNOSIS — J45.20 MILD INTERMITTENT ASTHMA WITHOUT COMPLICATION: ICD-10-CM

## 2024-12-23 DIAGNOSIS — R31.0 GROSS HEMATURIA: ICD-10-CM

## 2024-12-23 DIAGNOSIS — I10 HYPERTENSION, UNSPECIFIED TYPE: ICD-10-CM

## 2024-12-23 DIAGNOSIS — N40.1 BENIGN PROSTATIC HYPERPLASIA WITH URINARY HESITANCY: ICD-10-CM

## 2024-12-23 DIAGNOSIS — R26.89 BALANCE PROBLEM: ICD-10-CM

## 2024-12-23 DIAGNOSIS — R25.1 TREMOR: ICD-10-CM

## 2024-12-23 DIAGNOSIS — R39.11 BENIGN PROSTATIC HYPERPLASIA WITH URINARY HESITANCY: ICD-10-CM

## 2024-12-23 DIAGNOSIS — Z00.00 ENCOUNTER FOR MEDICARE ANNUAL WELLNESS EXAM: Primary | ICD-10-CM

## 2024-12-23 DIAGNOSIS — M25.561 CHRONIC PAIN OF RIGHT KNEE: ICD-10-CM

## 2024-12-23 LAB
ALBUMIN UR-MCNC: ABNORMAL MG/DL
APPEARANCE UR: CLEAR
BACTERIA #/AREA URNS HPF: ABNORMAL /HPF
BILIRUB UR QL STRIP: NEGATIVE
COLOR UR AUTO: YELLOW
ERYTHROCYTE [DISTWIDTH] IN BLOOD BY AUTOMATED COUNT: 13.1 % (ref 10–15)
GLUCOSE UR STRIP-MCNC: NEGATIVE MG/DL
HCT VFR BLD AUTO: 44.4 % (ref 40–53)
HGB BLD-MCNC: 14.9 G/DL (ref 13.3–17.7)
HGB UR QL STRIP: ABNORMAL
KETONES UR STRIP-MCNC: NEGATIVE MG/DL
LEUKOCYTE ESTERASE UR QL STRIP: NEGATIVE
MCH RBC QN AUTO: 30 PG (ref 26.5–33)
MCHC RBC AUTO-ENTMCNC: 33.6 G/DL (ref 31.5–36.5)
MCV RBC AUTO: 89 FL (ref 78–100)
NITRATE UR QL: NEGATIVE
PH UR STRIP: 5 [PH] (ref 5–7)
PLATELET # BLD AUTO: 181 10E3/UL (ref 150–450)
PSA SERPL DL<=0.01 NG/ML-MCNC: 0.1 NG/ML (ref 0–6.5)
RBC # BLD AUTO: 4.97 10E6/UL (ref 4.4–5.9)
RBC #/AREA URNS AUTO: ABNORMAL /HPF
SP GR UR STRIP: 1.02 (ref 1–1.03)
SQUAMOUS #/AREA URNS AUTO: ABNORMAL /LPF
UROBILINOGEN UR STRIP-ACNC: 0.2 E.U./DL
WBC # BLD AUTO: 5.1 10E3/UL (ref 4–11)
WBC #/AREA URNS AUTO: ABNORMAL /HPF

## 2024-12-23 PROCEDURE — 36415 COLL VENOUS BLD VENIPUNCTURE: CPT | Performed by: STUDENT IN AN ORGANIZED HEALTH CARE EDUCATION/TRAINING PROGRAM

## 2024-12-23 PROCEDURE — 81001 URINALYSIS AUTO W/SCOPE: CPT | Performed by: STUDENT IN AN ORGANIZED HEALTH CARE EDUCATION/TRAINING PROGRAM

## 2024-12-23 PROCEDURE — G0439 PPPS, SUBSEQ VISIT: HCPCS | Performed by: STUDENT IN AN ORGANIZED HEALTH CARE EDUCATION/TRAINING PROGRAM

## 2024-12-23 PROCEDURE — G0103 PSA SCREENING: HCPCS | Performed by: STUDENT IN AN ORGANIZED HEALTH CARE EDUCATION/TRAINING PROGRAM

## 2024-12-23 PROCEDURE — 85027 COMPLETE CBC AUTOMATED: CPT | Performed by: STUDENT IN AN ORGANIZED HEALTH CARE EDUCATION/TRAINING PROGRAM

## 2024-12-23 PROCEDURE — 99214 OFFICE O/P EST MOD 30 MIN: CPT | Mod: 25 | Performed by: STUDENT IN AN ORGANIZED HEALTH CARE EDUCATION/TRAINING PROGRAM

## 2024-12-23 RX ORDER — PRIMIDONE 250 MG/1
500 TABLET ORAL EVERY EVENING
Qty: 180 TABLET | Refills: 3 | Status: SHIPPED | OUTPATIENT
Start: 2024-12-23

## 2024-12-23 RX ORDER — ATORVASTATIN CALCIUM 20 MG/1
20 TABLET, FILM COATED ORAL EVERY EVENING
Qty: 90 TABLET | Refills: 3 | Status: SHIPPED | OUTPATIENT
Start: 2024-12-23

## 2024-12-23 RX ORDER — LISINOPRIL 40 MG/1
40 TABLET ORAL DAILY
Qty: 90 TABLET | Refills: 3 | Status: SHIPPED | OUTPATIENT
Start: 2024-12-23

## 2024-12-23 RX ORDER — ALBUTEROL SULFATE 90 UG/1
INHALANT RESPIRATORY (INHALATION)
Qty: 18 G | Refills: 1 | Status: SHIPPED | OUTPATIENT
Start: 2024-12-23

## 2024-12-23 RX ORDER — TAMSULOSIN HYDROCHLORIDE 0.4 MG/1
0.8 CAPSULE ORAL DAILY
Qty: 180 CAPSULE | Refills: 3 | Status: SHIPPED | OUTPATIENT
Start: 2024-12-23

## 2024-12-23 RX ORDER — FINASTERIDE 5 MG/1
5 TABLET, FILM COATED ORAL DAILY
Qty: 90 TABLET | Refills: 3 | Status: SHIPPED | OUTPATIENT
Start: 2024-12-23

## 2024-12-23 ASSESSMENT — PAIN SCALES - GENERAL: PAINLEVEL_OUTOF10: SEVERE PAIN (6)

## 2024-12-23 NOTE — PATIENT INSTRUCTIONS
Patient Education   Preventive Care Advice   This is general advice given by our system to help you stay healthy. However, your care team may have specific advice just for you. Please talk to your care team about your preventive care needs.  Nutrition  Eat 5 or more servings of fruits and vegetables each day.  Try wheat bread, brown rice and whole grain pasta (instead of white bread, rice, and pasta).  Get enough calcium and vitamin D. Check the label on foods and aim for 100% of the RDA (recommended daily allowance).  Lifestyle  Exercise at least 150 minutes each week  (30 minutes a day, 5 days a week).  Do muscle strengthening activities 2 days a week. These help control your weight and prevent disease.  No smoking.  Wear sunscreen to prevent skin cancer.  Have a dental exam and cleaning every 6 months.  Yearly exams  See your health care team every year to talk about:  Any changes in your health.  Any medicines your care team has prescribed.  Preventive care, family planning, and ways to prevent chronic diseases.  Shots (vaccines)   HPV shots (up to age 26), if you've never had them before.  Hepatitis B shots (up to age 59), if you've never had them before.  COVID-19 shot: Get this shot when it's due.  Flu shot: Get a flu shot every year.  Tetanus shot: Get a tetanus shot every 10 years.  Pneumococcal, hepatitis A, and RSV shots: Ask your care team if you need these based on your risk.  Shingles shot (for age 50 and up)  General health tests  Diabetes screening:  Starting at age 35, Get screened for diabetes at least every 3 years.  If you are younger than age 35, ask your care team if you should be screened for diabetes.  Cholesterol test: At age 39, start having a cholesterol test every 5 years, or more often if advised.  Bone density scan (DEXA): At age 50, ask your care team if you should have this scan for osteoporosis (brittle bones).  Hepatitis C: Get tested at least once in your life.  STIs (sexually  transmitted infections)  Before age 24: Ask your care team if you should be screened for STIs.  After age 24: Get screened for STIs if you're at risk. You are at risk for STIs (including HIV) if:  You are sexually active with more than one person.  You don't use condoms every time.  You or a partner was diagnosed with a sexually transmitted infection.  If you are at risk for HIV, ask about PrEP medicine to prevent HIV.  Get tested for HIV at least once in your life, whether you are at risk for HIV or not.  Cancer screening tests  Cervical cancer screening: If you have a cervix, begin getting regular cervical cancer screening tests starting at age 21.  Breast cancer scan (mammogram): If you've ever had breasts, begin having regular mammograms starting at age 40. This is a scan to check for breast cancer.  Colon cancer screening: It is important to start screening for colon cancer at age 45.  Have a colonoscopy test every 10 years (or more often if you're at risk) Or, ask your provider about stool tests like a FIT test every year or Cologuard test every 3 years.  To learn more about your testing options, visit:   .  For help making a decision, visit:   https://bit.ly/xy62156.  Prostate cancer screening test: If you have a prostate, ask your care team if a prostate cancer screening test (PSA) at age 55 is right for you.  Lung cancer screening: If you are a current or former smoker ages 50 to 80, ask your care team if ongoing lung cancer screenings are right for you.  For informational purposes only. Not to replace the advice of your health care provider. Copyright   2023 [facility]. All rights reserved. Clinically reviewed by the [facility]. MSI 588448 - REV 01/24.  Preventing Falls: Care Instructions  Injuries and health problems such as trouble walking or poor eyesight can increase your risk of falling. So can some medicines. But there are things you can do to help  "prevent falls. You can exercise to get stronger. You can also arrange your home to make it safer.    Talk to your doctor about the medicines you take. Ask if any of them increase the risk of falls and whether they can be changed or stopped.   Try to exercise regularly. It can help improve your strength and balance. This can help lower your risk of falling.         Practice fall safety and prevention.   Wear low-heeled shoes that fit well and give your feet good support. Talk to your doctor if you have foot problems that make this hard.  Carry a cellphone or wear a medical alert device that you can use to call for help.  Use stepladders instead of chairs to reach high objects. Don't climb if you're at risk for falls. Ask for help, if needed.  Wear the correct eyeglasses, if you need them.        Make your home safer.   Remove rugs, cords, clutter, and furniture from walkways.  Keep your house well lit. Use night-lights in hallways and bathrooms.  Install and use sturdy handrails on stairways.  Wear nonskid footwear, even inside. Don't walk barefoot or in socks without shoes.        Be safe outside.   Use handrails, curb cuts, and ramps whenever possible.  Keep your hands free by using a shoulder bag or backpack.  Try to walk in well-lit areas. Watch out for uneven ground, changes in pavement, and debris.  Be careful in the winter. Walk on the grass or gravel when sidewalks are slippery. Use de-icer on steps and walkways. Add non-slip devices to shoes.    Put grab bars and nonskid mats in your shower or tub and near the toilet. Try to use a shower chair or bath bench when bathing.   Get into a tub or shower by putting in your weaker leg first. Get out with your strong side first. Have a phone or medical alert device in the bathroom with you.   Where can you learn more?  Go to https://www.[employer].net/patiented  Enter G117 in the search box to learn more about \"Preventing Falls: Care Instructions.\"  Current as of: " July 31, 2024  Content Version: 14.3    2024 [employer].   Care instructions adapted under license by your healthcare professional. If you have questions about a medical condition or this instruction, always ask your healthcare professional. [employer] disclaims any warranty or liability for your use of this information.    Hearing Loss: Care Instructions  Overview     Hearing loss is a sudden or slow decrease in how well you hear. It can range from slight to profound. Permanent hearing loss can occur with aging. It also can happen when you are exposed long-term to loud noise. Examples include listening to loud music, riding motorcycles, or being around other loud machines.  Hearing loss can affect your work and home life. It can make you feel lonely or depressed. You may feel that you have lost your independence. But hearing aids and other devices can help you hear better and feel connected to others.  Follow-up care is a key part of your treatment and safety. Be sure to make and go to all appointments, and call your doctor if you are having problems. It's also a good idea to know your test results and keep a list of the medicines you take.  How can you care for yourself at home?  Avoid loud noises whenever possible. This helps keep your hearing from getting worse.  Always wear hearing protection around loud noises.  Wear a hearing aid as directed.  A professional can help you pick a hearing aid that will work best for you.  You can also get hearing aids over the counter for mild to moderate hearing loss.  Have hearing tests as your doctor suggests. They can show whether your hearing has changed. Your hearing aid may need to be adjusted.  Use other devices as needed. These may include:  Telephone amplifiers and hearing aids that can connect to a television, stereo, radio, or microphone.  Devices that use lights or vibrations. These alert you to the doorbell, a ringing telephone, or a baby  "monitor.  Television closed-captioning. This shows the words at the bottom of the screen. Most new TVs can do this.  TTY (text telephone). This lets you type messages back and forth on the telephone instead of talking or listening. These devices are also called TDD. When messages are typed on the keyboard, they are sent over the phone line to a receiving TTY. The message is shown on a monitor.  Use text messaging, social media, and email if it is hard for you to communicate by telephone.  Try to learn a listening technique called speechreading. It is not lipreading. You pay attention to people's gestures, expressions, posture, and tone of voice. These clues can help you understand what a person is saying. Face the person you are talking to, and have them face you. Make sure the lighting is good. You need to see the other person's face clearly.  Think about counseling if you need help to adjust to your hearing loss.  When should you call for help?  Watch closely for changes in your health, and be sure to contact your doctor if:    You think your hearing is getting worse.     You have new symptoms, such as dizziness or nausea.   Where can you learn more?  Go to https://www.[employer].net/patiented  Enter R798 in the search box to learn more about \"Hearing Loss: Care Instructions.\"  Current as of: September 27, 2023  Content Version: 14.3    2024 [employer].   Care instructions adapted under license by your healthcare professional. If you have questions about a medical condition or this instruction, always ask your healthcare professional. [employer] disclaims any warranty or liability for your use of this information.       "

## 2024-12-23 NOTE — PROGRESS NOTES
"Preventive Care Visit  [facility]  Sheeba Whitt MD, Family Medicine  Dec 23, 2024      Assessment & Plan     Encounter for Medicare annual wellness exam  Patient is a very pleasant 79 year old who presents today for annual visit and to discuss below issues    Gross hematuria  Had this 2 days ago, very large amount at first, then tapered off throughout the day. Some slight low back discomfort, but no other symptoms. States he had similar issue many years back, saw urology for this. Plan to have him see urology again. We did complete UA with CBC today.    - Adult Urology  Referral  - UA Macroscopic with reflex to Microscopic and Culture - Lab Collect  - CBC with platelets  - CBC with platelets  - UA Macroscopic with reflex to Microscopic and Culture - Lab Collect    Chronic pain of right knee  Worsening in the past few months, sounds like bilateral joint lines with \"crunching\". Most suspect arthritis. Could try OTC diclofenac, but he would prefer to see specialist and discuss hyalgan injections. He is also interested in PT for balance and knee issues.   - Orthopedic  Referral  - Physical Therapy  Referral    Balance problem  Failed gait speed. No falls recently. He is interested in PT referral to assist with balance improvement and knee pain.   - Physical Therapy  Referral    Benign prostatic hyperplasia with urinary hesitancy  Refill.   - tamsulosin (FLOMAX) 0.4 MG capsule  Dispense: 180 capsule; Refill: 3  - finasteride (PROSCAR) 5 MG tablet  Dispense: 90 tablet; Refill: 3    Tremor  Refill, stable.   - primidone (MYSOLINE) 250 MG tablet  Dispense: 180 tablet; Refill: 3    Hypertension, unspecified type  Refill. Home BP in the 140 systolic, plan to continue with current regimen.   - lisinopril (ZESTRIL) 40 MG tablet  Dispense: 90 tablet; Refill: 3    Hyperlipidemia LDL goal <130  Refill.   - atorvastatin (LIPITOR) 20 MG tablet  Dispense: 90 tablet; " "Refill: 3    Mild intermittent asthma without complication  Refill.   - albuterol (PROAIR HFA/PROVENTIL HFA/VENTOLIN HFA) 108 (90 Base) MCG/ACT inhaler  Dispense: 18 g; Refill: 1    Screening for prostate cancer  Patient is interested in PSA, especially in the context of hematuria episode. This is drawn today.   - PSA, screen  - PSA, screen      Patient has been advised of split billing requirements and indicates understanding: Yes        BMI  Estimated body mass index is 32.02 kg/m  as calculated from the following:    Height as of this encounter: 1.676 m (5' 6\").    Weight as of this encounter: 90 kg (198 lb 6.4 oz).     Counseling  Appropriate preventive services were addressed with this patient via screening, questionnaire, or discussion as appropriate for fall prevention, nutrition, physical activity, Tobacco-use cessation, social engagement, weight loss and cognition.  Checklist reviewing preventive services available has been given to the patient.  Reviewed patient's diet, addressing concerns and/or questions.   The patient was instructed to see the dentist every 6 months.   The patient was provided with written information regarding signs of hearing loss.       Anitra Pompa is a 79 year old, presenting for the following:  Physical        12/23/2024     7:15 AM   Additional Questions   Roomed by Melissa BROOKS CMA   Accompanied by Self               HPI  Home BP around low 140's/60s most of the time. HR in the upper 50-low 60's      Right knee and right ankle.   Right ankle is fused.   Unsure if they're tied.   Right knee pain and crunching. Pain inside the joint. No swelling.   Worse in novermber. Seems to be acting up the past 5 months or so.   Wore a knee brace, down hill really hurts.     Right ankle: gets really sore some days. Cordell the lateral ankle.     Had blood in urine on Saturday. No clots.   The first time he noticed had had a BM and peed at the same time, blood in the toilet. Then later just " "voided and was blood. Then every time he went it was less and less. None yesterday.   No pain. Maybe a little pain in the back.   No history of kidney stones.     Saw a urologist before  They went in and looked at his bladder one time. Some issue with blood in urine at that time.   Didn't find anythign in the bladder. \"Looked at my kidneys\" not sure how, but saw, some kind of thing there.    Health Care Directive  Patient has a Health Care Directive on file  Advance care planning document is on file and is current.        12/18/2024   General Health   How would you rate your overall physical health? (!) FAIR   Feel stress (tense, anxious, or unable to sleep) Only a little   (!) STRESS CONCERN      12/18/2024   Nutrition   Diet: Regular (no restrictions)         12/18/2024   Exercise   Days per week of moderate/strenous exercise Patient declined   Average minutes spent exercising at this level 20 min         12/18/2024   Social Factors   Frequency of gathering with friends or relatives Once a week   Worry food won't last until get money to buy more No   Food not last or not have enough money for food? No   Do you have housing? (Housing is defined as stable permanent housing and does not include staying ouside in a car, in a tent, in an abandoned building, in an overnight shelter, or couch-surfing.) Yes   Are you worried about losing your housing? No   Lack of transportation? No   Unable to get utilities (heat,electricity)? No         12/23/2024   Fall Risk   Gait Speed Test (Document in seconds) 6   Gait Speed Test Interpretation Greater than 5.01 seconds - ABNORMAL          12/18/2024   Activities of Daily Living- Home Safety   Needs help with the following daily activites None of the above   Safety concerns in the home None of the above         12/18/2024   Dental   Dentist two times every year? (!) NO         12/18/2024   Hearing Screening   Hearing concerns? (!) I NEED TO ASK PEOPLE TO SPEAK UP OR REPEAT " THEMSELVES.    (!) IT'S HARDER TO UNDERSTAND WOMEN'S VOICES THAN MEN'S VOICES.    (!) IT'S HARD TO FOLLOW A CONVERSATION IN A NOISY RESTAURANT OR CROWDED ROOM.    (!) TROUBLE UNDESTANDING A SPEAKER IN A PUBLIC MEETING OR [employer] SERVICE.    (!) TROUBLE UNDERSTANDING SOFT OR WHISPERED SPEECH.    Has some hearing aids, but doesn't wear them.   Bother him when he takes them out. Feels like ears are plugged all the time.      Multiple values from one day are sorted in reverse-chronological order         2024   Driving Risk Screening   Patient/family members have concerns about driving No         2024   General Alertness/Fatigue Screening   Have you been more tired than usual lately? No         2024   Urinary Incontinence Screening   Bothered by leaking urine in past 6 months No         2024   TB Screening   Were you born outside of the [place]? No         Today's PHQ-2 Score:       2024     7:25 PM   PHQ-2 (  [employer])   Q1: Little interest or pleasure in doing things 0   Q2: Feeling down, depressed or hopeless 0   PHQ-2 Score 0    Q1: Little interest or pleasure in doing things Not at all   Q2: Feeling down, depressed or hopeless Not at all   PHQ-2 Score 0       Patient-reported           2024   Substance Use   Alcohol more than 3/day or more than 7/wk No   Do you have a current opioid prescription? No   How severe/bad is pain from 1 to 10? 3/10   Do you use any other substances recreationally? No     Social History     Tobacco Use    Smoking status: Former     Current packs/day: 0.00     Average packs/day: 1 pack/day for 48.0 years (48.0 ttl pk-yrs)     Types: Cigarettes     Start date: 1970     Quit date: 2016     Years since quittin.7    Smokeless tobacco: Never   Vaping Use    Vaping status: Never Used   Substance Use Topics    Alcohol use: Yes     Comment: rare    Drug use: Never       ASCVD Risk   The 10-year ASCVD risk score (Augusto LUI, et al., 2019) is:  "44.3%    Values used to calculate the score:      Age: 79 years      Sex: Male      Is Non- : No      Diabetic: No      Tobacco smoker: No      Systolic Blood Pressure: 160 mmHg      Is BP treated: Yes      HDL Cholesterol: 32 mg/dL      Total Cholesterol: 135 mg/dL          Reviewed and updated as needed this visit by Provider                    Current providers sharing in care for this patient include:  Patient Care Team:  Sheeba Whitt MD as PCP - General (Family Medicine)  Sheeba Whitt MD as Assigned PCP    The following health maintenance items are reviewed in Epic and correct as of today:  Health Maintenance   Topic Date Due    ASTHMA ACTION PLAN  Never done    HEPATITIS A IMMUNIZATION (1 of 2 - Risk 2-dose series) Never done    HEPATITIS B IMMUNIZATION (1 of 3 - Risk 3-dose series) Never done    ASTHMA CONTROL TEST  06/23/2025    ANNUAL REVIEW OF HM ORDERS  08/27/2025    BMP  09/09/2025    LIPID  09/09/2025    LUNG CANCER SCREENING  11/26/2025    MEDICARE ANNUAL WELLNESS VISIT  12/23/2025    FALL RISK ASSESSMENT  12/23/2025    GLUCOSE  09/09/2027    COLORECTAL CANCER SCREENING  08/18/2028    ADVANCE CARE PLANNING  12/23/2029    DTAP/TDAP/TD IMMUNIZATION (5 - Td or Tdap) 04/28/2033    HEPATITIS C SCREENING  Completed    PHQ-2 (once per calendar year)  Completed    INFLUENZA VACCINE  Completed    Pneumococcal Vaccine: 50+ Years  Completed    ZOSTER IMMUNIZATION  Completed    RSV VACCINE  Completed    COVID-19 Vaccine  Completed    HPV IMMUNIZATION  Aged Out    MENINGITIS IMMUNIZATION  Aged Out    RSV MONOCLONAL ANTIBODY  Aged Out         Review of Systems  Constitutional, HEENT, cardiovascular, pulmonary, gi and gu systems are negative, except as otherwise noted.     Objective    Exam  BP (!) 160/72 (BP Location: Right arm, Patient Position: Sitting, Cuff Size: Adult Large)   Pulse 67   Resp 20   Ht 1.676 m (5' 6\")   Wt 90 kg (198 lb 6.4 oz)   SpO2 97%   BMI 32.02 kg/m  " "   Estimated body mass index is 32.02 kg/m  as calculated from the following:    Height as of this encounter: 1.676 m (5' 6\").    Weight as of this encounter: 90 kg (198 lb 6.4 oz).    Physical Exam  GENERAL: alert and no distress  EYES: Eyes grossly normal to inspection, PERRL and conjunctivae and sclerae normal  HENT: ear canals  and left TM normal and right TM with scarring, nose and mouth without ulcers or lesions  NECK: no adenopathy, no asymmetry, masses, or scars  RESP: lungs clear to auscultation - no rales, rhonchi or wheezes  CV: regular rate and rhythm, normal S1 S2, no S3 or S4, no murmur, click or rub, no peripheral edema  ABDOMEN: soft, nontender, no hepatosplenomegaly, no masses and bowel sounds normal  MS: no gross musculoskeletal defects noted, no edema. No significant tenderness to right knee joint lines.  SKIN: no suspicious lesions or rashes  NEURO: Normal strength and tone, mentation intact and speech normal  PSYCH: mentation appears normal, affect normal/bright         12/23/2024   Mini Cog   Clock Draw Score 2 Normal   3 Item Recall 3 objects recalled   Mini Cog Total Score 5              Signed Electronically by: Sheeba Whitt MD    "

## 2025-01-06 ENCOUNTER — ANCILLARY PROCEDURE (OUTPATIENT)
Dept: GENERAL RADIOLOGY | Facility: CLINIC | Age: 80
End: 2025-01-06
Attending: FAMILY MEDICINE
Payer: COMMERCIAL

## 2025-01-06 ENCOUNTER — OFFICE VISIT (OUTPATIENT)
Dept: ORTHOPEDICS | Facility: CLINIC | Age: 80
End: 2025-01-06
Attending: STUDENT IN AN ORGANIZED HEALTH CARE EDUCATION/TRAINING PROGRAM
Payer: COMMERCIAL

## 2025-01-06 DIAGNOSIS — M25.571 CHRONIC PAIN OF RIGHT ANKLE: ICD-10-CM

## 2025-01-06 DIAGNOSIS — G89.29 CHRONIC PAIN OF RIGHT KNEE: ICD-10-CM

## 2025-01-06 DIAGNOSIS — G89.29 CHRONIC PAIN OF RIGHT ANKLE: ICD-10-CM

## 2025-01-06 DIAGNOSIS — M25.561 CHRONIC PAIN OF RIGHT KNEE: ICD-10-CM

## 2025-01-06 DIAGNOSIS — M25.571 CHRONIC PAIN OF RIGHT ANKLE: Primary | ICD-10-CM

## 2025-01-06 DIAGNOSIS — G89.29 CHRONIC PAIN OF RIGHT ANKLE: Primary | ICD-10-CM

## 2025-01-06 PROCEDURE — 99204 OFFICE O/P NEW MOD 45 MIN: CPT | Performed by: FAMILY MEDICINE

## 2025-01-06 PROCEDURE — 73562 X-RAY EXAM OF KNEE 3: CPT | Mod: TC | Performed by: INTERNAL MEDICINE

## 2025-01-06 PROCEDURE — 73610 X-RAY EXAM OF ANKLE: CPT | Mod: TC | Performed by: RADIOLOGY

## 2025-01-06 NOTE — PROGRESS NOTES
ASSESSMENT & PLAN    Peña was seen today for pain.    Diagnoses and all orders for this visit:    Chronic pain of right ankle  -     XR Ankle RT G/E 3 vw; Future    Chronic pain of right knee  -     Orthopedic  Referral  -     XR Knee Standing AP [place] Bilat Lat Right; Future      This issue is acute on chronic and Worsening.    # Acute on Chronic Right Ankle and Knee Pain: Peña Hall  was seen today for right ankle and knee pain. Symptoms had been going on for 3 months. On examination there are positive findings of tenderness to palpation over the right medial knee joint. Imaging findings showed severe right knee arthritis, right ankle fusion, subtalar arthritis. Likely cause of patient's condition due to foot/ankle arthritis, right knee arthritis.  Counseled patient on nature of condition and treatment options.  Given this plan as below, follow-up 1 mon as needed.     Image Findings: right knee and subtalar arthritis  Treatment: Activities as tolerated, home exercises given today, start PT  Medications/Injections: Limited tylenol/ibuprofen for pain for 1-2 weeks, Topical Voltaren gel, defer for now  Follow-up: In one month if symptoms do not improve, sooner if worsening  Can consider steroid injections      Familia Levine MD  [facility] [place]    -----  Chief Complaint   Patient presents with    Right Knee - Pain       SUBJECTIVE  Peña Hall is a/an 79 year old male who is seen in consultation at the request of  Sheeba Whitt M.D. for evaluation of right knee pain. Reviewed PCP notes.    The patient is seen by themselves.      Onset: A few month(s) ago. Patient describes injury as overuse while hunting. Saw PCP 12/23/24.   Location of Pain: right medial knee, right lateral ankle   Worsened by: walking, increased activity   Better with: rest, activity   Treatments tried: rest/activity avoidance  Associated symptoms: locking in knee, feeling of instability      Orthopedic/Surgical history: YES - Right ankle fusion 2012  Social History/Occupation: Retired     No family history pertinent to patient's problem today.       REVIEW OF SYSTEMS:  Review of Systems  Constitutional, HEENT, cardiovascular, pulmonary, gi and gu systems are negative, except as otherwise noted.    OBJECTIVE:  There were no vitals taken for this visit.   General: healthy, alert and in no distress  HEENT: no scleral icterus or conjunctival erythema  Skin: no suspicious lesions or rash. No jaundice.  CV: distal perfusion intact    Resp: normal respiratory effort without conversational dyspnea   Psych: normal mood and affect  Gait: normal steady gait with appropriate coordination and balance    Neuro: Normal light sensory exam of right lower extremity     Ortho Exam   RIGHT KNEE  Inspection:    Normal alignment; no edema, erythema, or ecchymosis present  Palpation:    Tender about the medial joint line. Remainder of bony and ligamentous landmarks are nontender.    No effusion is present    Patellofemoral crepitus is Present  Range of Motion:     00 extension to 1350 flexion  Strength:    Quadriceps 5/5, hamstrings 5/5, gastrocsoleus 5/5, and tibialis anterior 5/5    Extensor mechanism intact  Special Tests:    Positive: None    Negative: Patellar grind, MCL/valgus stress (0 & 30 deg), LCL/varus stress (0 & 30 deg), Lachman's, anterior drawer, posterior drawer    RIGHT ANKLE  Inspection:    No swelling, redness, edema or ecchymosis is observed  Palpation:    Tender about the subtalar joint. Remainder of bony and ligamentous landmarks are nontender.  Range of Motion:     Plantarflexion limited by tightness / dorsiflexion limited by tightness / inversion limited by tightness / eversion limited by tightness  Strength:    full       RADIOLOGY:  I independently ordered, visualized and reviewed these images with the patient  Right ankle and knee x-rays    Right knee arthritis    Right ankle  arthritis      Review of external notes as documented elsewhere in note  Review of the result(s) of each unique test - right ankle and knee joint x-rays       Disclaimer: This note consists of symbols derived from keyboarding, dictation and/or voice recognition software. As a result, there may be errors in the script that have gone undetected. Please consider this when interpreting information found in this chart.

## 2025-01-06 NOTE — PATIENT INSTRUCTIONS
# Acute on Chronic Right Ankle and Knee Pain: Peña Hall  was seen today for right ankle and knee pain. Symptoms had been going on for 3 months. On examination there are positive findings of tenderness to palpation over the right medial knee joint. Imaging findings showed severe right knee arthritis, right ankle fusion, subtalar arthritis. Likely cause of patient's condition due to foot/ankle arthritis, right knee arthritis.  Counseled patient on nature of condition and treatment options.  Given this plan as below, follow-up 1 mon as needed.     Image Findings: right knee and subtalar arthritis  Treatment: Activities as tolerated, home exercises given today, start PT  Medications/Injections: Limited tylenol/ibuprofen for pain for 1-2 weeks, Topical Voltaren gel, defer for now  Follow-up: In one month if symptoms do not improve, sooner if worsening  Can consider steroid injections    Please call [phone]   Ask for my team if you have any questions or concerns    If you have not yet received the influenza vaccine but would like to get one, please call  [phone] or you can schedule via [employer]    It was great seeing you today!    Familia Levine MD, CAQSM

## 2025-01-06 NOTE — LETTER
1/6/2025      Peña Hall  [address] [place] [address]      Dear Colleague,    Thank you for referring your patient, Peña Hall, to the [facility]. Please see a copy of my visit note below.    ASSESSMENT & PLAN    Peña was seen today for pain.    Diagnoses and all orders for this visit:    Chronic pain of right ankle  -     XR Ankle RT G/E 3 vw; Future    Chronic pain of right knee  -     Orthopedic  Referral  -     XR Knee Standing AP [place] Bilat Lat Right; Future      This issue is acute on chronic and Worsening.    # Acute on Chronic Right Ankle and Knee Pain: Peña Hall  was seen today for right ankle and knee pain. Symptoms had been going on for 3 months. On examination there are positive findings of tenderness to palpation over the right medial knee joint. Imaging findings showed severe right knee arthritis, right ankle fusion, subtalar arthritis. Likely cause of patient's condition due to foot/ankle arthritis, right knee arthritis.  Counseled patient on nature of condition and treatment options.  Given this plan as below, follow-up 1 mon as needed.     Image Findings: right knee and subtalar arthritis  Treatment: Activities as tolerated, home exercises given today, start PT  Medications/Injections: Limited tylenol/ibuprofen for pain for 1-2 weeks, Topical Voltaren gel, defer for now  Follow-up: In one month if symptoms do not improve, sooner if worsening  Can consider steroid injections      Familia Levine MD  [facility]    -----  Chief Complaint   Patient presents with     Right Knee - Pain       SUBJECTIVE  Peña Hall is a/an 79 year old male who is seen in consultation at the request of  Sheeba Whitt M.D. for evaluation of right knee pain. Reviewed PCP notes.    The patient is seen by themselves.      Onset: A few month(s) ago. Patient describes injury as overuse while hunting. Saw  PCP 12/23/24.   Location of Pain: right medial knee, right lateral ankle   Worsened by: walking, increased activity   Better with: rest, activity   Treatments tried: rest/activity avoidance  Associated symptoms: locking in knee, feeling of instability     Orthopedic/Surgical history: YES - Right ankle fusion 2012  Social History/Occupation: Retired     No family history pertinent to patient's problem today.       REVIEW OF SYSTEMS:  Review of Systems  Constitutional, HEENT, cardiovascular, pulmonary, gi and gu systems are negative, except as otherwise noted.    OBJECTIVE:  There were no vitals taken for this visit.   General: healthy, alert and in no distress  HEENT: no scleral icterus or conjunctival erythema  Skin: no suspicious lesions or rash. No jaundice.  CV: distal perfusion intact    Resp: normal respiratory effort without conversational dyspnea   Psych: normal mood and affect  Gait: normal steady gait with appropriate coordination and balance    Neuro: Normal light sensory exam of right lower extremity     Ortho Exam   RIGHT KNEE  Inspection:    Normal alignment; no edema, erythema, or ecchymosis present  Palpation:    Tender about the medial joint line. Remainder of bony and ligamentous landmarks are nontender.    No effusion is present    Patellofemoral crepitus is Present  Range of Motion:     00 extension to 1350 flexion  Strength:    Quadriceps 5/5, hamstrings 5/5, gastrocsoleus 5/5, and tibialis anterior 5/5    Extensor mechanism intact  Special Tests:    Positive: None    Negative: Patellar grind, MCL/valgus stress (0 & 30 deg), LCL/varus stress (0 & 30 deg), Lachman's, anterior drawer, posterior drawer    RIGHT ANKLE  Inspection:    No swelling, redness, edema or ecchymosis is observed  Palpation:    Tender about the subtalar joint. Remainder of bony and ligamentous landmarks are nontender.  Range of Motion:     Plantarflexion limited by tightness / dorsiflexion limited by tightness / inversion  limited by tightness / eversion limited by tightness  Strength:    full       RADIOLOGY:  I independently ordered, visualized and reviewed these images with the patient  Right ankle and knee x-rays    Right knee arthritis    Right ankle arthritis      Review of external notes as documented elsewhere in note  Review of the result(s) of each unique test - right ankle and knee joint x-rays       Disclaimer: This note consists of symbols derived from keyboarding, dictation and/or voice recognition software. As a result, there may be errors in the script that have gone undetected. Please consider this when interpreting information found in this chart.      Again, thank you for allowing me to participate in the care of your patient.        Sincerely,        Familia Levine MD    Electronically signed

## 2025-01-09 ENCOUNTER — OFFICE VISIT (OUTPATIENT)
Dept: UROLOGY | Facility: CLINIC | Age: 80
End: 2025-01-09
Payer: COMMERCIAL

## 2025-01-09 VITALS
DIASTOLIC BLOOD PRESSURE: 81 MMHG | TEMPERATURE: 97.4 F | HEART RATE: 52 BPM | SYSTOLIC BLOOD PRESSURE: 180 MMHG | WEIGHT: 198 LBS | BODY MASS INDEX: 31.96 KG/M2

## 2025-01-09 DIAGNOSIS — R31.0 GROSS HEMATURIA: ICD-10-CM

## 2025-01-09 PROBLEM — M25.561 CHRONIC PAIN OF RIGHT KNEE: Status: ACTIVE | Noted: 2025-01-09

## 2025-01-09 PROBLEM — R26.89 BALANCE PROBLEM: Status: ACTIVE | Noted: 2025-01-09

## 2025-01-09 PROBLEM — G89.29 CHRONIC PAIN OF RIGHT KNEE: Status: ACTIVE | Noted: 2025-01-09

## 2025-01-09 LAB
ALBUMIN UR-MCNC: 30 MG/DL
APPEARANCE UR: ABNORMAL
BACTERIA #/AREA URNS HPF: ABNORMAL /HPF
BILIRUB UR QL STRIP: NEGATIVE
COLOR UR AUTO: ABNORMAL
GLUCOSE UR STRIP-MCNC: NEGATIVE MG/DL
HGB UR QL STRIP: ABNORMAL
KETONES UR STRIP-MCNC: NEGATIVE MG/DL
LEUKOCYTE ESTERASE UR QL STRIP: NEGATIVE
MUCOUS THREADS #/AREA URNS LPF: PRESENT /LPF
NITRATE UR QL: NEGATIVE
PH UR STRIP: 5.5 [PH] (ref 5–7)
RBC #/AREA URNS AUTO: >100 /HPF
SP GR UR STRIP: >=1.03 (ref 1–1.03)
SQUAMOUS #/AREA URNS AUTO: ABNORMAL /LPF
UROBILINOGEN UR STRIP-ACNC: 0.2 E.U./DL
WBC #/AREA URNS AUTO: ABNORMAL /HPF

## 2025-01-09 ASSESSMENT — PAIN SCALES - GENERAL: PAINLEVEL_OUTOF10: NO PAIN (0)

## 2025-01-09 NOTE — NURSING NOTE
"Initial /81 (BP Location: Left arm, Patient Position: Chair, Cuff Size: Adult Large)   Pulse 52   Temp 97.4  F (36.3  C) (Tympanic)   Wt 89.8 kg (198 lb)   BMI 31.96 kg/m   Estimated body mass index is 31.96 kg/m  as calculated from the following:    Height as of 12/23/24: 1.676 m (5' 6\").    Weight as of this encounter: 89.8 kg (198 lb). .  Natacha Becerra LPN    "

## 2025-01-09 NOTE — PROGRESS NOTES
Chief Complaint:   Gross hematuria         History of Present Illness:   Peña Hall is a 79 year old male with a history of HTN and HLD who presents for evaluation of gross hematuria.     The patient reports two days of gross hematuria that has resolved.     He currently denies any dysuria, pyuria, hesitancy, intermittency, feelings of incomplete emptying, or any recent history of urinary tract infections or stones.    He quit smoking 10 years ago. He has a 48-50 pack year history.     He has a history of radiation for rectal cancer.     He had a negative microscopic hematuria evaluation in 2010.          Past Medical History:     Past Medical History:   Diagnosis Date    Arthritis     BPH (benign prostatic hyperplasia)     Chronic diarrhea 10/22/2023    Chronic midline low back pain without sciatica 10/22/2023    Colon cancer (H) 2000    COPD (chronic obstructive pulmonary disease) (H)     HTN (hypertension)     Hyperlipidemia LDL goal <130             Past Surgical History:     Past Surgical History:   Procedure Laterality Date    ABDOMEN SURGERY  2000    APPENDECTOMY  1959    BOWEL RESECTION  2000    CHOLECYSTECTOMY  2018    COLONOSCOPY N/A 08/18/2023    Procedure: COLONOSCOPY, WITH POLYPECTOMY AND BIOPSY;  Surgeon: Marli Lyons MD;  Location: [facility]    COLONOSCOPY N/A 08/18/2023    Procedure: COLONOSCOPY, FLEXIBLE, WITH LESION REMOVAL USING SNARE;  Surgeon: Marli Lyons MD;  Location: [facility]    EYE SURGERY  2018    ORTHOPEDIC SURGERY  2012 2013 2012 2015            Medications     Current Outpatient Medications   Medication Sig Dispense Refill    albuterol (PROAIR HFA/PROVENTIL HFA/VENTOLIN HFA) 108 (90 Base) MCG/ACT inhaler Inhale 2 puff using inhaler every four hours as needed for wheezing. pretreat before activity 18 g 1    atorvastatin (LIPITOR) 20 MG tablet Take 1 tablet (20 mg) by mouth every evening. 90 tablet 3    cholecalciferol (VITAMIN D3) 125 mcg (5000 units) capsule Take 125 mcg by  mouth daily      cholestyramine (QUESTRAN) 4 g packet Take 2 packets (8 g) by mouth 2 times daily (with meals) 360 packet 3    diphenhydrAMINE-acetaminophen (TYLENOL PM)  MG tablet Take 2 tablets by mouth nightly as needed for sleep      finasteride (PROSCAR) 5 MG tablet Take 1 tablet (5 mg) by mouth daily. 90 tablet 3    gabapentin (NEURONTIN) 100 MG capsule Take 1 capsule (100 mg) by mouth daily 90 capsule 3    hydroCHLOROthiazide 12.5 MG tablet Take 1 tablet (12.5 mg) by mouth daily 90 tablet 3    lisinopril (ZESTRIL) 40 MG tablet Take 1 tablet (40 mg) by mouth daily. 90 tablet 3    meloxicam (MOBIC) 7.5 MG tablet Take 1.5 tablets (11.25 mg) by mouth daily 135 tablet 3    primidone (MYSOLINE) 250 MG tablet Take 2 tablets (500 mg) by mouth every evening. 180 tablet 3    propranolol ER (INDERAL LA) 60 MG 24 hr capsule Take 1 capsule (60 mg) by mouth daily 90 capsule 3    tamsulosin (FLOMAX) 0.4 MG capsule Take 2 capsules (0.8 mg) by mouth daily. 180 capsule 3     No current facility-administered medications for this visit.            Allergies:   Patient has no known allergies.         Review of Systems:  From intake questionnaire   Negative 14 system review except as noted on HPI, nurse's note.         Physical Exam:   Patient is a 79 year old  male   Vitals: Blood pressure 180/81, pulse 52, temperature 97.4  F (36.3  C), temperature source Tympanic, weight 89.8 kg (198 lb).  General Appearance Adult: Alert, no acute distress, oriented.  Lungs: Non-labored breathing.  Heart: No obvious jugular venous distension present.  Neuro: Alert, oriented, speech and mentation normal      Labs and Pathology:    I personally reviewed all applicable laboratory data and went over findings with patient  Significant for:    CBC RESULTS:  Recent Labs   Lab Test 12/23/24  0757 09/09/24  0857 10/23/23  0649 10/22/23  0553 10/22/23  0123   WBC 5.1  --  4.7 6.9 6.7   HGB 14.9 13.9 13.7 12.4* 15.3     --  171 160 206         BMP RESULTS:  Recent Labs   Lab Test 09/09/24  0857 01/19/24  1005 10/23/23  0649 10/22/23  1521 10/22/23  0553    140 139  --  130*   POTASSIUM 3.7 4.2 3.6 3.8 3.2*   CHLORIDE 102 103 102  --  93*   CO2 29 30* 29  --  28   ANIONGAP 9 7 8  --  9   GLC 91 93 81  --  140*   BUN 14.8 16.7 17.4  --  33.0*   CR 0.88 0.88 0.91  --  1.24*   GFRESTIMATED 89 89 87  --  60*   SARAVANAN 9.0 9.4 8.4*  --  7.7*       UA RESULTS:   Recent Labs   Lab Test 12/23/24  0757   SG 1.020   URINEPH 5.0   NITRITE Negative   RBCU 25-50*   WBCU 0-5       PSA RESULTS  Prostate Specific Antigen Screen   Date Value Ref Range Status   12/23/2024 0.10 0.00 - 6.50 ng/mL Final              Assessment and Plan:     Assessment: Mr. Peña Hall is a pleasant 79 year old male with gross hematuria that was present for two days and resolved.  The differential diagnosis at this point includes stone disease, infection, urothelial malignancy, renal disorder versus another yet unknown diagnosis. He is a former smoker.     Plan:  At this time, recommend proceeding with comprehensive hematuria evaluation to include:  - Urinalysis and urine culture to rule out an acute urinary tract infection.   - Urine cytology to look for cells concerning for malignancy.  - CT urogram for upper tract imaging.  - Cystoscopy to evaluate the interior of the bladder. Follow up for hematuria as recommended by urologist performing cystoscopic evaluation.      JANIE YA PA-C  Department of Urology

## 2025-01-14 ENCOUNTER — THERAPY VISIT (OUTPATIENT)
Dept: PHYSICAL THERAPY | Facility: CLINIC | Age: 80
End: 2025-01-14
Attending: STUDENT IN AN ORGANIZED HEALTH CARE EDUCATION/TRAINING PROGRAM
Payer: COMMERCIAL

## 2025-01-14 DIAGNOSIS — G89.29 CHRONIC PAIN OF RIGHT KNEE: Primary | ICD-10-CM

## 2025-01-14 DIAGNOSIS — M25.561 CHRONIC PAIN OF RIGHT KNEE: Primary | ICD-10-CM

## 2025-01-14 PROCEDURE — 97112 NEUROMUSCULAR REEDUCATION: CPT | Mod: GP | Performed by: PHYSICAL MEDICINE & REHABILITATION

## 2025-01-14 PROCEDURE — 97110 THERAPEUTIC EXERCISES: CPT | Mod: GP | Performed by: PHYSICAL MEDICINE & REHABILITATION

## 2025-01-19 ENCOUNTER — APPOINTMENT (OUTPATIENT)
Dept: GENERAL RADIOLOGY | Facility: CLINIC | Age: 80
End: 2025-01-19
Attending: EMERGENCY MEDICINE
Payer: COMMERCIAL

## 2025-01-19 ENCOUNTER — HOSPITAL ENCOUNTER (EMERGENCY)
Facility: CLINIC | Age: 80
Discharge: HOME | End: 2025-01-19
Attending: EMERGENCY MEDICINE | Admitting: EMERGENCY MEDICINE
Payer: COMMERCIAL

## 2025-01-19 ENCOUNTER — APPOINTMENT (OUTPATIENT)
Dept: CT IMAGING | Facility: CLINIC | Age: 80
End: 2025-01-19
Attending: EMERGENCY MEDICINE
Payer: COMMERCIAL

## 2025-01-19 VITALS
SYSTOLIC BLOOD PRESSURE: 137 MMHG | TEMPERATURE: 98.3 F | RESPIRATION RATE: 18 BRPM | BODY MASS INDEX: 31.82 KG/M2 | OXYGEN SATURATION: 95 % | WEIGHT: 198 LBS | HEIGHT: 66 IN | DIASTOLIC BLOOD PRESSURE: 80 MMHG | HEART RATE: 57 BPM

## 2025-01-19 DIAGNOSIS — S61.411A SKIN TEAR OF RIGHT HAND WITHOUT COMPLICATION, INITIAL ENCOUNTER: ICD-10-CM

## 2025-01-19 DIAGNOSIS — M79.642 PAIN OF LEFT HAND: ICD-10-CM

## 2025-01-19 DIAGNOSIS — M79.641 PAIN OF RIGHT HAND: ICD-10-CM

## 2025-01-19 DIAGNOSIS — S60.222A TRAUMATIC ECCHYMOSIS OF LEFT HAND, INITIAL ENCOUNTER: ICD-10-CM

## 2025-01-19 DIAGNOSIS — S61.412A SKIN TEAR OF LEFT HAND WITHOUT COMPLICATION, INITIAL ENCOUNTER: ICD-10-CM
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
## 2025-01-19 DIAGNOSIS — S80.211A ABRASION OF RIGHT KNEE, INITIAL ENCOUNTER: ICD-10-CM

## 2025-01-19 LAB
ALBUMIN SERPL BCG-MCNC: 4.3 G/DL (ref 3.5–5.2)
ALP SERPL-CCNC: 78 U/L (ref 40–150)
ALT SERPL W P-5'-P-CCNC: 30 U/L (ref 0–70)
ANION GAP SERPL CALCULATED.3IONS-SCNC: 12 MMOL/L (ref 7–15)
AST SERPL W P-5'-P-CCNC: 34 U/L (ref 0–45)
BASOPHILS # BLD AUTO: 0.1 10E3/UL (ref 0–0.2)
BASOPHILS NFR BLD AUTO: 1 %
BILIRUB SERPL-MCNC: 0.3 MG/DL
BUN SERPL-MCNC: 13.4 MG/DL (ref 8–23)
CALCIUM SERPL-MCNC: 9 MG/DL (ref 8.8–10.4)
CHLORIDE SERPL-SCNC: 103 MMOL/L (ref 98–107)
CREAT SERPL-MCNC: 0.7 MG/DL (ref 0.67–1.17)
EGFRCR SERPLBLD CKD-EPI 2021: >90 ML/MIN/1.73M2
EOSINOPHIL # BLD AUTO: 0.1 10E3/UL (ref 0–0.7)
EOSINOPHIL NFR BLD AUTO: 1 %
ERYTHROCYTE [DISTWIDTH] IN BLOOD BY AUTOMATED COUNT: 13 % (ref 10–15)
GLUCOSE SERPL-MCNC: 103 MG/DL (ref 70–99)
HCO3 SERPL-SCNC: 26 MMOL/L (ref 22–29)
HCT VFR BLD AUTO: 44.6 % (ref 40–53)
HGB BLD-MCNC: 15.2 G/DL (ref 13.3–17.7)
IMM GRANULOCYTES # BLD: 0 10E3/UL
IMM GRANULOCYTES NFR BLD: 0 %
LACTATE SERPL-SCNC: 0.9 MMOL/L (ref 0.7–2)
LIPASE SERPL-CCNC: 22 U/L (ref 13–60)
LYMPHOCYTES # BLD AUTO: 1.2 10E3/UL (ref 0.8–5.3)
LYMPHOCYTES NFR BLD AUTO: 13 %
MCH RBC QN AUTO: 30.8 PG (ref 26.5–33)
MCHC RBC AUTO-ENTMCNC: 34.1 G/DL (ref 31.5–36.5)
MCV RBC AUTO: 90 FL (ref 78–100)
MONOCYTES # BLD AUTO: 0.5 10E3/UL (ref 0–1.3)
MONOCYTES NFR BLD AUTO: 6 %
NEUTROPHILS # BLD AUTO: 6.7 10E3/UL (ref 1.6–8.3)
NEUTROPHILS NFR BLD AUTO: 78 %
NRBC # BLD AUTO: 0 10E3/UL
NRBC BLD AUTO-RTO: 0 /100
PLATELET # BLD AUTO: 183 10E3/UL (ref 150–450)
POTASSIUM SERPL-SCNC: 3.6 MMOL/L (ref 3.4–5.3)
PROT SERPL-MCNC: 6.9 G/DL (ref 6.4–8.3)
RBC # BLD AUTO: 4.94 10E6/UL (ref 4.4–5.9)
SODIUM SERPL-SCNC: 141 MMOL/L (ref 135–145)
WBC # BLD AUTO: 8.6 10E3/UL (ref 4–11)

## 2025-01-19 PROCEDURE — 71046 X-RAY EXAM CHEST 2 VIEWS: CPT

## 2025-01-19 PROCEDURE — 83690 ASSAY OF LIPASE: CPT | Performed by: EMERGENCY MEDICINE

## 2025-01-19 PROCEDURE — 250N000013 HC RX MED GY IP 250 OP 250 PS 637: Performed by: EMERGENCY MEDICINE

## 2025-01-19 PROCEDURE — 83605 ASSAY OF LACTIC ACID: CPT | Performed by: EMERGENCY MEDICINE

## 2025-01-19 PROCEDURE — 36415 COLL VENOUS BLD VENIPUNCTURE: CPT | Performed by: EMERGENCY MEDICINE

## 2025-01-19 PROCEDURE — 70450 CT HEAD/BRAIN W/O DYE: CPT

## 2025-01-19 PROCEDURE — 80053 COMPREHEN METABOLIC PANEL: CPT | Performed by: EMERGENCY MEDICINE

## 2025-01-19 PROCEDURE — 73110 X-RAY EXAM OF WRIST: CPT | Mod: LT

## 2025-01-19 PROCEDURE — 85025 COMPLETE CBC W/AUTO DIFF WBC: CPT | Performed by: EMERGENCY MEDICINE

## 2025-01-19 PROCEDURE — 85018 HEMOGLOBIN: CPT | Performed by: EMERGENCY MEDICINE

## 2025-01-19 PROCEDURE — 250N000011 HC RX IP 250 OP 636: Performed by: EMERGENCY MEDICINE

## 2025-01-19 PROCEDURE — 96372 THER/PROPH/DIAG INJ SC/IM: CPT | Performed by: EMERGENCY MEDICINE

## 2025-01-19 PROCEDURE — 99285 EMERGENCY DEPT VISIT HI MDM: CPT | Mod: 25

## 2025-01-19 PROCEDURE — 72125 CT NECK SPINE W/O DYE: CPT

## 2025-01-19 PROCEDURE — 73130 X-RAY EXAM OF HAND: CPT | Mod: LT

## 2025-01-19 PROCEDURE — 99284 EMERGENCY DEPT VISIT MOD MDM: CPT | Performed by: EMERGENCY MEDICINE

## 2025-01-19 PROCEDURE — 73562 X-RAY EXAM OF KNEE 3: CPT | Mod: RT

## 2025-01-19 RX ORDER — HYDROCODONE BITARTRATE AND ACETAMINOPHEN 5; 325 MG/1; MG/1
1 TABLET ORAL EVERY 6 HOURS PRN
Qty: 10 TABLET | Refills: 0 | Status: SHIPPED | OUTPATIENT
Start: 2025-01-19 | End: 2025-01-22

## 2025-01-19 RX ORDER — ONDANSETRON 4 MG/1
4 TABLET, ORALLY DISINTEGRATING ORAL ONCE
Status: COMPLETED | OUTPATIENT
Start: 2025-01-19 | End: 2025-01-19

## 2025-01-19 RX ORDER — LORAZEPAM 0.5 MG/1
0.25 TABLET ORAL ONCE
Status: COMPLETED | OUTPATIENT
Start: 2025-01-19 | End: 2025-01-19

## 2025-01-19 RX ORDER — ACETAMINOPHEN 500 MG
1000 TABLET ORAL ONCE
Status: COMPLETED | OUTPATIENT
Start: 2025-01-19 | End: 2025-01-19

## 2025-01-19 RX ORDER — KETOROLAC TROMETHAMINE 15 MG/ML
15 INJECTION, SOLUTION INTRAMUSCULAR; INTRAVENOUS ONCE
Status: COMPLETED | OUTPATIENT
Start: 2025-01-19 | End: 2025-01-19

## 2025-01-19 RX ADMIN — ACETAMINOPHEN 1000 MG: 500 TABLET ORAL at 17:21

## 2025-01-19 RX ADMIN — OXYCODONE HYDROCHLORIDE 2.5 MG: 5 TABLET ORAL at 17:21

## 2025-01-19 RX ADMIN — KETOROLAC TROMETHAMINE 15 MG: 15 INJECTION, SOLUTION INTRAMUSCULAR; INTRAVENOUS at 19:53

## 2025-01-19 ASSESSMENT — COLUMBIA-SUICIDE SEVERITY RATING SCALE - C-SSRS
6. HAVE YOU EVER DONE ANYTHING, STARTED TO DO ANYTHING, OR PREPARED TO DO ANYTHING TO END YOUR LIFE?: NO
1. IN THE PAST MONTH, HAVE YOU WISHED YOU WERE DEAD OR WISHED YOU COULD GO TO SLEEP AND NOT WAKE UP?: NO
2. HAVE YOU ACTUALLY HAD ANY THOUGHTS OF KILLING YOURSELF IN THE PAST MONTH?: NO

## 2025-01-19 ASSESSMENT — ACTIVITIES OF DAILY LIVING (ADL)
ADLS_ACUITY_SCORE: 53
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
## 2025-01-29 ENCOUNTER — THERAPY VISIT (OUTPATIENT)
Dept: PHYSICAL THERAPY | Facility: CLINIC | Age: 80
End: 2025-01-29
Attending: STUDENT IN AN ORGANIZED HEALTH CARE EDUCATION/TRAINING PROGRAM
Payer: COMMERCIAL

## 2025-01-29 DIAGNOSIS — M25.561 CHRONIC PAIN OF RIGHT KNEE: Primary | ICD-10-CM

## 2025-01-29 DIAGNOSIS — G89.29 CHRONIC PAIN OF RIGHT KNEE: Primary | ICD-10-CM

## 2025-01-29 PROCEDURE — 97110 THERAPEUTIC EXERCISES: CPT | Mod: GP | Performed by: PHYSICAL MEDICINE & REHABILITATION

## 2025-01-30 ENCOUNTER — HOSPITAL ENCOUNTER (OUTPATIENT)
Dept: CT IMAGING | Facility: CLINIC | Age: 80
End: 2025-01-30
Attending: STUDENT IN AN ORGANIZED HEALTH CARE EDUCATION/TRAINING PROGRAM
Payer: COMMERCIAL

## 2025-01-30 DIAGNOSIS — R31.0 GROSS HEMATURIA: ICD-10-CM

## 2025-01-30 PROCEDURE — 74178 CT ABD&PLV WO CNTR FLWD CNTR: CPT

## 2025-01-30 PROCEDURE — 250N000009 HC RX 250: Performed by: STUDENT IN AN ORGANIZED HEALTH CARE EDUCATION/TRAINING PROGRAM

## 2025-01-30 PROCEDURE — 250N000011 HC RX IP 250 OP 636: Performed by: STUDENT IN AN ORGANIZED HEALTH CARE EDUCATION/TRAINING PROGRAM

## 2025-01-30 RX ORDER — IOPAMIDOL 755 MG/ML
96 INJECTION, SOLUTION INTRAVASCULAR ONCE
Status: COMPLETED | OUTPATIENT
Start: 2025-01-30 | End: 2025-01-30

## 2025-01-30 RX ADMIN — IOPAMIDOL 96 ML: 755 INJECTION, SOLUTION INTRAVENOUS at 09:37

## 2025-01-30 RX ADMIN — SODIUM CHLORIDE 100 ML: 9 INJECTION, SOLUTION INTRAVENOUS at 09:38

## 2025-02-18 ENCOUNTER — PREP FOR PROCEDURE (OUTPATIENT)
Dept: SURGERY | Facility: HOSPITAL | Age: 80
End: 2025-02-18

## 2025-02-18 ENCOUNTER — OFFICE VISIT (OUTPATIENT)
Dept: UROLOGY | Facility: CLINIC | Age: 80
End: 2025-02-18
Payer: COMMERCIAL

## 2025-02-18 VITALS — HEART RATE: 64 BPM | RESPIRATION RATE: 16 BRPM | SYSTOLIC BLOOD PRESSURE: 123 MMHG | DIASTOLIC BLOOD PRESSURE: 68 MMHG

## 2025-02-18 DIAGNOSIS — R31.0 GROSS HEMATURIA: Primary | ICD-10-CM

## 2025-02-18 DIAGNOSIS — D30.3: Primary | ICD-10-CM

## 2025-02-18 RX ORDER — CEFAZOLIN SODIUM 2 G/100ML
2 INJECTION, SOLUTION INTRAVENOUS
OUTPATIENT
Start: 2025-02-18

## 2025-02-18 RX ORDER — CEFAZOLIN SODIUM 2 G/100ML
2 INJECTION, SOLUTION INTRAVENOUS SEE ADMIN INSTRUCTIONS
OUTPATIENT
Start: 2025-02-18

## 2025-02-18 RX ORDER — ACETAMINOPHEN 325 MG/1
975 TABLET ORAL ONCE
OUTPATIENT
Start: 2025-02-18 | End: 2025-02-18

## 2025-02-18 RX ORDER — ACETAMINOPHEN 650 MG/1
650 SUPPOSITORY RECTAL ONCE
OUTPATIENT
Start: 2025-02-18

## 2025-02-18 NOTE — PROGRESS NOTES
Reason for cystoscopy:GH    Brief History:  Peña Hall is a 79 year old male with a history of HTN and HLD who presents for evaluation of gross hematuria.     Note from 1/9 reviewed  The patient reports two days of gross hematuria that has resolved.   He quit smoking 10 years ago. He has a 48-50 pack year history.   He had a negative microscopic hematuria evaluation in 2010.   He has a history of radiation for rectal cancer.       CT urogram  IMPRESSION:  1.  No visible urolithiasis or hydronephrosis. No proximal collecting system abnormality identified. Please note that the bladder is obscured by streak artifact for characterization. If there is further concern for hematuria, endoscopic evaluation of the   bladder may be needed.  2.  A few noncalcified small nodules appears stable at the lower lungs, consistent with a nonaggressive etiology.    Urine cytology negative    CYSTOSCOPY  We discussed the risks and benefits of the procedure which include risk of bleeding and infection.  Informed consent was obtained, the patient was prepped and draped in the standard sterile fashion.  A flexible cystoscope was introduced through a well-lubricated urethra.     Anterior urethra strictures were absent.   The urinary sphincter was intact.  The prostate demonstrated mild lateral lobe and a small median lobe.  Bladder neck was open.   Bladder signififcant for the following:      Diverticuli: No      Cellules: No      Trabeculation: mild       Tumors: Yes      Stones:No  The ureteral orifices  were identified on each side in orthotopic position              The flexible cystoscope was removed and the findings were described to the patient.     Assessment and Plan    1`. Bladder Tumor - right lateral wall, will need TURBT in OR, will refer patient to my oncology partner Dr Tiffany Webster MD

## 2025-02-18 NOTE — NURSING NOTE
" Initial /68 (BP Location: Right arm, Patient Position: Chair, Cuff Size: Adult Regular)   Pulse 64   Resp 16  Estimated body mass index is 31.96 kg/m  as calculated from the following:    Height as of 1/19/25: 1.676 m (5' 6\").    Weight as of 1/19/25: 89.8 kg (198 lb). .  Patient is here for a cysto . States still having blood in his urine.  estrella ramirez LPN   "

## 2025-02-20 ENCOUNTER — OFFICE VISIT (OUTPATIENT)
Dept: FAMILY MEDICINE | Facility: CLINIC | Age: 80
End: 2025-02-20
Payer: COMMERCIAL

## 2025-02-20 VITALS
WEIGHT: 197.2 LBS | BODY MASS INDEX: 31.69 KG/M2 | RESPIRATION RATE: 20 BRPM | HEIGHT: 66 IN | DIASTOLIC BLOOD PRESSURE: 64 MMHG | SYSTOLIC BLOOD PRESSURE: 124 MMHG | TEMPERATURE: 97.8 F | OXYGEN SATURATION: 98 % | HEART RATE: 58 BPM

## 2025-02-20 DIAGNOSIS — N40.0 BENIGN PROSTATIC HYPERPLASIA, UNSPECIFIED WHETHER LOWER URINARY TRACT SYMPTOMS PRESENT: ICD-10-CM

## 2025-02-20 DIAGNOSIS — E78.5 HYPERLIPIDEMIA LDL GOAL <130: ICD-10-CM

## 2025-02-20 DIAGNOSIS — I10 PRIMARY HYPERTENSION: ICD-10-CM

## 2025-02-20 DIAGNOSIS — Z01.818 PREOP GENERAL PHYSICAL EXAM: Primary | ICD-10-CM

## 2025-02-20 DIAGNOSIS — J45.20 MILD INTERMITTENT ASTHMA WITHOUT COMPLICATION: ICD-10-CM

## 2025-02-20 DIAGNOSIS — D30.3 BENIGN NEOPLASM OF BLADDER: ICD-10-CM

## 2025-02-20 ASSESSMENT — PAIN SCALES - GENERAL: PAINLEVEL_OUTOF10: SEVERE PAIN (7)

## 2025-02-20 NOTE — PROGRESS NOTES
Preoperative Evaluation  [facility]  [address] [place] [id number]  Phone: [phone]  Fax: [phone]  Primary Provider: Sheeba Whitt MD  Pre-op Performing Provider: Rich Leal MD  Feb 20, 2025 2/19/2025   Surgical Information   What procedure is being done? Bladder surgery   Facility or Hospital where procedure/surgery will be performed: [facility]   Who is doing the procedure / surgery? Bryan Tiffany   Date of surgery / procedure: 2/26/25   Time of surgery / procedure: 10:15 AM   Where do you plan to recover after surgery? at home with family     Fax number for surgical facility: Note does not need to be faxed, will be available electronically in Epic.    Assessment & Plan     The proposed surgical procedure is considered LOW risk.      ICD-10-CM    1. Preop general physical exam  Z01.818       2. Benign neoplasm of bladder  D30.3       3. Mild intermittent asthma without complication  J45.20       4. Hyperlipidemia LDL goal <130  E78.5       5. Primary hypertension  I10       6. Benign prostatic hyperplasia, unspecified whether lower urinary tract symptoms present  N40.0              - No identified additional risk factors other than previously addressed    Antiplatelet or Anticoagulation Medication Instructions   - We reviewed the medication list and the patient is not on an antiplatelet or anticoagulation medications.    Additional Medication Instructions   - meloxicam (Mobic): DO NOT TAKE 10 days before surgery.     Recommendation  Approval given to proceed with proposed procedure, without further diagnostic evaluation.    Anitra Pompa is a 80 year old, presenting for the following:  Pre-Op Exam          2/20/2025     1:24 PM   Additional Questions   Roomed by Addis BELCHER LPN   Accompanied by self     HPI related to upcoming procedure:   80-year-old male presents for a preop physical exam.  Patient is scheduled to have  cystoscopy, with transurethral resection of bladder tumor on February 26, 2025.  He requires evaluation and anesthesia risk assessment prior to undergoing surgery/procedure.  Patient denies any fever, chills, sore throat, cough, shortness of breath, chest pain, palpitation, diarrhea, constipation, abdominal pain, headache or other relevant systemic symptoms.           2/19/2025   Pre-Op Questionnaire   Have you ever had a heart attack or stroke? No   Have you ever had surgery on your heart or blood vessels, such as a stent placement, a coronary artery bypass, or surgery on an artery in your head, neck, heart, or legs? No   Do you have chest pain with activity? No   Do you have a history of heart failure? No   Do you currently have a cold, bronchitis or symptoms of other infection? No   Do you have a cough, shortness of breath, or wheezing? No   Do you or anyone in your family have previous history of blood clots? (!) UNKNOWN    Do you or does anyone in your family have a serious bleeding problem such as prolonged bleeding following surgeries or cuts? No   Have you ever had problems with anemia or been told to take iron pills? No   Have you had any abnormal blood loss such as black, tarry or bloody stools? No   Have you ever had a blood transfusion? (!) UNKNOWN   Are you willing to have a blood transfusion if it is medically needed before, during, or after your surgery? Yes   Have you or any of your relatives ever had problems with anesthesia? No   Do you have sleep apnea, excessive snoring or daytime drowsiness? No   Do you have any artifical heart valves or other implanted medical devices like a pacemaker, defibrillator, or continuous glucose monitor? No   Do you have artificial joints? (!) YES   Are you allergic to latex? No     Health Care Directive  Patient has a Health Care Directive on file      Preoperative Review of    reviewed - controlled substances reflected in medication list.      Status of  Chronic Conditions:  See problem list for active medical problems.  Problems all longstanding and stable, except as noted/documented.  See ROS for pertinent symptoms related to these conditions.    Patient Active Problem List    Diagnosis Date Noted    Chronic pain of right knee 01/09/2025     Priority: Medium    Balance problem 01/09/2025     Priority: Medium    Mild intermittent asthma without complication 01/03/2024     Priority: Medium     Likely more copd picture       Tremor 01/03/2024     Priority: Medium     Improved on primidone.  Diarrhea side effect?        DDD (degenerative disc disease), lumbar 12/22/2023     Priority: Medium    Lumbar facet arthropathy 12/22/2023     Priority: Medium    Lumbar spondylolysis 12/22/2023     Priority: Medium    Abnormal CT lung screening 11/27/2023     Priority: Medium     Currently managed with follow up imaging by [facility].        Chronic diarrhea 10/22/2023     Priority: Medium     After colon resection.        Chronic midline low back pain without sciatica 10/22/2023     Priority: Medium    History of colon cancer 10/22/2023     Priority: Medium    HTN (hypertension) 10/22/2023     Priority: Medium    Hyperlipidemia LDL goal <130 10/22/2023     Priority: Medium    BPH (benign prostatic hyperplasia) 10/22/2023     Priority: Medium      Past Medical History:   Diagnosis Date    Arthritis     BPH (benign prostatic hyperplasia)     Chronic diarrhea 10/22/2023    Chronic midline low back pain without sciatica 10/22/2023    Colon cancer (H) 2000    COPD (chronic obstructive pulmonary disease) (H)     HTN (hypertension)     Hyperlipidemia LDL goal <130      Past Surgical History:   Procedure Laterality Date    ABDOMEN SURGERY  2000    APPENDECTOMY  1959    BOWEL RESECTION  2000    CHOLECYSTECTOMY  2018    COLONOSCOPY N/A 08/18/2023    Procedure: COLONOSCOPY, WITH POLYPECTOMY AND BIOPSY;  Surgeon: Marli Lyons MD;  Location: [facility]    COLONOSCOPY  N/A 2023    Procedure: COLONOSCOPY, FLEXIBLE, WITH LESION REMOVAL USING SNARE;  Surgeon: Marli Lyons MD;  Location: [facility]    EYE SURGERY  2018    ORTHOPEDIC SURGERY  2012     Current Outpatient Medications   Medication Sig Dispense Refill    albuterol (PROAIR HFA/PROVENTIL HFA/VENTOLIN HFA) 108 (90 Base) MCG/ACT inhaler Inhale 2 puff using inhaler every four hours as needed for wheezing. pretreat before activity 18 g 1    atorvastatin (LIPITOR) 20 MG tablet Take 1 tablet (20 mg) by mouth every evening. 90 tablet 3    cholecalciferol (VITAMIN D3) 125 mcg (5000 units) capsule Take 125 mcg by mouth daily      cholestyramine (QUESTRAN) 4 g packet Take 2 packets (8 g) by mouth 2 times daily (with meals) 360 packet 3    diphenhydrAMINE-acetaminophen (TYLENOL PM)  MG tablet Take 2 tablets by mouth nightly as needed for sleep      finasteride (PROSCAR) 5 MG tablet Take 1 tablet (5 mg) by mouth daily. 90 tablet 3    gabapentin (NEURONTIN) 100 MG capsule Take 1 capsule (100 mg) by mouth daily 90 capsule 3    hydroCHLOROthiazide 12.5 MG tablet Take 1 tablet (12.5 mg) by mouth daily 90 tablet 3    lisinopril (ZESTRIL) 40 MG tablet Take 1 tablet (40 mg) by mouth daily. 90 tablet 3    meloxicam (MOBIC) 7.5 MG tablet Take 1.5 tablets (11.25 mg) by mouth daily 135 tablet 3    primidone (MYSOLINE) 250 MG tablet Take 2 tablets (500 mg) by mouth every evening. 180 tablet 3    propranolol ER (INDERAL LA) 60 MG 24 hr capsule Take 1 capsule (60 mg) by mouth daily 90 capsule 3    tamsulosin (FLOMAX) 0.4 MG capsule Take 2 capsules (0.8 mg) by mouth daily. 180 capsule 3       No Known Allergies     Social History     Tobacco Use    Smoking status: Former     Current packs/day: 0.00     Average packs/day: 1 pack/day for 48.0 years (48.0 ttl pk-yrs)     Types: Cigarettes     Start date: 1970     Quit date: 2016     Years since quittin.8    Smokeless tobacco: Never   Substance Use Topics    Alcohol  "use: Yes     Comment: rare     Family History   Problem Relation Age of Onset    Diabetes Sister     Colon Cancer Other         Aunt     History   Drug Use Unknown             Review of Systems  Constitutional, neuro, ENT, endocrine, pulmonary, cardiac, gastrointestinal, genitourinary, musculoskeletal, integument and psychiatric systems are negative, except as otherwise noted.    Objective    /64   Pulse 58   Temp 97.8  F (36.6  C) (Tympanic)   Resp 20   Ht 1.676 m (5' 6\")   Wt 89.4 kg (197 lb 3.2 oz)   SpO2 98%   BMI 31.83 kg/m     Estimated body mass index is 31.83 kg/m  as calculated from the following:    Height as of this encounter: 1.676 m (5' 6\").    Weight as of this encounter: 89.4 kg (197 lb 3.2 oz).  Physical Exam  GENERAL: alert and no distress  NECK: no adenopathy, no asymmetry, masses, or scars  RESP: lungs clear to auscultation - no rales, rhonchi or wheezes  CV: regular rate and rhythm, normal S1 S2, no S3 or S4, no murmur, click or rub, no peripheral edema  ABDOMEN: soft, nontender, no hepatosplenomegaly, no masses and bowel sounds normal  MS: no gross musculoskeletal defects noted, no edema  SKIN: no suspicious lesions or rashes  NEURO: Normal strength and tone, mentation intact and speech normal  PSYCH: mentation appears normal, affect normal/bright    Recent Labs   Lab Test 01/19/25  1720 12/23/24  0757 09/09/24  0857   HGB 15.2 14.9 13.9    181  --      --  140   POTASSIUM 3.6  --  3.7   CR 0.70  --  0.88        Diagnostics  No labs were ordered during this visit.   No EKG required, no history of coronary heart disease, significant arrhythmia, peripheral arterial disease or other structural heart disease.    Revised Cardiac Risk Index (RCRI)  The patient has the following serious cardiovascular risks for perioperative complications:   - No serious cardiac risks = 0 points     RCRI Interpretation: 0 points: Class I (very low risk - 0.4% complication rate)         Signed " Electronically by: Rich Leal MD  A copy of this evaluation report is provided to the requesting physician.

## 2025-02-26 ENCOUNTER — HOSPITAL ENCOUNTER (OUTPATIENT)
Facility: HOSPITAL | Age: 80
Discharge: HOME | End: 2025-02-26
Attending: STUDENT IN AN ORGANIZED HEALTH CARE EDUCATION/TRAINING PROGRAM | Admitting: STUDENT IN AN ORGANIZED HEALTH CARE EDUCATION/TRAINING PROGRAM
Payer: COMMERCIAL

## 2025-02-26 ENCOUNTER — ANESTHESIA (OUTPATIENT)
Dept: SURGERY | Facility: HOSPITAL | Age: 80
End: 2025-02-26
Payer: COMMERCIAL

## 2025-02-26 ENCOUNTER — ANESTHESIA EVENT (OUTPATIENT)
Dept: SURGERY | Facility: HOSPITAL | Age: 80
End: 2025-02-26
Payer: COMMERCIAL

## 2025-02-26 VITALS
HEART RATE: 68 BPM | OXYGEN SATURATION: 93 % | DIASTOLIC BLOOD PRESSURE: 86 MMHG | BODY MASS INDEX: 31.94 KG/M2 | SYSTOLIC BLOOD PRESSURE: 183 MMHG | RESPIRATION RATE: 18 BRPM | WEIGHT: 197.9 LBS | TEMPERATURE: 97.2 F

## 2025-02-26 DIAGNOSIS — C67.2 MALIGNANT NEOPLASM OF LATERAL WALL OF URINARY BLADDER (H): Primary | ICD-10-CM

## 2025-02-26 PROCEDURE — 250N000025 HC SEVOFLURANE, PER MIN: Performed by: STUDENT IN AN ORGANIZED HEALTH CARE EDUCATION/TRAINING PROGRAM

## 2025-02-26 PROCEDURE — 250N000009 HC RX 250: Performed by: REGISTERED NURSE

## 2025-02-26 PROCEDURE — 52235 CYSTOSCOPY AND TREATMENT: CPT | Performed by: STUDENT IN AN ORGANIZED HEALTH CARE EDUCATION/TRAINING PROGRAM

## 2025-02-26 PROCEDURE — 250N000009 HC RX 250: Performed by: STUDENT IN AN ORGANIZED HEALTH CARE EDUCATION/TRAINING PROGRAM

## 2025-02-26 PROCEDURE — 250N000011 HC RX IP 250 OP 636: Performed by: STUDENT IN AN ORGANIZED HEALTH CARE EDUCATION/TRAINING PROGRAM

## 2025-02-26 PROCEDURE — 258N000001 HC RX 258: Performed by: STUDENT IN AN ORGANIZED HEALTH CARE EDUCATION/TRAINING PROGRAM

## 2025-02-26 PROCEDURE — 360N000076 HC SURGERY LEVEL 3, PER MIN: Performed by: STUDENT IN AN ORGANIZED HEALTH CARE EDUCATION/TRAINING PROGRAM

## 2025-02-26 PROCEDURE — 999N000141 HC STATISTIC PRE-PROCEDURE NURSING ASSESSMENT: Performed by: STUDENT IN AN ORGANIZED HEALTH CARE EDUCATION/TRAINING PROGRAM

## 2025-02-26 PROCEDURE — 258N000003 HC RX IP 258 OP 636: Performed by: REGISTERED NURSE

## 2025-02-26 PROCEDURE — 250N000011 HC RX IP 250 OP 636: Performed by: REGISTERED NURSE

## 2025-02-26 PROCEDURE — 250N000013 HC RX MED GY IP 250 OP 250 PS 637: Performed by: STUDENT IN AN ORGANIZED HEALTH CARE EDUCATION/TRAINING PROGRAM

## 2025-02-26 PROCEDURE — 272N000001 HC OR GENERAL SUPPLY STERILE: Performed by: STUDENT IN AN ORGANIZED HEALTH CARE EDUCATION/TRAINING PROGRAM

## 2025-02-26 PROCEDURE — 370N000017 HC ANESTHESIA TECHNICAL FEE, PER MIN: Performed by: STUDENT IN AN ORGANIZED HEALTH CARE EDUCATION/TRAINING PROGRAM

## 2025-02-26 PROCEDURE — 710N000009 HC RECOVERY PHASE 1, LEVEL 1, PER MIN: Performed by: STUDENT IN AN ORGANIZED HEALTH CARE EDUCATION/TRAINING PROGRAM

## 2025-02-26 PROCEDURE — 710N000012 HC RECOVERY PHASE 2, PER MINUTE: Performed by: STUDENT IN AN ORGANIZED HEALTH CARE EDUCATION/TRAINING PROGRAM

## 2025-02-26 PROCEDURE — 88305 TISSUE EXAM BY PATHOLOGIST: CPT | Mod: TC | Performed by: STUDENT IN AN ORGANIZED HEALTH CARE EDUCATION/TRAINING PROGRAM

## 2025-02-26 PROCEDURE — 250N000013 HC RX MED GY IP 250 OP 250 PS 637: Performed by: ANESTHESIOLOGY

## 2025-02-26 RX ORDER — ONDANSETRON 2 MG/ML
4 INJECTION INTRAMUSCULAR; INTRAVENOUS EVERY 30 MIN PRN
Status: DISCONTINUED | OUTPATIENT
Start: 2025-02-26 | End: 2025-02-26 | Stop reason: HOSPADM

## 2025-02-26 RX ORDER — DEXAMETHASONE SODIUM PHOSPHATE 10 MG/ML
4 INJECTION, SOLUTION INTRAMUSCULAR; INTRAVENOUS
Status: DISCONTINUED | OUTPATIENT
Start: 2025-02-26 | End: 2025-02-26 | Stop reason: HOSPADM

## 2025-02-26 RX ORDER — NALOXONE HYDROCHLORIDE 0.4 MG/ML
0.1 INJECTION, SOLUTION INTRAMUSCULAR; INTRAVENOUS; SUBCUTANEOUS
Status: DISCONTINUED | OUTPATIENT
Start: 2025-02-26 | End: 2025-02-26 | Stop reason: HOSPADM

## 2025-02-26 RX ORDER — ACETAMINOPHEN 650 MG/1
650 SUPPOSITORY RECTAL ONCE
Status: COMPLETED | OUTPATIENT
Start: 2025-02-26 | End: 2025-02-26

## 2025-02-26 RX ORDER — ATROPA BELLADONNA AND OPIUM 16.2; 3 MG/1; MG/1
30 SUPPOSITORY RECTAL ONCE
Status: DISCONTINUED | OUTPATIENT
Start: 2025-02-26 | End: 2025-02-26 | Stop reason: HOSPADM

## 2025-02-26 RX ORDER — ONDANSETRON 4 MG/1
4 TABLET, ORALLY DISINTEGRATING ORAL EVERY 30 MIN PRN
Status: DISCONTINUED | OUTPATIENT
Start: 2025-02-26 | End: 2025-02-26 | Stop reason: HOSPADM

## 2025-02-26 RX ORDER — OXYCODONE HYDROCHLORIDE 5 MG/1
5 TABLET ORAL
Status: COMPLETED | OUTPATIENT
Start: 2025-02-26 | End: 2025-02-26

## 2025-02-26 RX ORDER — FENTANYL CITRATE 50 UG/ML
INJECTION, SOLUTION INTRAMUSCULAR; INTRAVENOUS PRN
Status: DISCONTINUED | OUTPATIENT
Start: 2025-02-26 | End: 2025-02-26

## 2025-02-26 RX ORDER — HYDROMORPHONE HCL IN WATER/PF 6 MG/30 ML
0.2 PATIENT CONTROLLED ANALGESIA SYRINGE INTRAVENOUS EVERY 5 MIN PRN
Status: DISCONTINUED | OUTPATIENT
Start: 2025-02-26 | End: 2025-02-26 | Stop reason: HOSPADM

## 2025-02-26 RX ORDER — CEFAZOLIN SODIUM/WATER 2 G/20 ML
2 SYRINGE (ML) INTRAVENOUS
Status: COMPLETED | OUTPATIENT
Start: 2025-02-26 | End: 2025-02-26

## 2025-02-26 RX ORDER — ONDANSETRON 2 MG/ML
INJECTION INTRAMUSCULAR; INTRAVENOUS PRN
Status: DISCONTINUED | OUTPATIENT
Start: 2025-02-26 | End: 2025-02-26

## 2025-02-26 RX ORDER — OXYCODONE HYDROCHLORIDE 5 MG/1
10 TABLET ORAL
Status: DISCONTINUED | OUTPATIENT
Start: 2025-02-26 | End: 2025-02-26 | Stop reason: HOSPADM

## 2025-02-26 RX ORDER — MAGNESIUM HYDROXIDE 1200 MG/15ML
LIQUID ORAL PRN
Status: DISCONTINUED | OUTPATIENT
Start: 2025-02-26 | End: 2025-02-26 | Stop reason: HOSPADM

## 2025-02-26 RX ORDER — DEXAMETHASONE SODIUM PHOSPHATE 10 MG/ML
INJECTION, SOLUTION INTRAMUSCULAR; INTRAVENOUS PRN
Status: DISCONTINUED | OUTPATIENT
Start: 2025-02-26 | End: 2025-02-26

## 2025-02-26 RX ORDER — AMOXICILLIN 250 MG
1-2 CAPSULE ORAL 2 TIMES DAILY
Qty: 30 TABLET | Refills: 0 | Status: SHIPPED | OUTPATIENT
Start: 2025-02-26

## 2025-02-26 RX ORDER — LIDOCAINE HYDROCHLORIDE 10 MG/ML
INJECTION, SOLUTION INFILTRATION; PERINEURAL PRN
Status: DISCONTINUED | OUTPATIENT
Start: 2025-02-26 | End: 2025-02-26

## 2025-02-26 RX ORDER — HYDROMORPHONE HCL IN WATER/PF 6 MG/30 ML
0.4 PATIENT CONTROLLED ANALGESIA SYRINGE INTRAVENOUS EVERY 5 MIN PRN
Status: DISCONTINUED | OUTPATIENT
Start: 2025-02-26 | End: 2025-02-26 | Stop reason: HOSPADM

## 2025-02-26 RX ORDER — ACETAMINOPHEN 325 MG/1
650 TABLET ORAL ONCE
Status: DISCONTINUED | OUTPATIENT
Start: 2025-02-26 | End: 2025-02-26 | Stop reason: HOSPADM

## 2025-02-26 RX ORDER — ONDANSETRON 4 MG/1
4 TABLET, ORALLY DISINTEGRATING ORAL EVERY 8 HOURS PRN
Qty: 4 TABLET | Refills: 0 | Status: SHIPPED | OUTPATIENT
Start: 2025-02-26

## 2025-02-26 RX ORDER — EPHEDRINE SULFATE 50 MG/ML
INJECTION, SOLUTION INTRAMUSCULAR; INTRAVENOUS; SUBCUTANEOUS PRN
Status: DISCONTINUED | OUTPATIENT
Start: 2025-02-26 | End: 2025-02-26

## 2025-02-26 RX ORDER — ACETAMINOPHEN 325 MG/1
975 TABLET ORAL ONCE
Status: COMPLETED | OUTPATIENT
Start: 2025-02-26 | End: 2025-02-26

## 2025-02-26 RX ORDER — CEFAZOLIN SODIUM/WATER 2 G/20 ML
2 SYRINGE (ML) INTRAVENOUS SEE ADMIN INSTRUCTIONS
Status: DISCONTINUED | OUTPATIENT
Start: 2025-02-26 | End: 2025-02-26 | Stop reason: HOSPADM

## 2025-02-26 RX ORDER — FENTANYL CITRATE 50 UG/ML
50 INJECTION, SOLUTION INTRAMUSCULAR; INTRAVENOUS EVERY 5 MIN PRN
Status: DISCONTINUED | OUTPATIENT
Start: 2025-02-26 | End: 2025-02-26 | Stop reason: HOSPADM

## 2025-02-26 RX ORDER — FENTANYL CITRATE 50 UG/ML
25 INJECTION, SOLUTION INTRAMUSCULAR; INTRAVENOUS EVERY 5 MIN PRN
Status: DISCONTINUED | OUTPATIENT
Start: 2025-02-26 | End: 2025-02-26 | Stop reason: HOSPADM

## 2025-02-26 RX ORDER — SODIUM CHLORIDE, SODIUM LACTATE, POTASSIUM CHLORIDE, CALCIUM CHLORIDE 600; 310; 30; 20 MG/100ML; MG/100ML; MG/100ML; MG/100ML
INJECTION, SOLUTION INTRAVENOUS CONTINUOUS PRN
Status: DISCONTINUED | OUTPATIENT
Start: 2025-02-26 | End: 2025-02-26

## 2025-02-26 RX ORDER — PROPOFOL 10 MG/ML
INJECTION, EMULSION INTRAVENOUS PRN
Status: DISCONTINUED | OUTPATIENT
Start: 2025-02-26 | End: 2025-02-26

## 2025-02-26 RX ORDER — SODIUM CHLORIDE, SODIUM LACTATE, POTASSIUM CHLORIDE, CALCIUM CHLORIDE 600; 310; 30; 20 MG/100ML; MG/100ML; MG/100ML; MG/100ML
INJECTION, SOLUTION INTRAVENOUS CONTINUOUS
Status: DISCONTINUED | OUTPATIENT
Start: 2025-02-26 | End: 2025-02-26 | Stop reason: HOSPADM

## 2025-02-26 RX ORDER — DEXAMETHASONE SODIUM PHOSPHATE 4 MG/ML
4 INJECTION, SOLUTION INTRA-ARTICULAR; INTRALESIONAL; INTRAMUSCULAR; INTRAVENOUS; SOFT TISSUE
Status: DISCONTINUED | OUTPATIENT
Start: 2025-02-26 | End: 2025-02-26 | Stop reason: HOSPADM

## 2025-02-26 RX ADMIN — PROPOFOL 50 MG: 10 INJECTION, EMULSION INTRAVENOUS at 10:53

## 2025-02-26 RX ADMIN — FENTANYL CITRATE 50 MCG: 50 INJECTION, SOLUTION INTRAMUSCULAR; INTRAVENOUS at 10:36

## 2025-02-26 RX ADMIN — DEXAMETHASONE SODIUM PHOSPHATE 5 MG: 10 INJECTION, SOLUTION INTRAMUSCULAR; INTRAVENOUS at 10:45

## 2025-02-26 RX ADMIN — ACETAMINOPHEN 975 MG: 325 TABLET ORAL at 09:40

## 2025-02-26 RX ADMIN — ROCURONIUM 50 MG: 50 INJECTION, SOLUTION INTRAVENOUS at 10:48

## 2025-02-26 RX ADMIN — ONDANSETRON 4 MG: 2 INJECTION INTRAMUSCULAR; INTRAVENOUS at 10:45

## 2025-02-26 RX ADMIN — Medication 5 MG: at 11:00

## 2025-02-26 RX ADMIN — OXYCODONE HYDROCHLORIDE 5 MG: 5 TABLET ORAL at 12:54

## 2025-02-26 RX ADMIN — Medication 2 G: at 10:26

## 2025-02-26 RX ADMIN — FENTANYL CITRATE 50 MCG: 50 INJECTION, SOLUTION INTRAMUSCULAR; INTRAVENOUS at 10:52

## 2025-02-26 RX ADMIN — SODIUM CHLORIDE, POTASSIUM CHLORIDE, SODIUM LACTATE AND CALCIUM CHLORIDE: 600; 310; 30; 20 INJECTION, SOLUTION INTRAVENOUS at 10:26

## 2025-02-26 RX ADMIN — LIDOCAINE HYDROCHLORIDE 5 ML: 10 INJECTION, SOLUTION INFILTRATION; PERINEURAL at 10:36

## 2025-02-26 RX ADMIN — PROPOFOL 150 MG: 10 INJECTION, EMULSION INTRAVENOUS at 10:36

## 2025-02-26 RX ADMIN — SUGAMMADEX 200 MG: 100 INJECTION, SOLUTION INTRAVENOUS at 11:09

## 2025-02-26 ASSESSMENT — ACTIVITIES OF DAILY LIVING (ADL)
ADLS_ACUITY_SCORE: 53
ADLS_ACUITY_SCORE: 38
ADLS_ACUITY_SCORE: 38
ADLS_ACUITY_SCORE: 36
ADLS_ACUITY_SCORE: 36

## 2025-02-26 ASSESSMENT — COPD QUESTIONNAIRES: COPD: 1

## 2025-02-26 NOTE — H&P
Urology H and P Update    I have evaluated Peña Hall  today and examined him. He does not note any significant issues since her last evaluation. Based on my evaluation, he is fit to proceed ahead with the planned procedure.    Bryan Allen MD

## 2025-02-26 NOTE — ANESTHESIA POSTPROCEDURE EVALUATION
Patient: Peña Hall    Procedure: Procedure(s):  CYSTOSCOPY, WITH TRANSURETHRAL RESECTION BLADDER TUMOR       Anesthesia Type:  General    Note:  Disposition: Outpatient   Postop Pain Control: Uneventful            Sign Out: Well controlled pain   PONV: No   Neuro/Psych: Uneventful            Sign Out: Acceptable/Baseline neuro status   Airway/Respiratory: Uneventful            Sign Out: Acceptable/Baseline resp. status   CV/Hemodynamics: Uneventful            Sign Out: Acceptable CV status; No obvious hypovolemia; No obvious fluid overload   Other NRE: NONE   DID A NON-ROUTINE EVENT OCCUR? No           Last vitals:  Vitals Value Taken Time   /84 02/26/25 1215   Temp 36.1  C (96.98  F) 02/26/25 1219   Pulse 52 02/26/25 1219   Resp 10 02/26/25 1219   SpO2 95 % 02/26/25 1219   Vitals shown include unfiled device data.    Electronically Signed By: Apollo Sharma MD  February 26, 2025  12:23 PM

## 2025-02-26 NOTE — ANESTHESIA CARE TRANSFER NOTE
Patient: Peña Hall    Procedure: Procedure(s):  CYSTOSCOPY, WITH TRANSURETHRAL RESECTION BLADDER TUMOR       Diagnosis: Benign neoplasm of posterior wall of urinary bladder [D30.3]  Diagnosis Additional Information: No value filed.    Anesthesia Type:   General     Note:    Oropharynx: oropharynx clear of all foreign objects  Level of Consciousness: awake  Oxygen Supplementation: face mask  Level of Supplemental Oxygen (L/min / FiO2): 10  Independent Airway: airway patency satisfactory and stable  Dentition: dentition unchanged  Vital Signs Stable: post-procedure vital signs reviewed and stable  Report to RN Given: handoff report given  Patient transferred to: PACU    Handoff Report: Identifed the Patient, Identified the Reponsible Provider, Reviewed the pertinent medical history, Discussed the surgical course, Reviewed Intra-OP anesthesia mangement and issues during anesthesia, Set expectations for post-procedure period and Allowed opportunity for questions and acknowledgement of understanding      Vitals:  Vitals Value Taken Time   /86 02/26/25 1120   Temp 36  C (96.8  F) 02/26/25 1120   Pulse 66 02/26/25 1120   Resp 11 02/26/25 1120   SpO2 97 % 02/26/25 1120   Vitals shown include unfiled device data.    Electronically Signed By: YECENIA Fu CRNA  February 26, 2025  11:22 AM

## 2025-02-26 NOTE — OP NOTE
OPERATIVE REPORT    PREOPERATIVE DIAGNOSIS:   1) Bladder tumor      POSTOPERATIVE DIAGNOSIS:  Same    PROCEDURE PERFORMED: Transurethral resection of Bladder Tumor 2 cm to 5 cm     ATTENDING SURGEON: Bryan Allen MD  FINDINGS:  2  cm tumor located on the  right lateral wall of the bladder      ANESTHESIA: General   INTRAVENOUS FLUIDS: See dictated anesthesia record  ESTIMATED BLOOD LOSS: 2 mL.     SPECIMENS:   1. Bladder tumor chips  2. Base of bladder tumor    DRAINS:   16-[place] 2-way catheter.       INDICATIONS FOR PROCEDURE: Peña Hall is a(n) 80 year old male who was seen in consultation for bladder tumor. After discussion of the risks, benefits and alternatives of the procedure, the patient agreed to proceed with transurethral resection of his bladder tumor.     DESCRIPTION OF PROCEDURE: After verifying informed consent, the patient was taken to the operating room. Adequate anesthesia was induced, the patient was placed in the dorsal lithotomy position and prepped and draped in standard sterile fashion. A timeout was performed to verify correct patient and procedure. Pneumo boots and perioperative antibiotics were in place before the procedure commenced.     A 22-[place] rigid cystoscope was inserted into a well lubricated urethra. We began by performing a white light cystourethroscopy. The urethra was unremarkable. The ureteral orifices were in their orthotopic positions bilaterally.  Multiple Bladder tumors  appeared on the right lateral wall and measured approximately 2 cm in aggregate  and was located on the right lateral wall lateral to the right ureteral orifice.    We then introduced the 26-[place] bipolar Gyrus resectoscope. We resected tissue down to muscle  using cut electrocautery. Once we had completed our dissection, we evacuated the specimens. We then reintroduced the resectoscope to ensure meticulous hemostasis.     A 26-[place] 2-way catheter was placed, and 10 mL was instilled into the  balloon. Catheter was hooked up to continuous irrigation which was clear;  the patient was awoken from anesthesia and transferred to the recovery room in stable condition.     POSTOPERATIVE PLAN:   1. Follow up in 1-2 weeks with me in clinic to review pathology  2. Shaffer cathter to be removed in clinic after 24 hours    Bryan Allen MD

## 2025-02-26 NOTE — ANESTHESIA PREPROCEDURE EVALUATION
Anesthesia Pre-Procedure Evaluation    Patient: Peña Hall   MRN: [id number] : 1945        Procedure : Procedure(s):  CYSTOSCOPY, WITH TRANSURETHRAL RESECTION BLADDER TUMOR          Past Medical History:   Diagnosis Date    Arthritis     Asthma     Balance problems     Bladder tumor     BPH (benign prostatic hyperplasia)     Chronic diarrhea 10/22/2023    Chronic midline low back pain without sciatica 10/22/2023    Chronic pain of right knee     Colon cancer (H)     COPD (chronic obstructive pulmonary disease) (H)     DDD (degenerative disc disease), lumbar     HTN (hypertension)     Hyperlipidemia LDL goal <130     Lumbar facet arthropathy     Lumbar spondylolysis     Tremor       Past Surgical History:   Procedure Laterality Date    ABDOMEN SURGERY      APPENDECTOMY      BOWEL RESECTION      CHOLECYSTECTOMY      COLONOSCOPY N/A 2023    Procedure: COLONOSCOPY, WITH POLYPECTOMY AND BIOPSY;  Surgeon: Marli Lyons MD;  Location: [facility]    COLONOSCOPY N/A 2023    Procedure: COLONOSCOPY, FLEXIBLE, WITH LESION REMOVAL USING SNARE;  Surgeon: Marli Lyons MD;  Location: [facility]    EYE SURGERY      ORTHOPEDIC SURGERY  2012      No Known Allergies   Social History     Tobacco Use    Smoking status: Former     Current packs/day: 0.00     Average packs/day: 1 pack/day for 48.0 years (48.0 ttl pk-yrs)     Types: Cigarettes     Start date: 1970     Quit date: 2016     Years since quittin.9    Smokeless tobacco: Never   Substance Use Topics    Alcohol use: Yes     Comment: rare      Wt Readings from Last 1 Encounters:   25 89.8 kg (197 lb 14.4 oz)        Anesthesia Evaluation            ROS/MED HX  ENT/Pulmonary:     (+)                      asthma    COPD,              Neurologic:       Cardiovascular:     (+)  hypertension- -   -  - -                                      METS/Exercise Tolerance:     Hematologic:       Musculoskeletal:      "  GI/Hepatic:       Renal/Genitourinary:       Endo:     (+)               Obesity,       Psychiatric/Substance Use:       Infectious Disease:       Malignancy:   (+) Malignancy, History of Other.    Other:            Physical Exam    Airway        Mallampati: II    Neck ROM: full     Respiratory Devices and Support         Dental           Cardiovascular   cardiovascular exam normal          Pulmonary   pulmonary exam normal                OUTSIDE LABS:  CBC:   Lab Results   Component Value Date    WBC 8.6 01/19/2025    WBC 5.1 12/23/2024    HGB 15.2 01/19/2025    HGB 14.9 12/23/2024    HCT 44.6 01/19/2025    HCT 44.4 12/23/2024     01/19/2025     12/23/2024     BMP:   Lab Results   Component Value Date     01/19/2025     09/09/2024    POTASSIUM 3.6 01/19/2025    POTASSIUM 3.7 09/09/2024    CHLORIDE 103 01/19/2025    CHLORIDE 102 09/09/2024    CO2 26 01/19/2025    CO2 29 09/09/2024    BUN 13.4 01/19/2025    BUN 14.8 09/09/2024    CR 0.70 01/19/2025    CR 0.88 09/09/2024     (H) 01/19/2025    GLC 91 09/09/2024     COAGS: No results found for: \"PTT\", \"INR\", \"FIBR\"  POC: No results found for: \"BGM\", \"HCG\", \"HCGS\"  HEPATIC:   Lab Results   Component Value Date    ALBUMIN 4.3 01/19/2025    PROTTOTAL 6.9 01/19/2025    ALT 30 01/19/2025    AST 34 01/19/2025    ALKPHOS 78 01/19/2025    BILITOTAL 0.3 01/19/2025     OTHER:   Lab Results   Component Value Date    LACT 0.9 01/19/2025    SARAVANAN 9.0 01/19/2025    MAG 2.0 10/23/2023    LIPASE 22 01/19/2025       Anesthesia Plan    ASA Status:  3       Anesthesia Type: General.     - Airway: LMA   Induction: Intravenous, Propofol.   Maintenance: Balanced.        Consents    Anesthesia Plan(s) and associated risks, benefits, and realistic alternatives discussed. Questions answered and patient/representative(s) expressed understanding.     - Discussed:     - Discussed with:  Patient            Postoperative Care    Pain management: Multi-modal " "analgesia.   PONV prophylaxis: Ondansetron (or other 5HT-3), Dexamethasone or Solumedrol     Comments:               Apollo Sharma MD    I have reviewed the pertinent notes and labs in the chart from the past 30 days and (re)examined the patient.  Any updates or changes from those notes are reflected in this note.    Clinically Significant Risk Factors Present on Admission                   # Hypertension: Noted on problem list           # Obesity: Estimated body mass index is 31.94 kg/m  as calculated from the following:    Height as of 2/20/25: 1.676 m (5' 6\").    Weight as of this encounter: 89.8 kg (197 lb 14.4 oz).       # Asthma: noted on problem list          "

## 2025-02-26 NOTE — ANESTHESIA PROCEDURE NOTES
Airway       Patient location during procedure: OR       Procedure Start/Stop Times: 2/26/2025 10:52 AM  Staff -        CRNA: Savanah Rascon APRN CRNA       Performed By: CRNA  Consent for Airway        Urgency: elective  Indications and Patient Condition       Indications for airway management: emeli-procedural       Induction type:intravenous       Mask difficulty assessment: 1 - vent by mask    Final Airway Details       Final airway type: endotracheal airway       Successful airway: ETT - single and Oral  Endotracheal Airway Details        ETT size (mm): 7.5       Cuffed: yes       Successful intubation technique: video laryngoscopy       VL Blade Size: Glidescope 4       Grade View of Cords: 1       Adjucts: stylet       Position: Right       Measured from: lips       Secured at (cm): 24       Bite block used: None    Post intubation assessment        Placement verified by: capnometry, equal breath sounds and chest rise        Number of attempts at approach: 1       Number of other approaches attempted: 0       Secured with: commercial tube oquendo and tape       Ease of procedure: easy       Dentition: Intact       Dental guard used and removed.    Medication(s) Administered   Medication Administration Time: 2/26/2025 10:52 AM

## 2025-02-27 LAB
PATH REPORT.COMMENTS IMP SPEC: NORMAL
PATH REPORT.COMMENTS IMP SPEC: NORMAL
PATH REPORT.FINAL DX SPEC: NORMAL
PATH REPORT.GROSS SPEC: NORMAL
PATH REPORT.MICROSCOPIC SPEC OTHER STN: NORMAL
PATH REPORT.RELEVANT HX SPEC: NORMAL
PATHOLOGY SYNOPTIC REPORT: NORMAL
PHOTO IMAGE: NORMAL

## 2025-02-27 PROCEDURE — 88307 TISSUE EXAM BY PATHOLOGIST: CPT | Mod: 26 | Performed by: PATHOLOGY

## 2025-02-27 PROCEDURE — 88305 TISSUE EXAM BY PATHOLOGIST: CPT | Mod: 26 | Performed by: PATHOLOGY

## 2025-03-12 ENCOUNTER — OFFICE VISIT (OUTPATIENT)
Dept: UROLOGY | Facility: CLINIC | Age: 80
End: 2025-03-12
Payer: COMMERCIAL

## 2025-03-12 VITALS — SYSTOLIC BLOOD PRESSURE: 159 MMHG | HEART RATE: 58 BPM | TEMPERATURE: 97.8 F | DIASTOLIC BLOOD PRESSURE: 76 MMHG

## 2025-03-12 DIAGNOSIS — C67.8 MALIGNANT NEOPLASM OF OVERLAPPING SITES OF BLADDER (H): Primary | ICD-10-CM

## 2025-03-12 PROCEDURE — 3077F SYST BP >= 140 MM HG: CPT | Performed by: STUDENT IN AN ORGANIZED HEALTH CARE EDUCATION/TRAINING PROGRAM

## 2025-03-12 PROCEDURE — 99213 OFFICE O/P EST LOW 20 MIN: CPT | Performed by: STUDENT IN AN ORGANIZED HEALTH CARE EDUCATION/TRAINING PROGRAM

## 2025-03-12 PROCEDURE — 3078F DIAST BP <80 MM HG: CPT | Performed by: STUDENT IN AN ORGANIZED HEALTH CARE EDUCATION/TRAINING PROGRAM

## 2025-03-12 NOTE — PATIENT INSTRUCTIONS
Follow-up in 3 months for surveillance and cysto can be done with Dr Webster  IF 3 month cysto is negative next cysto at 9 months and then yearly there after

## 2025-03-12 NOTE — PROGRESS NOTES
CHIEF COMPLAINT   It was my pleasure to see Peña Hall who is a 80 year old male for follow-up of transurethral resection of bladder tumor.      HPI:  Peña Hall is a 80 year old male being seen for follow-up and pathology discussion.  Duration of problem: 1 month  Previous treatments: TURBT 2 weeks ago     accompanied by his daughter  Reviewed previous notes  Doing well  Denies any hematuria or other issues      Exam:  BP (!) 159/76 (BP Location: Right arm, Patient Position: Sitting, Cuff Size: Adult Large)   Pulse 58   Temp 97.8  F (36.6  C) (Tympanic)   General: age-appropriate appearing male in NAD sitting in an exam chair  Resp: no respiratory distress  CV: heart rate regular  Abdomen: Degree of obesity is mild. Abdomen is soft and nontender. No organomegaly.   : not performed  Neuro: grossly non focal. Normal reflexes  Motor: excellent strength throughout    Review of Imaging:  The following imaging exams were independently viewed and interpreted by me and discussed with patient:      Review of Labs:  The following labs were reviewed by me and discussed with the patient:  Final Diagnosis   A) BLADDER TUMOR, TRANSURETHRAL RESECTION:        -  SEE FULL SYNOPTIC REPORT        -  SUMMARY OF SYNOPSIS:              -  NONINVASIVE PAPILLARY UROTHELIAL CARCINOMA              -  TUMOR IS LOW-GRADE              -  NEGATIVE FOR LYMPHATIC OR VASCULAR INVASION              -  NORMAL MUSCULARIS PROPRIA PRESENT IN BIOPSY     B) BASE OF URINARY BLADDER TUMOR:        -  NORMAL MUSCULARIS PROPRIA AND FIBROUS TISSUE        -  SMALL FRAGMENT OF NORMAL UROTHELIUM       Assessment & Plan     Malignant neoplasm of overlapping sites of bladder (H)  Low-grade bladder cancer on biopsy  Recommend surveillance cystoscopy  Recommended schedule should be at 3 months, 12 months and yearly thereafter  Since he lives close to [place] he can get this with Dr. Webster  He can see me as needed    Bryan Allen MD  [facility]  [facility]      ==========================    Additional Billing and Coding Information:  Review of external notes as documented above   Review of the result(s) of each unique test - surgical path                12 minutes spent by me on the date of the encounter doing chart review, review of test results, interpretation of tests, patient visit, documentation, and discussion with family     ==========================

## 2025-03-15 ENCOUNTER — HEALTH MAINTENANCE LETTER (OUTPATIENT)
Age: 80
End: 2025-03-15

## 2025-03-25 ENCOUNTER — MYC MEDICAL ADVICE (OUTPATIENT)
Dept: UROLOGY | Facility: CLINIC | Age: 80
End: 2025-03-25
Payer: COMMERCIAL

## 2025-03-25 ENCOUNTER — MYC MEDICAL ADVICE (OUTPATIENT)
Dept: FAMILY MEDICINE | Facility: CLINIC | Age: 80
End: 2025-03-25
Payer: COMMERCIAL

## 2025-03-25 ENCOUNTER — TELEPHONE (OUTPATIENT)
Dept: FAMILY MEDICINE | Facility: CLINIC | Age: 80
End: 2025-03-25
Payer: COMMERCIAL

## 2025-03-25 DIAGNOSIS — K52.9 CHRONIC DIARRHEA: Chronic | ICD-10-CM

## 2025-03-25 DIAGNOSIS — K52.9 CHRONIC DIARRHEA: Primary | ICD-10-CM

## 2025-03-25 RX ORDER — CHOLESTYRAMINE 4 G/9G
8 POWDER, FOR SUSPENSION ORAL 2 TIMES DAILY WITH MEALS
Qty: 1440 G | Refills: 3 | Status: SHIPPED | OUTPATIENT
Start: 2025-03-25 | End: 2025-03-26 | Stop reason: ALTCHOICE

## 2025-03-26 RX ORDER — CHOLESTYRAMINE 4 G/9G
1 POWDER, FOR SUSPENSION ORAL 2 TIMES DAILY WITH MEALS
Qty: 60 PACKET | Refills: 11 | Status: SHIPPED | OUTPATIENT
Start: 2025-03-26

## 2025-03-26 NOTE — TELEPHONE ENCOUNTER
Cost of Cholestyramine is too much for patient. Let pharmacy know if there is other medications you would like us to run for test claim.     Thank you,    Lesa Luciano, PharmD  [facility]- [address] [address]  Phone: [phone]  [email or web address]

## 2025-04-22 DIAGNOSIS — I10 HYPERTENSION, UNSPECIFIED TYPE: ICD-10-CM

## 2025-04-22 RX ORDER — HYDROCHLOROTHIAZIDE 12.5 MG/1
12.5 TABLET ORAL DAILY
Qty: 90 TABLET | Refills: 0 | Status: SHIPPED | OUTPATIENT
Start: 2025-04-22

## 2025-04-25 ENCOUNTER — APPOINTMENT (OUTPATIENT)
Dept: CT IMAGING | Facility: CLINIC | Age: 80
DRG: 372 | End: 2025-04-25
Attending: NURSE PRACTITIONER
Payer: COMMERCIAL

## 2025-04-25 ENCOUNTER — HOSPITAL ENCOUNTER (INPATIENT)
Facility: CLINIC | Age: 80
LOS: 3 days | Discharge: HOME | DRG: 372 | End: 2025-04-28
Attending: NURSE PRACTITIONER | Admitting: INTERNAL MEDICINE
Payer: COMMERCIAL

## 2025-04-25 DIAGNOSIS — K56.609 SMALL BOWEL OBSTRUCTION (H): ICD-10-CM

## 2025-04-25 DIAGNOSIS — R79.89 ELEVATED SERUM CREATININE: ICD-10-CM

## 2025-04-25 DIAGNOSIS — R10.9 ABDOMINAL PAIN: ICD-10-CM

## 2025-04-25 DIAGNOSIS — R19.7 NAUSEA VOMITING AND DIARRHEA: ICD-10-CM

## 2025-04-25 DIAGNOSIS — R11.2 NAUSEA VOMITING AND DIARRHEA: ICD-10-CM

## 2025-04-25 PROBLEM — E78.5 HYPERLIPIDEMIA LDL GOAL <130: Status: ACTIVE | Noted: 2023-10-22

## 2025-04-25 PROBLEM — N17.9 AKI (ACUTE KIDNEY INJURY): Status: ACTIVE | Noted: 2025-04-25

## 2025-04-25 PROBLEM — I10 HTN (HYPERTENSION): Status: ACTIVE | Noted: 2023-10-22

## 2025-04-25 PROBLEM — K52.9 CHRONIC DIARRHEA: Chronic | Status: ACTIVE | Noted: 2023-10-22

## 2025-04-25 PROBLEM — N40.0 BPH (BENIGN PROSTATIC HYPERPLASIA): Status: ACTIVE | Noted: 2023-10-22

## 2025-04-25 LAB
ALBUMIN SERPL BCG-MCNC: 3.8 G/DL (ref 3.5–5.2)
ALBUMIN UR-MCNC: NEGATIVE MG/DL
ALP SERPL-CCNC: 68 U/L (ref 40–150)
ALT SERPL W P-5'-P-CCNC: 126 U/L (ref 0–70)
ANION GAP SERPL CALCULATED.3IONS-SCNC: 11 MMOL/L (ref 7–15)
APPEARANCE UR: CLEAR
AST SERPL W P-5'-P-CCNC: 130 U/L (ref 0–45)
BACTERIA #/AREA URNS HPF: ABNORMAL /HPF
BASOPHILS # BLD AUTO: 0 10E3/UL (ref 0–0.2)
BASOPHILS NFR BLD AUTO: 1 %
BILIRUB SERPL-MCNC: 0.4 MG/DL
BILIRUB UR QL STRIP: NEGATIVE
BUN SERPL-MCNC: 29.6 MG/DL (ref 8–23)
CALCIUM SERPL-MCNC: 8.5 MG/DL (ref 8.8–10.4)
CHLORIDE SERPL-SCNC: 100 MMOL/L (ref 98–107)
COLOR UR AUTO: ABNORMAL
CREAT SERPL-MCNC: 1.43 MG/DL (ref 0.67–1.17)
CRP SERPL-MCNC: 204.36 MG/L
EGFRCR SERPLBLD CKD-EPI 2021: 50 ML/MIN/1.73M2
EOSINOPHIL # BLD AUTO: 0.2 10E3/UL (ref 0–0.7)
EOSINOPHIL NFR BLD AUTO: 2 %
ERYTHROCYTE [DISTWIDTH] IN BLOOD BY AUTOMATED COUNT: 13.5 % (ref 10–15)
ERYTHROCYTE [SEDIMENTATION RATE] IN BLOOD BY WESTERGREN METHOD: 13 MM/HR (ref 0–20)
FLUAV RNA SPEC QL NAA+PROBE: NEGATIVE
FLUBV RNA RESP QL NAA+PROBE: NEGATIVE
GLUCOSE SERPL-MCNC: 100 MG/DL (ref 70–99)
GLUCOSE UR STRIP-MCNC: NEGATIVE MG/DL
HCO3 SERPL-SCNC: 26 MMOL/L (ref 22–29)
HCT VFR BLD AUTO: 41.7 % (ref 40–53)
HGB BLD-MCNC: 13.8 G/DL (ref 13.3–17.7)
HGB UR QL STRIP: ABNORMAL
IMM GRANULOCYTES # BLD: 0 10E3/UL
IMM GRANULOCYTES NFR BLD: 0 %
KETONES UR STRIP-MCNC: NEGATIVE MG/DL
LACTATE SERPL-SCNC: 0.8 MMOL/L (ref 0.7–2)
LEUKOCYTE ESTERASE UR QL STRIP: NEGATIVE
LIPASE SERPL-CCNC: 14 U/L (ref 13–60)
LYMPHOCYTES # BLD AUTO: 1.2 10E3/UL (ref 0.8–5.3)
LYMPHOCYTES NFR BLD AUTO: 14 %
MAGNESIUM SERPL-MCNC: 2 MG/DL (ref 1.7–2.3)
MCH RBC QN AUTO: 30 PG (ref 26.5–33)
MCHC RBC AUTO-ENTMCNC: 33.1 G/DL (ref 31.5–36.5)
MCV RBC AUTO: 91 FL (ref 78–100)
MONOCYTES # BLD AUTO: 0.6 10E3/UL (ref 0–1.3)
MONOCYTES NFR BLD AUTO: 8 %
MUCOUS THREADS #/AREA URNS LPF: PRESENT /LPF
NEUTROPHILS # BLD AUTO: 6.2 10E3/UL (ref 1.6–8.3)
NEUTROPHILS NFR BLD AUTO: 76 %
NITRATE UR QL: NEGATIVE
NRBC # BLD AUTO: 0 10E3/UL
NRBC BLD AUTO-RTO: 0 /100
PH UR STRIP: 5 [PH] (ref 5–7)
PLATELET # BLD AUTO: 159 10E3/UL (ref 150–450)
POTASSIUM SERPL-SCNC: 3.5 MMOL/L (ref 3.4–5.3)
PROCALCITONIN SERPL IA-MCNC: 4.51 NG/ML
PROT SERPL-MCNC: 6.6 G/DL (ref 6.4–8.3)
RBC # BLD AUTO: 4.6 10E6/UL (ref 4.4–5.9)
RBC URINE: <1 /HPF
RSV RNA SPEC NAA+PROBE: NEGATIVE
SARS-COV-2 RNA RESP QL NAA+PROBE: NEGATIVE
SODIUM SERPL-SCNC: 137 MMOL/L (ref 135–145)
SP GR UR STRIP: 1.03 (ref 1–1.03)
SQUAMOUS EPITHELIAL: <1 /HPF
UROBILINOGEN UR STRIP-MCNC: NORMAL MG/DL
WBC # BLD AUTO: 8.2 10E3/UL (ref 4–11)
WBC URINE: 1 /HPF

## 2025-04-25 PROCEDURE — 96360 HYDRATION IV INFUSION INIT: CPT | Mod: 59 | Performed by: NURSE PRACTITIONER

## 2025-04-25 PROCEDURE — 250N000009 HC RX 250: Performed by: NURSE PRACTITIONER

## 2025-04-25 PROCEDURE — 84155 ASSAY OF PROTEIN SERUM: CPT | Performed by: FAMILY MEDICINE

## 2025-04-25 PROCEDURE — 86140 C-REACTIVE PROTEIN: CPT | Performed by: NURSE PRACTITIONER

## 2025-04-25 PROCEDURE — 83605 ASSAY OF LACTIC ACID: CPT | Performed by: NURSE PRACTITIONER

## 2025-04-25 PROCEDURE — 36415 COLL VENOUS BLD VENIPUNCTURE: CPT | Performed by: FAMILY MEDICINE

## 2025-04-25 PROCEDURE — 36415 COLL VENOUS BLD VENIPUNCTURE: CPT | Performed by: NURSE PRACTITIONER

## 2025-04-25 PROCEDURE — 99285 EMERGENCY DEPT VISIT HI MDM: CPT | Mod: 25 | Performed by: NURSE PRACTITIONER

## 2025-04-25 PROCEDURE — 85004 AUTOMATED DIFF WBC COUNT: CPT | Performed by: FAMILY MEDICINE

## 2025-04-25 PROCEDURE — 250N000011 HC RX IP 250 OP 636: Performed by: NURSE PRACTITIONER

## 2025-04-25 PROCEDURE — 83690 ASSAY OF LIPASE: CPT | Performed by: NURSE PRACTITIONER

## 2025-04-25 PROCEDURE — 87040 BLOOD CULTURE FOR BACTERIA: CPT | Performed by: NURSE PRACTITIONER

## 2025-04-25 PROCEDURE — 99222 1ST HOSP IP/OBS MODERATE 55: CPT

## 2025-04-25 PROCEDURE — 96361 HYDRATE IV INFUSION ADD-ON: CPT | Performed by: NURSE PRACTITIONER

## 2025-04-25 PROCEDURE — 258N000003 HC RX IP 258 OP 636: Performed by: NURSE PRACTITIONER

## 2025-04-25 PROCEDURE — 83735 ASSAY OF MAGNESIUM: CPT | Performed by: NURSE PRACTITIONER

## 2025-04-25 PROCEDURE — 87637 SARSCOV2&INF A&B&RSV AMP PRB: CPT | Performed by: NURSE PRACTITIONER

## 2025-04-25 PROCEDURE — 84145 PROCALCITONIN (PCT): CPT | Performed by: NURSE PRACTITIONER

## 2025-04-25 PROCEDURE — 250N000013 HC RX MED GY IP 250 OP 250 PS 637

## 2025-04-25 PROCEDURE — 85652 RBC SED RATE AUTOMATED: CPT | Performed by: NURSE PRACTITIONER

## 2025-04-25 PROCEDURE — 99285 EMERGENCY DEPT VISIT HI MDM: CPT | Performed by: NURSE PRACTITIONER

## 2025-04-25 PROCEDURE — 120N000001 HC R&B MED SURG/OB

## 2025-04-25 PROCEDURE — 74177 CT ABD & PELVIS W/CONTRAST: CPT

## 2025-04-25 PROCEDURE — 81003 URINALYSIS AUTO W/O SCOPE: CPT

## 2025-04-25 RX ORDER — NALOXONE HYDROCHLORIDE 0.4 MG/ML
0.2 INJECTION, SOLUTION INTRAMUSCULAR; INTRAVENOUS; SUBCUTANEOUS
Status: DISCONTINUED | OUTPATIENT
Start: 2025-04-25 | End: 2025-04-28 | Stop reason: HOSPADM

## 2025-04-25 RX ORDER — ACETAMINOPHEN 325 MG/1
325 TABLET ORAL EVERY 4 HOURS PRN
Status: DISCONTINUED | OUTPATIENT
Start: 2025-04-25 | End: 2025-04-28 | Stop reason: HOSPADM

## 2025-04-25 RX ORDER — ONDANSETRON 4 MG/1
4 TABLET, ORALLY DISINTEGRATING ORAL EVERY 6 HOURS PRN
Status: DISCONTINUED | OUTPATIENT
Start: 2025-04-25 | End: 2025-04-28 | Stop reason: HOSPADM

## 2025-04-25 RX ORDER — PIPERACILLIN SODIUM, TAZOBACTAM SODIUM 3; .375 G/15ML; G/15ML
3.38 INJECTION, POWDER, LYOPHILIZED, FOR SOLUTION INTRAVENOUS EVERY 6 HOURS
Status: DISCONTINUED | OUTPATIENT
Start: 2025-04-25 | End: 2025-04-25

## 2025-04-25 RX ORDER — IOPAMIDOL 755 MG/ML
96 INJECTION, SOLUTION INTRAVASCULAR ONCE
Status: COMPLETED | OUTPATIENT
Start: 2025-04-25 | End: 2025-04-25

## 2025-04-25 RX ORDER — LIDOCAINE 40 MG/G
CREAM TOPICAL
Status: DISCONTINUED | OUTPATIENT
Start: 2025-04-25 | End: 2025-04-28 | Stop reason: HOSPADM

## 2025-04-25 RX ORDER — ONDANSETRON 2 MG/ML
4 INJECTION INTRAMUSCULAR; INTRAVENOUS EVERY 6 HOURS PRN
Status: DISCONTINUED | OUTPATIENT
Start: 2025-04-25 | End: 2025-04-28 | Stop reason: HOSPADM

## 2025-04-25 RX ORDER — CALCIUM CARBONATE 500 MG/1
1000 TABLET, CHEWABLE ORAL 4 TIMES DAILY PRN
Status: DISCONTINUED | OUTPATIENT
Start: 2025-04-25 | End: 2025-04-28 | Stop reason: HOSPADM

## 2025-04-25 RX ORDER — ONDANSETRON 2 MG/ML
4 INJECTION INTRAMUSCULAR; INTRAVENOUS EVERY 6 HOURS PRN
Status: DISCONTINUED | OUTPATIENT
Start: 2025-04-25 | End: 2025-04-25

## 2025-04-25 RX ORDER — ACETAMINOPHEN 500 MG
500-1000 TABLET ORAL EVERY 6 HOURS PRN
COMMUNITY

## 2025-04-25 RX ORDER — TAMSULOSIN HYDROCHLORIDE 0.4 MG/1
0.8 CAPSULE ORAL DAILY
Status: DISCONTINUED | OUTPATIENT
Start: 2025-04-26 | End: 2025-04-28 | Stop reason: HOSPADM

## 2025-04-25 RX ORDER — CALCIUM CARBONATE 500 MG/1
1000 TABLET, CHEWABLE ORAL 4 TIMES DAILY PRN
Status: DISCONTINUED | OUTPATIENT
Start: 2025-04-25 | End: 2025-04-25

## 2025-04-25 RX ORDER — PRIMIDONE 50 MG/1
500 TABLET ORAL EVERY EVENING
Status: DISCONTINUED | OUTPATIENT
Start: 2025-04-25 | End: 2025-04-28 | Stop reason: HOSPADM

## 2025-04-25 RX ORDER — PROPRANOLOL HYDROCHLORIDE 60 MG/1
60 CAPSULE, EXTENDED RELEASE ORAL DAILY
Status: DISCONTINUED | OUTPATIENT
Start: 2025-04-26 | End: 2025-04-28 | Stop reason: HOSPADM

## 2025-04-25 RX ORDER — AMOXICILLIN 250 MG
1 CAPSULE ORAL 2 TIMES DAILY PRN
Status: DISCONTINUED | OUTPATIENT
Start: 2025-04-25 | End: 2025-04-25

## 2025-04-25 RX ORDER — PROCHLORPERAZINE MALEATE 5 MG/1
5 TABLET ORAL EVERY 6 HOURS PRN
Status: DISCONTINUED | OUTPATIENT
Start: 2025-04-25 | End: 2025-04-28 | Stop reason: HOSPADM

## 2025-04-25 RX ORDER — OXYCODONE HYDROCHLORIDE 5 MG/1
5 TABLET ORAL EVERY 4 HOURS PRN
Status: DISCONTINUED | OUTPATIENT
Start: 2025-04-25 | End: 2025-04-28 | Stop reason: HOSPADM

## 2025-04-25 RX ORDER — NALOXONE HYDROCHLORIDE 0.4 MG/ML
0.4 INJECTION, SOLUTION INTRAMUSCULAR; INTRAVENOUS; SUBCUTANEOUS
Status: DISCONTINUED | OUTPATIENT
Start: 2025-04-25 | End: 2025-04-28 | Stop reason: HOSPADM

## 2025-04-25 RX ORDER — HYDROCHLOROTHIAZIDE 12.5 MG/1
12.5 TABLET ORAL DAILY
Status: DISCONTINUED | OUTPATIENT
Start: 2025-04-26 | End: 2025-04-28 | Stop reason: HOSPADM

## 2025-04-25 RX ORDER — LISINOPRIL 40 MG/1
40 TABLET ORAL DAILY
Status: DISCONTINUED | OUTPATIENT
Start: 2025-04-26 | End: 2025-04-28 | Stop reason: HOSPADM

## 2025-04-25 RX ORDER — CHOLESTYRAMINE 4 G/9G
2 POWDER, FOR SUSPENSION ORAL DAILY
COMMUNITY

## 2025-04-25 RX ORDER — FINASTERIDE 5 MG/1
5 TABLET, FILM COATED ORAL DAILY
Status: DISCONTINUED | OUTPATIENT
Start: 2025-04-26 | End: 2025-04-28 | Stop reason: HOSPADM

## 2025-04-25 RX ORDER — SODIUM CHLORIDE, SODIUM LACTATE, POTASSIUM CHLORIDE, CALCIUM CHLORIDE 600; 310; 30; 20 MG/100ML; MG/100ML; MG/100ML; MG/100ML
INJECTION, SOLUTION INTRAVENOUS CONTINUOUS
Status: DISCONTINUED | OUTPATIENT
Start: 2025-04-25 | End: 2025-04-28 | Stop reason: HOSPADM

## 2025-04-25 RX ORDER — ATORVASTATIN CALCIUM 20 MG/1
20 TABLET, FILM COATED ORAL EVERY EVENING
Status: DISCONTINUED | OUTPATIENT
Start: 2025-04-25 | End: 2025-04-28 | Stop reason: HOSPADM

## 2025-04-25 RX ORDER — ACETAMINOPHEN 325 MG/1
650 TABLET ORAL EVERY 4 HOURS PRN
Status: DISCONTINUED | OUTPATIENT
Start: 2025-04-25 | End: 2025-04-25

## 2025-04-25 RX ORDER — ONDANSETRON 4 MG/1
4 TABLET, ORALLY DISINTEGRATING ORAL EVERY 6 HOURS PRN
Status: DISCONTINUED | OUTPATIENT
Start: 2025-04-25 | End: 2025-04-25

## 2025-04-25 RX ORDER — AMOXICILLIN 250 MG
2 CAPSULE ORAL 2 TIMES DAILY PRN
Status: DISCONTINUED | OUTPATIENT
Start: 2025-04-25 | End: 2025-04-25

## 2025-04-25 RX ADMIN — SODIUM CHLORIDE, SODIUM LACTATE, POTASSIUM CHLORIDE, AND CALCIUM CHLORIDE: .6; .31; .03; .02 INJECTION, SOLUTION INTRAVENOUS at 17:41

## 2025-04-25 RX ADMIN — FAMOTIDINE 20 MG: 10 INJECTION, SOLUTION INTRAVENOUS at 16:10

## 2025-04-25 RX ADMIN — SODIUM CHLORIDE, SODIUM LACTATE, POTASSIUM CHLORIDE, AND CALCIUM CHLORIDE 1000 ML: .6; .31; .03; .02 INJECTION, SOLUTION INTRAVENOUS at 11:09

## 2025-04-25 RX ADMIN — SODIUM CHLORIDE 64 ML: 9 INJECTION, SOLUTION INTRAVENOUS at 11:25

## 2025-04-25 RX ADMIN — IOPAMIDOL 96 ML: 755 INJECTION, SOLUTION INTRAVENOUS at 11:25

## 2025-04-25 RX ADMIN — PRIMIDONE 500 MG: 50 TABLET ORAL at 17:41

## 2025-04-25 RX ADMIN — ACETAMINOPHEN 325 MG: 325 TABLET ORAL at 22:21

## 2025-04-25 RX ADMIN — ATORVASTATIN CALCIUM 20 MG: 20 TABLET, FILM COATED ORAL at 17:41

## 2025-04-25 ASSESSMENT — ACTIVITIES OF DAILY LIVING (ADL)
DRESSING/BATHING_DIFFICULTY: NO
ADLS_ACUITY_SCORE: 36
CHANGE_IN_FUNCTIONAL_STATUS_SINCE_ONSET_OF_CURRENT_ILLNESS/INJURY: NO
ADLS_ACUITY_SCORE: 53
WERE_AUXILIARY_AIDS_OFFERED?: YES
ADLS_ACUITY_SCORE: 53
CONCENTRATING,_REMEMBERING_OR_MAKING_DECISIONS_DIFFICULTY: NO
ADLS_ACUITY_SCORE: 36
PATIENT'S_PREFERRED_MEANS_OF_COMMUNICATION: WRITTEN COMMUNICATION;VERBAL
WALKING_OR_CLIMBING_STAIRS_DIFFICULTY: NO
ADLS_ACUITY_SCORE: 34
ADLS_ACUITY_SCORE: 36
TOILETING_ISSUES: NO
ADLS_ACUITY_SCORE: 53
THE_FOLLOWING_AIDS_WERE_PROVIDED;: PATIENT DECLINED OFFER OF AUXILIARY AIDS
ADLS_ACUITY_SCORE: 34
FALL_HISTORY_WITHIN_LAST_SIX_MONTHS: YES
DOING_ERRANDS_INDEPENDENTLY_DIFFICULTY: NO
DESCRIBE_HEARING_LOSS: BILATERAL HEARING LOSS
DIFFICULTY_EATING/SWALLOWING: NO
ADLS_ACUITY_SCORE: 36
ADLS_ACUITY_SCORE: 53
WEAR_GLASSES_OR_BLIND: NO
HEARING_DIFFICULTY_OR_DEAF: YES
ADLS_ACUITY_SCORE: 36
DIFFICULTY_COMMUNICATING: NO
NUMBER_OF_TIMES_PATIENT_HAS_FALLEN_WITHIN_LAST_SIX_MONTHS: 5
ADLS_ACUITY_SCORE: 53

## 2025-04-25 NOTE — ED TRIAGE NOTES
Nausea, vomiting and diarrhea since Tuesday. Has not improved. History of bowel resections d/t colon cancer; has had bowel obstructions in the past.     Triage Assessment (Adult)       Row Name 04/25/25 1007          Triage Assessment    Airway WDL WDL        Respiratory WDL    Respiratory WDL WDL        Skin Circulation/Temperature WDL    Skin Circulation/Temperature WDL WDL        Cardiac WDL    Cardiac WDL WDL        Peripheral/Neurovascular WDL    Peripheral Neurovascular WDL WDL        Cognitive/Neuro/Behavioral WDL    Cognitive/Neuro/Behavioral WDL WDL

## 2025-04-25 NOTE — PROGRESS NOTES
[facility] ADMISSION NOTE    Patient admitted to room 1400 at approximately 1430 via cart from emergency room. Patient was accompanied by transport tech.     Verbal SBAR report received from V[place] prior to patient arrival.     Patient ambulated to bed with stand-by assist. Patient alert and oriented X 3. The patient is not having any pain.  . Admission vital signs: Blood pressure (!) 142/61, pulse 63, temperature 98.2  F (36.8  C), temperature source Oral, resp. rate 16, SpO2 95%. Patient was oriented to plan of care, call light, bed controls, tv, telephone, bathroom, and visiting hours.     Risk Assessment    The following safety risks were identified during admission: fall. Yellow risk band applied: YES.     Skin Initial Assessment    This writer admitted this patient and completed a full skin assessment and Sulaiman score in the Adult PCS flowsheet.   Photo documentation of skin problem and/or wound competed via Beem application (located under Media):  N/A    Appropriate interventions initiated as needed.     Declined full skin assessment         Education    Patient has a [place] to Observation order: No  Observation education completed and documented: N/A      DARBY MCCORMICK RN

## 2025-04-25 NOTE — PROGRESS NOTES
"[id number] Patient has not had any diarrhea, denies nausea. Has a \"little pain\" LLQ abdomen. BS hypoactive. VSS. New IV placed.  "

## 2025-04-25 NOTE — ED NOTES
[facility]   Admission Handoff    The patient is Peña Hall, 80 year old who arrived in the ED by CAR from home with a complaint of Nausea, Vomiting, & Diarrhea  . The patient's current symptoms are new and during this time the symptoms have remained the same. In the ED, patient was diagnosed with   Final diagnoses:   Abdominal pain   Elevated serum creatinine   Small bowel obstruction (H)   Nausea vomiting and diarrhea         Needed?: No    Allergies:  No Known Allergies    Past Medical Hx:   Past Medical History:   Diagnosis Date    Arthritis     Asthma     Balance problems     Bladder tumor     BPH (benign prostatic hyperplasia)     Chronic diarrhea 10/22/2023    Chronic midline low back pain without sciatica 10/22/2023    Chronic pain of right knee     Colon cancer (H) 2000    COPD (chronic obstructive pulmonary disease) (H)     DDD (degenerative disc disease), lumbar     HTN (hypertension)     Hyperlipidemia LDL goal <130     Lumbar facet arthropathy     Lumbar spondylolysis     Tremor        Initial vitals were: BP: 138/87  Pulse: 63  SpO2: 98 %   Recent vital Signs: /87   Pulse 63   SpO2 98%     Elimination Status: Continent: Yes     Activity Level: SBA    Fall Status: Reason for falls risk:  Mobility  nonskid shoes/slippers when out of bed, arm band in place, patient and family education, and activity supervised    Baseline Mental status: WDL  Current Mental Status changes: at basesline    Infection present or suspected this encounter: cultures pending  Sepsis suspected: No    Isolation type: Enteric    Bariatric equipment needed?: No    In the ED these meds were given:   Medications   lactated ringers infusion (has no administration in time range)   lactated ringers BOLUS 1,000 mL (1,000 mLs Intravenous $New Bag 4/25/25 1109)   iopamidol (ISOVUE-370) solution 96 mL (96 mLs Intravenous $Given 4/25/25 1125)   sodium chloride 0.9 % bag for CT scan flush (64  mLs Intravenous $Given 4/25/25 1126)       Drips running?  Yes    Home pump  No    Current LDAs: Peripheral IV: Site right AC; Gauge 20g  lactated Ringer's     Results:   Labs/Imaging  Ordered and Resulted from Time of ED Arrival Up to the Time of Departure from the ED  Results for orders placed or performed during the hospital encounter of 04/25/25 (from the past 24 hours)   CBC with Platelets & Differential    Narrative    The following orders were created for panel order CBC with Platelets & Differential.  Procedure                               Abnormality         Status                     ---------                               -----------         ------                     CBC with platelets and ...[[id number]]                      Final result                 Please view results for these tests on the individual orders.   Comprehensive metabolic panel   Result Value Ref Range    Sodium 137 135 - 145 mmol/L    Potassium 3.5 3.4 - 5.3 mmol/L    Carbon Dioxide (CO2) 26 22 - 29 mmol/L    Anion Gap 11 7 - 15 mmol/L    Urea Nitrogen 29.6 (H) 8.0 - 23.0 mg/dL    Creatinine 1.43 (H) 0.67 - 1.17 mg/dL    GFR Estimate 50 (L) >60 mL/min/1.73m2    Calcium 8.5 (L) 8.8 - 10.4 mg/dL    Chloride 100 98 - 107 mmol/L    Glucose 100 (H) 70 - 99 mg/dL    Alkaline Phosphatase 68 40 - 150 U/L     (H) 0 - 45 U/L     (H) 0 - 70 U/L    Protein Total 6.6 6.4 - 8.3 g/dL    Albumin 3.8 3.5 - 5.2 g/dL    Bilirubin Total 0.4 <=1.2 mg/dL   CBC with platelets and differential   Result Value Ref Range    WBC Count 8.2 4.0 - 11.0 10e3/uL    RBC Count 4.60 4.40 - 5.90 10e6/uL    Hemoglobin 13.8 13.3 - 17.7 g/dL    Hematocrit 41.7 40.0 - 53.0 %    MCV 91 78 - 100 fL    MCH 30.0 26.5 - 33.0 pg    MCHC 33.1 31.5 - 36.5 g/dL    RDW 13.5 10.0 - 15.0 %    Platelet Count 159 150 - 450 10e3/uL    % Neutrophils 76 %    % Lymphocytes 14 %    % Monocytes 8 %    % Eosinophils 2 %    % Basophils 1 %    % Immature Granulocytes 0 %    NRBCs  per 100 WBC 0 <1 /100    Absolute Neutrophils 6.2 1.6 - 8.3 10e3/uL    Absolute Lymphocytes 1.2 0.8 - 5.3 10e3/uL    Absolute Monocytes 0.6 0.0 - 1.3 10e3/uL    Absolute Eosinophils 0.2 0.0 - 0.7 10e3/uL    Absolute Basophils 0.0 0.0 - 0.2 10e3/uL    Absolute Immature Granulocytes 0.0 <=0.4 10e3/uL    Absolute NRBCs 0.0 10e3/uL   Lipase   Result Value Ref Range    Lipase 14 13 - 60 U/L   Magnesium   Result Value Ref Range    Magnesium 2.0 1.7 - 2.3 mg/dL   CRP inflammation   Result Value Ref Range    CRP Inflammation 204.36 (H) <5.00 mg/L   Erythrocyte sedimentation rate auto   Result Value Ref Range    Erythrocyte Sedimentation Rate 13 0 - 20 mm/hr   Procalcitonin   Result Value Ref Range    Procalcitonin 4.51 (H) <0.50 ng/mL   CT Abdomen Pelvis w Contrast    Narrative    EXAM: CT ABDOMEN PELVIS W CONTRAST  LOCATION: [facility]  DATE: 4/25/2025    INDICATION: hx of bowel obstruction, bladder cancer  4 day hx of abdominal pain, nausea, vomiting, diarrhea, fevers  COMPARISON: CT abdomen and pelvis performed on 1/30/2025  TECHNIQUE: CT scan of the abdomen and pelvis was performed following injection of IV contrast. Multiplanar reformats were obtained. Dose reduction techniques were used.  CONTRAST: 96 mL Isovue 370    FINDINGS:   LOWER CHEST: Multivessel coronary artery calcifications are present. Partially seen vascular calcifications in the thoracic aorta. Small hiatal hernia is present. Subsegmental atelectasis is seen within the lower lungs. Stable pulmonary nodules measuring   up to 5 mm within the right middle lobe (series 2, image 10). Calcified granulomas are present bilaterally.    HEPATOBILIARY: Liver is enlarged measuring 19 cm in craniocaudal length. 5 mm hypoattenuating focus near the falciform ligament was less conspicuous on the prior exam, possibly due to phase of contrast on the prior study (series 2, image 31). Gallbladder   is surgically absent.    PANCREAS: No  significant mass, duct dilatation, or inflammatory change.    SPLEEN: Multiple calcified granulomas are seen in the spleen.    ADRENAL GLANDS: Stable mild nodular thickening within the adrenal glands.    KIDNEYS/BLADDER: No hydronephrosis is seen. Stable exophytic cyst along the superior pole of the left kidney. Other subcentimeter hypodense lesions are too small to characterize but statistically likely reflect small cysts and appear unchanged.    BOWEL: Mild wall thickening is seen involving the rectum, sigmoid and descending colon. Multiple dilated loops of small bowel are seen measuring up to 5.1 cm along the left anterior abdomen. Relatively gradual transition to nondilated bowel is present in   the in the left lower abdomen (series 2, image 57). Mild wall thickening is also noted along several loops of small bowel distal to the areas of dilation. Mild wall thickening is present along the descending and sigmoid colon.    LYMPH NODES: No lymphadenopathy.    VASCULATURE: Moderate to severe vascular calcifications are seen in the abdominal aorta and iliac branches.    PELVIC ORGANS: Obscured due to streak artifact from bilateral hip arthroplasty. Small fat-containing inguinal hernias are present, right greater than left.    MUSCULOSKELETAL: Small fat-containing umbilical hernia is present. Multilevel degenerative changes are present in the spine. Bilateral hip arthroplasties are seen. Partially seen thoracic spinal stimulator leads.      Impression    IMPRESSION:   1.  Multiple dilated loops of small bowel are present measuring up to 5.1 cm in diameter. Relatively gradual transition to nondilated bowel is seen in the left lower abdomen. Findings are suggestive of small bowel obstruction.  2.  Mild wall thickening is seen along several loops of small bowel distal to the areas of dilation as well as along the descending and sigmoid colon. Findings are suggestive of enterocolitis. Suggest clinical correlation.  3.   Hepatomegaly.  4.  Stable pulmonary nodules measuring up to 5 mm in the right middle lobe.  5.  5 mm hypoattenuating lesion near the falciform ligament was not definitively seen previously and suspected to reflect focal fatty infiltration given its location. Follow-up characterization could be considered with MRI exam per guidelines below.    REFERENCE:  Guidelines for Management of Incidental Pulmonary Nodules Detected on CT Images: From the Fleischner Society 2017.   Guidelines apply to incidental nodules in patients who are 35 years or older.  Guidelines do not apply to lung cancer screening, patients with immunosuppression, or patients with known primary cancer.    MULTIPLE NODULES  Nodule size <6 mm  Low-risk patients: No follow-up needed.  High-risk patients: Optional follow-up at 12 months.    Nodule size 6 mm or larger  Low-risk patients: Follow-up CT at 3-6 months, then consider CT at 18-24 months.  High-risk patients: Follow-up CT at 3-6 months, then at 18-24 months if no change.  -Use most suspicious nodule as guide to management.    Small (0.5 - 1.5 cm) low attenuation liver lesion with suspicious imaging features. 6 month follow-up imaging with MRI/CT recommended to assess changes in size and morphology.    Adapted from Consensus Recommendations, J Am Simon Radiol 2010;7:754-773.     Lactic acid whole blood with 1x repeat in 2 hr when >2   Result Value Ref Range    Lactic Acid, Initial 0.8 0.7 - 2.0 mmol/L       For the majority of the shift this patient's behavior was Green     Cardiac Rhythm: Normal Sinus  Pt needs tele? No  Skin/wound Issues: None    Code Status: Full Code    Pain control: good    Nausea control: pt had none    Abnormal labs/tests/findings requiring intervention: Small bowel obstruction    Patient tested for COVID 19 prior to admission: YES     OBS brochure/video discussed/provided to patient/family: N/A     Family present during ED course? Yes     Family Comments/Social Situation  comments: Daughter Jessi Hall RN in ER  Tasks needing completion:  Urine and stool sample    Viji Murillo, RN

## 2025-04-25 NOTE — MEDICATION SCRIBE - ADMISSION MEDICATION HISTORY
Medication Scribe Admission Medication History    Admission medication history is complete. The information provided in this note is only as accurate as the sources available at the time of the update.    Information Source(s): Patient via in-person    Pertinent Information: Medications reviewed with patient at bedside. Patient took all AM meds today and PM meds yesterday.     Changes made to PTA medication list:  Added: Acetaminophen 500 mg PRN, Cholestyramine 4 g 2 packets qd  Deleted: Gabapentin 100 mg, Zofran 4 mg ODT, Senokot, Cholestyramine 4 g 1 packet BID  Changed: None    Allergies reviewed with patient and updates made in EHR: yes    Medication History Completed By: Keiry Bain 4/25/2025 1:20 PM    PTA Med List   Medication Sig Last Dose/Taking    acetaminophen (TYLENOL) 500 MG tablet Take 500-1,000 mg by mouth every 6 hours as needed for mild pain. 4/24/2025 Evening    albuterol (PROAIR HFA/PROVENTIL HFA/VENTOLIN HFA) 108 (90 Base) MCG/ACT inhaler Inhale 2 puff using inhaler every four hours as needed for wheezing. pretreat before activity Unknown    atorvastatin (LIPITOR) 20 MG tablet Take 1 tablet (20 mg) by mouth every evening. 4/24/2025 Evening    cholecalciferol (VITAMIN D3) 125 mcg (5000 units) capsule Take 125 mcg by mouth daily Past Week    cholestyramine (QUESTRAN) 4 g packet Take 2 packets by mouth daily. 4/25/2025 Morning    diphenhydrAMINE-acetaminophen (TYLENOL PM)  MG tablet Take 2 tablets by mouth nightly as needed for sleep Past Week    finasteride (PROSCAR) 5 MG tablet Take 1 tablet (5 mg) by mouth daily. 4/25/2025 Morning    hydroCHLOROthiazide 12.5 MG tablet TAKE ONE TABLET BY MOUTH ONCE DAILY 4/25/2025 Morning    lisinopril (ZESTRIL) 40 MG tablet Take 1 tablet (40 mg) by mouth daily. 4/25/2025 Morning    meloxicam (MOBIC) 7.5 MG tablet Take 1.5 tablets (11.25 mg) by mouth daily 4/25/2025 Morning    primidone (MYSOLINE) 250 MG tablet Take 2 tablets (500 mg) by mouth every  evening. 4/24/2025 Evening    propranolol ER (INDERAL LA) 60 MG 24 hr capsule Take 1 capsule (60 mg) by mouth daily 4/25/2025 Morning    tamsulosin (FLOMAX) 0.4 MG capsule Take 2 capsules (0.8 mg) by mouth daily. 4/25/2025 Morning

## 2025-04-25 NOTE — H&P
[facility]    History and Physical - Hospitalist Service       Date of Admission:  4/25/2025    Assessment & Plan    Peña Hall is a 80 year old male with past medical hx of bladder cancer s/p TURBT, BPH, HTN, HLD admitted on 4/25/2025. He presented for nausea, vomiting, diarrhea for 3 days.    SBO  Hx of SBO    N/v/d started 4/22 with intermittent crampy abdominal pain. States this is the 3rd time he's had n/v/d in the last 2 months, but didn't seek care for the other times. The other episodes lasted ~2 days.     CT showed findings suggestive of SBO and enterocolitis. Hx of SBO's, last one 10/2023. NGT was unsuccessfully placed in ER. Patient has not had vomiting since 0700 on 4/25, so will withhold further placement for now.     - Gen surg consulted  - Place NGT to LIS if develops vomiting  - NPO  - LR 150ml/hr    Enterocolitis  CT with findings of enterocolitis. , procal 4.51. Does not meet SIRS criteria- no leukocytosis, tachycardia, or fever. Patient reported a fever of 102 on 4/24.   - Low threshold to start Zosyn for intra-abdominal infection  - Blood cultures pending  - Enteric panel and C. Diff pending  - UA pending to evaluate for other cause of infection    CAITLYN  Creatinine 1.43 on admission, baseline 0.7-0.9. Likely due to n/v/d.  - IVF as above    Hepatomegaly  Incidental finding on CT.  and  on admission.  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  - Continued PTA Finasteride 5mg, Tamsulosin 0.8mg    HTN  - Held PTA hydrochlorothiazide 12.5mg and Lisinopril 40mg for CAITLYN    Essential tremor  - Continued PTA Propranolol 60mg daily, Primidone 500mg at bedtime     HLD  - Continued PTA Atorvastatin 20mg    Hx of colon cancer s/p colectomy 2000  Noted.    Chronic back pain s/p spinal cord stimulator implant  Implanted 1/2024    Abnormal lesion  Incidental finding on CT as 5  mm hypoattenuating lesion near the falciform ligament suspected to reflect focal fatty infiltration.    - Recommend MRI or CT in 6 months.        Diet: NPO for Medical/Clinical Reasons Except for: Ice Chips, Meds    DVT Prophylaxis: Ambulate every shift  Shaffer Catheter: Not present  Lines: None     Cardiac Monitoring: None  Code Status: Full Code    Clinically Significant Risk Factors Present on Admission           # Hypocalcemia: Lowest Ca = 8.5 mg/dL in last 2 days, will monitor and replace as appropriate         # Hypertension: Noted on problem list               # Asthma: noted on problem list        Disposition Plan     Medically Ready for Discharge: Anticipated in 2-4 Days         The patient's care was discussed with the Attending Physician, Dr. Carmona, Bedside Nurse, Patient, and Patient's Family.    Ruddy Patterson PA-C  Hospitalist Service  [facility]  Securely message with [employer] (more info)  Text page via [facility] Paging/Directory     ______________________________________________________________________    Chief Complaint   N/v/d    History is obtained from the patient    History of Present Illness   Peña Hall is a 80 year old male who presented for vomiting diarrhea.    Patient started having vomiting and diarrhea 4/22.  He states that he has roughly 5 episodes of each per day.  However, he has not had any in the last 7 hours.  Last bowel movement this morning.  Denies melena, hematochezia, steatorrhea.  He states that this is the third time he has had a gastroenteritis picture in the last 2 months.  He did not seek medical care for the other 2 times.  He has mild left upper quadrant pain.  He denies recent alcohol use, and he quit smoking 10 years ago.  He denies recent cough, dysuria, urinary urgency or, rashes.    Past Medical History    Past Medical History:   Diagnosis Date    Arthritis     Asthma     Balance problems     Bladder tumor     BPH (benign prostatic  hyperplasia)     Chronic diarrhea 10/22/2023    Chronic midline low back pain without sciatica 10/22/2023    Chronic pain of right knee     Colon cancer (H) 2000    COPD (chronic obstructive pulmonary disease) (H)     DDD (degenerative disc disease), lumbar     HTN (hypertension)     Hyperlipidemia LDL goal <130     Lumbar facet arthropathy     Lumbar spondylolysis     Tremor        Past Surgical History   Past Surgical History:   Procedure Laterality Date    ABDOMEN SURGERY  2000    APPENDECTOMY  1959    BOWEL RESECTION  2000    CHOLECYSTECTOMY  2018    COLONOSCOPY N/A 08/18/2023    Procedure: COLONOSCOPY, WITH POLYPECTOMY AND BIOPSY;  Surgeon: Marli Lyons MD;  Location: [facility]    COLONOSCOPY N/A 08/18/2023    Procedure: COLONOSCOPY, FLEXIBLE, WITH LESION REMOVAL USING SNARE;  Surgeon: Marli Lyons MD;  Location: [facility]    CYSTOSCOPY, TRANSURETHRAL RESECTION (TUR) TUMOR BLADDER, COMBINED N/A 2/26/2025    Procedure: CYSTOSCOPY, WITH TRANSURETHRAL RESECTION BLADDER TUMOR;  Surgeon: Bryan Allen MD;  Location: [facility] [place] [facility]    EYE SURGERY  2018    ORTHOPEDIC SURGERY  2012 2013 2012 2015       Prior to Admission Medications   Prior to Admission Medications   Prescriptions Last Dose Informant Patient Reported? Taking?   acetaminophen (TYLENOL) 500 MG tablet 4/24/2025 Evening Self Yes Yes   Sig: Take 500-1,000 mg by mouth every 6 hours as needed for mild pain.   albuterol (PROAIR HFA/PROVENTIL HFA/VENTOLIN HFA) 108 (90 Base) MCG/ACT inhaler Unknown Self No Yes   Sig: Inhale 2 puff using inhaler every four hours as needed for wheezing. pretreat before activity   atorvastatin (LIPITOR) 20 MG tablet 4/24/2025 Evening Self No Yes   Sig: Take 1 tablet (20 mg) by mouth every evening.   cholecalciferol (VITAMIN D3) 125 mcg (5000 units) capsule Past Week Self Yes Yes   Sig: Take 125 mcg by mouth daily   cholestyramine (QUESTRAN) 4 g packet 4/25/2025 Morning Self Yes Yes   Sig: Take 2 packets by mouth daily.    diphenhydrAMINE-acetaminophen (TYLENOL PM)  MG tablet Past Week Self Yes Yes   Sig: Take 2 tablets by mouth nightly as needed for sleep   finasteride (PROSCAR) 5 MG tablet 4/25/2025 Morning Self No Yes   Sig: Take 1 tablet (5 mg) by mouth daily.   hydroCHLOROthiazide 12.5 MG tablet 4/25/2025 Morning Self No Yes   Sig: TAKE ONE TABLET BY MOUTH ONCE DAILY   lisinopril (ZESTRIL) 40 MG tablet 4/25/2025 Morning Self No Yes   Sig: Take 1 tablet (40 mg) by mouth daily.   meloxicam (MOBIC) 7.5 MG tablet 4/25/2025 Morning Self No Yes   Sig: Take 1.5 tablets (11.25 mg) by mouth daily   primidone (MYSOLINE) 250 MG tablet 4/24/2025 Evening Self No Yes   Sig: Take 2 tablets (500 mg) by mouth every evening.   propranolol ER (INDERAL LA) 60 MG 24 hr capsule 4/25/2025 Morning Self No Yes   Sig: Take 1 capsule (60 mg) by mouth daily   tamsulosin (FLOMAX) 0.4 MG capsule 4/25/2025 Morning Self No Yes   Sig: Take 2 capsules (0.8 mg) by mouth daily.      Facility-Administered Medications: None      2023 UPDATE: these sections are not required for  billing, but can be added when medically relevant     Physical Exam   Vital Signs: Temp: 98.2  F (36.8  C) Temp src: Oral BP: (!) 142/61 Pulse: 63   Resp: 16 SpO2: 95 % O2 Device: None (Room air)    Weight: 0 lbs 0 oz    Constitutional: Alert, oriented, cooperative, in no acute distress, appears nontoxic.  HENT: Oropharynx is clear and moist. No evidence of cranial trauma. Normocephalic. Eyes anicteric.   Cardiovascular: Regular rate and rhythm, normal S1 and S2, and no murmur noted. No lower extremity edema.  Respiratory: Clear to auscultation bilaterally with no adventitious breath sounds.   GI: Soft, non-tender, hyperactive bowel sounds, hyperresonant to percussion, no hepatosplenomegaly, no masses appreciated.  Musculoskeletal: Normal muscle bulk and tone. FROM in all extremities   Skin: Warm and dry, no rashes on exposed skin.   Psych: Normal affect and speech.  Neurologic:  Cranial nerves 2-12 are grossly intact.       Medical Decision Making       70 MINUTES SPENT BY ME on the date of service doing chart review, history, exam, documentation & further activities per the note.      Data     I have personally reviewed the following data over the past 24 hrs:    8.2  \   13.8   / 159     137 100 29.6 (H) /  100 (H)   3.5 26 1.43 (H) \     ALT: 126 (H) AST: 130 (H) AP: 68 TBILI: 0.4   ALB: 3.8 TOT PROTEIN: 6.6 LIPASE: 14     Procal: 4.51 (H) CRP: 204.36 (H) Lactic Acid: 0.8         Imaging results reviewed over the past 24 hrs:   Recent Results (from the past 24 hours)   CT Abdomen Pelvis w Contrast    Narrative    EXAM: CT ABDOMEN PELVIS W CONTRAST  LOCATION: [facility]  DATE: 4/25/2025    INDICATION: hx of bowel obstruction, bladder cancer  4 day hx of abdominal pain, nausea, vomiting, diarrhea, fevers  COMPARISON: CT abdomen and pelvis performed on 1/30/2025  TECHNIQUE: CT scan of the abdomen and pelvis was performed following injection of IV contrast. Multiplanar reformats were obtained. Dose reduction techniques were used.  CONTRAST: 96 mL Isovue 370    FINDINGS:   LOWER CHEST: Multivessel coronary artery calcifications are present. Partially seen vascular calcifications in the thoracic aorta. Small hiatal hernia is present. Subsegmental atelectasis is seen within the lower lungs. Stable pulmonary nodules measuring   up to 5 mm within the right middle lobe (series 2, image 10). Calcified granulomas are present bilaterally.    HEPATOBILIARY: Liver is enlarged measuring 19 cm in craniocaudal length. 5 mm hypoattenuating focus near the falciform ligament was less conspicuous on the prior exam, possibly due to phase of contrast on the prior study (series 2, image 31). Gallbladder   is surgically absent.    PANCREAS: No significant mass, duct dilatation, or inflammatory change.    SPLEEN: Multiple calcified granulomas are seen in the spleen.    ADRENAL GLANDS:  Stable mild nodular thickening within the adrenal glands.    KIDNEYS/BLADDER: No hydronephrosis is seen. Stable exophytic cyst along the superior pole of the left kidney. Other subcentimeter hypodense lesions are too small to characterize but statistically likely reflect small cysts and appear unchanged.    BOWEL: Mild wall thickening is seen involving the rectum, sigmoid and descending colon. Multiple dilated loops of small bowel are seen measuring up to 5.1 cm along the left anterior abdomen. Relatively gradual transition to nondilated bowel is present in   the in the left lower abdomen (series 2, image 57). Mild wall thickening is also noted along several loops of small bowel distal to the areas of dilation. Mild wall thickening is present along the descending and sigmoid colon.    LYMPH NODES: No lymphadenopathy.    VASCULATURE: Moderate to severe vascular calcifications are seen in the abdominal aorta and iliac branches.    PELVIC ORGANS: Obscured due to streak artifact from bilateral hip arthroplasty. Small fat-containing inguinal hernias are present, right greater than left.    MUSCULOSKELETAL: Small fat-containing umbilical hernia is present. Multilevel degenerative changes are present in the spine. Bilateral hip arthroplasties are seen. Partially seen thoracic spinal stimulator leads.      Impression    IMPRESSION:   1.  Multiple dilated loops of small bowel are present measuring up to 5.1 cm in diameter. Relatively gradual transition to nondilated bowel is seen in the left lower abdomen. Findings are suggestive of small bowel obstruction.  2.  Mild wall thickening is seen along several loops of small bowel distal to the areas of dilation as well as along the descending and sigmoid colon. Findings are suggestive of enterocolitis. Suggest clinical correlation.  3.  Hepatomegaly.  4.  Stable pulmonary nodules measuring up to 5 mm in the right middle lobe.  5.  5 mm hypoattenuating lesion near the falciform  ligament was not definitively seen previously and suspected to reflect focal fatty infiltration given its location. Follow-up characterization could be considered with MRI exam per guidelines below.    REFERENCE:  Guidelines for Management of Incidental Pulmonary Nodules Detected on CT Images: From the Fleischner Society 2017.   Guidelines apply to incidental nodules in patients who are 35 years or older.  Guidelines do not apply to lung cancer screening, patients with immunosuppression, or patients with known primary cancer.    MULTIPLE NODULES  Nodule size <6 mm  Low-risk patients: No follow-up needed.  High-risk patients: Optional follow-up at 12 months.    Nodule size 6 mm or larger  Low-risk patients: Follow-up CT at 3-6 months, then consider CT at 18-24 months.  High-risk patients: Follow-up CT at 3-6 months, then at 18-24 months if no change.  -Use most suspicious nodule as guide to management.    Small (0.5 - 1.5 cm) low attenuation liver lesion with suspicious imaging features. 6 month follow-up imaging with MRI/CT recommended to assess changes in size and morphology.    Adapted from Consensus Recommendations, J Am Simon Radiol 2010;7:754-773.

## 2025-04-25 NOTE — ED PROVIDER NOTES
History     Chief Complaint   Patient presents with    Nausea, Vomiting, & Diarrhea     HPI  Peña Hall is a 80 year old male with past medical history of chronic diarrhea, hypertension, hyperlipidemia, benign prostatic hypertrophy malignant neoplasm of the bladder, recurrent small bowel obstructions, colon cancer s/p radiation and resection 2020, appendectomy, cholecystectomy, recurrent episodes of SBO, degenerative disc disease, chronic midline low back pain who presents to the emergency department with a 4-day history of nausea, vomiting, abdominal pain, diarrhea, fevers with maximum temperature up to 102.  Patient describes his pain as constant cramping and rates this pain 4 out of 10 to 8 out of 10.  Patient states the pain started across the entire abdomen and now is worse on the left side.  Patient estimates 5 episodes of vomiting in the past 3 days and at least 20 episodes of diarrhea.  Patient endorses fecal urgency and denies hematemesis, melenic stools, bright red rectal bleeding.  Patient reports last episode of vomiting was approximately midnight last night and last episode of diarrhea was this morning.  Patient does admit to recurrent small bowel obstructions and reports this is possibly the third episode within the last 2 months.  Patient denies eating any recent food left out at room temperature or uncooked or undercooked meats.  Patient denies any changes in his medications.  He reports he is taking all of his medications as prescribed.  Patient denies chest pain, cough, shortness of breath, difficulty breathing, dysuria, hematuria, left or right sided facial droop, memory loss, confusion, profound dizziness.  Pt states he took am meds, ate an english muffin 1/2 with butter, took some tylenol last night that helped bring his fever down.    Allergies:  No Known Allergies    Problem List:    Patient Active Problem List    Diagnosis Date Noted    Small bowel obstruction (H) 04/25/2025      Priority: Medium    CAITLYN (acute kidney injury) 04/25/2025     Priority: Medium    Malignant neoplasm of overlapping sites of bladder (H) 03/12/2025     Priority: Medium    Chronic pain of right knee 01/09/2025     Priority: Medium    Balance problem 01/09/2025     Priority: Medium    Mild intermittent asthma without complication 01/03/2024     Priority: Medium     Likely more copd picture       Tremor 01/03/2024     Priority: Medium     Improved on primidone.  Diarrhea side effect?        DDD (degenerative disc disease), lumbar 12/22/2023     Priority: Medium    Lumbar facet arthropathy 12/22/2023     Priority: Medium    Lumbar spondylolysis 12/22/2023     Priority: Medium    Abnormal CT lung screening 11/27/2023     Priority: Medium     Currently managed with follow up imaging by [facility] [place] [facility].        Chronic diarrhea 10/22/2023     Priority: Medium     After colon resection.        Chronic midline low back pain without sciatica 10/22/2023     Priority: Medium    History of colon cancer 10/22/2023     Priority: Medium    HTN (hypertension) 10/22/2023     Priority: Medium    Hyperlipidemia LDL goal <130 10/22/2023     Priority: Medium    BPH (benign prostatic hyperplasia) 10/22/2023     Priority: Medium        Past Medical History:    Past Medical History:   Diagnosis Date    Arthritis     Asthma     Balance problems     Bladder tumor     BPH (benign prostatic hyperplasia)     Chronic diarrhea 10/22/2023    Chronic midline low back pain without sciatica 10/22/2023    Chronic pain of right knee     Colon cancer (H) 2000    COPD (chronic obstructive pulmonary disease) (H)     DDD (degenerative disc disease), lumbar     HTN (hypertension)     Hyperlipidemia LDL goal <130     Lumbar facet arthropathy     Lumbar spondylolysis     Tremor        Past Surgical History:    Past Surgical History:   Procedure Laterality Date    ABDOMEN SURGERY  2000    APPENDECTOMY  1959    BOWEL RESECTION  2000     CHOLECYSTECTOMY  2018    COLONOSCOPY N/A 2023    Procedure: COLONOSCOPY, WITH POLYPECTOMY AND BIOPSY;  Surgeon: Marli Lyons MD;  Location: [facility]    COLONOSCOPY N/A 2023    Procedure: COLONOSCOPY, FLEXIBLE, WITH LESION REMOVAL USING SNARE;  Surgeon: Marli Lyons MD;  Location: [facility]    CYSTOSCOPY, TRANSURETHRAL RESECTION (TUR) TUMOR BLADDER, COMBINED N/A 2025    Procedure: CYSTOSCOPY, WITH TRANSURETHRAL RESECTION BLADDER TUMOR;  Surgeon: Bryan Allen MD;  [place] [facility] [place]    EYE SURGERY  2018    ORTHOPEDIC SURGERY  2012       Family History:    Family History   Problem Relation Age of Onset    Diabetes Sister     Colon Cancer Other         Aunt       Social History:  Marital Status:   [2]  Social History     Tobacco Use    Smoking status: Former     Current packs/day: 0.00     Average packs/day: 1 pack/day for 48.0 years (48.0 ttl pk-yrs)     Types: Cigarettes     Start date: 1970     Quit date: 2016     Years since quittin.0    Smokeless tobacco: Never   Vaping Use    Vaping status: Never Used   Substance Use Topics    Alcohol use: Yes     Comment: rare    Drug use: Never        Medications:    No current outpatient medications on file.        Review of Systems  As mentioned above in the history present illness. All other systems were reviewed and are negative.    Physical Exam   BP: 138/87  Pulse: 63  Temp: 98.2  F (36.8  C)  Resp: 16  SpO2: 98 %      Physical Exam  Vitals and nursing note reviewed.   Constitutional:       General: He is not in acute distress.     Appearance: Normal appearance. He is well-developed. He is ill-appearing. He is not toxic-appearing or diaphoretic.   HENT:      Head: Normocephalic and atraumatic.      Right Ear: External ear normal.      Left Ear: External ear normal.      Nose: Nose normal.      Mouth/Throat:      Pharynx: Uvula midline.   Eyes:      General:         Right eye: No discharge.         Left eye:  No discharge.      Conjunctiva/sclera: Conjunctivae normal.   Neck:      Thyroid: No thyromegaly.   Cardiovascular:      Rate and Rhythm: Normal rate and regular rhythm.      Heart sounds: Normal heart sounds. No murmur heard.     No friction rub. No gallop.   Pulmonary:      Effort: Pulmonary effort is normal.      Breath sounds: Normal breath sounds. No stridor. No wheezing.   Abdominal:      General: Abdomen is protuberant. Bowel sounds are normal. There is no distension.      Palpations: Abdomen is soft. There is no mass.      Tenderness: There is abdominal tenderness in the periumbilical area. There is no right CVA tenderness, left CVA tenderness, guarding or rebound.      Comments: Percussion painn in al four quadrant  Pain with palpation LUQ, LLQ  Pain with rebound RUQ, LUQ   Lymphadenopathy:      Cervical: No cervical adenopathy.   Skin:     General: Skin is warm.      Capillary Refill: Capillary refill takes less than 2 seconds.      Findings: No rash.   Neurological:      General: No focal deficit present.      Mental Status: He is alert and oriented to person, place, and time.   Psychiatric:         Mood and Affect: Mood normal.         Behavior: Behavior normal. Behavior is cooperative.         ED Course     ED Course as of 04/25/25 2009 Fri Apr 25, 2025   1150 Procalcitonin(!)  Procalcitonin elevated at 4.51 and consistent with a systemic infection.  Will continue with evaluation and ensure lactate is ordered.   1151 CRP inflammation(!)  CRP markedly elevated at 204.36   1151  and concerning for either acute inflammatory response or acute intra-abdominal process.   1152 Lipase  Lipase is reassuring at 14 and low suspicion for pancreatitis   1152 Erythrocyte sedimentation rate auto  Sed rate normal at 13.  Nonspecific finding   1153 Comprehensive metabolic panel(!)  Comprehensive metabolic panel reveals elevated creatinine at 1.43 and this is double what patient's baseline is of 0.7.  Also noted is  some elevation in AST and  ALT with results being 130 and 126 respectively.  CT scan ordered this will help evaluate for any hepatic biliary cirrhosis or choledocholithiasis   1158 CT Abdomen Pelvis w Contrast  IMPRESSION:   1.  Multiple dilated loops of small bowel are present measuring up to 5.1 cm in diameter. Relatively gradual transition to nondilated bowel is seen in the left lower abdomen. Findings are suggestive of small bowel obstruction.  2.  Mild wall thickening is seen along several loops of small bowel distal to the areas of dilation as well as along the descending and sigmoid colon. Findings are suggestive of enterocolitis. Suggest clinical correlation.  3.  Hepatomegaly.  4.  Stable pulmonary nodules measuring up to 5 mm in the right middle lobe.  5.  5 mm hypoattenuating lesion near the falciform ligament was not definitively seen previously and suspected to reflect focal fatty infiltration given its location. Follow-up characterization could be considered with MRI exam per guidelines below.     1212 Phone consultation with Dr. Lyons obtained, discussed elevated creatinine, CRP, AST, ALT, procalcitonin with normal EBC.  She recommends place an NG tube and monitor patient.  She does not feel this is a surgical case and the hospitalist can call her or surgeon on call if questions.     1214 Requested phone call to consult with hospitalist for admission.   1230 Telephone consultation with hospitalist Dr. Garza-discussed case including NG tube and he agrees patient will need admission.  Discussed antibiotics and at this point will wait until patient becomes free of viral before initiating antibiotics.     Procedures    Results for orders placed or performed during the hospital encounter of 04/25/25 (from the past 24 hours)   CBC with Platelets & Differential    Narrative    The following orders were created for panel order CBC with Platelets & Differential.  Procedure                                Abnormality         Status                     ---------                               -----------         ------                     CBC with platelets and ...[[id number]]                      Final result                 Please view results for these tests on the individual orders.   Comprehensive metabolic panel   Result Value Ref Range    Sodium 137 135 - 145 mmol/L    Potassium 3.5 3.4 - 5.3 mmol/L    Carbon Dioxide (CO2) 26 22 - 29 mmol/L    Anion Gap 11 7 - 15 mmol/L    Urea Nitrogen 29.6 (H) 8.0 - 23.0 mg/dL    Creatinine 1.43 (H) 0.67 - 1.17 mg/dL    GFR Estimate 50 (L) >60 mL/min/1.73m2    Calcium 8.5 (L) 8.8 - 10.4 mg/dL    Chloride 100 98 - 107 mmol/L    Glucose 100 (H) 70 - 99 mg/dL    Alkaline Phosphatase 68 40 - 150 U/L     (H) 0 - 45 U/L     (H) 0 - 70 U/L    Protein Total 6.6 6.4 - 8.3 g/dL    Albumin 3.8 3.5 - 5.2 g/dL    Bilirubin Total 0.4 <=1.2 mg/dL   CBC with platelets and differential   Result Value Ref Range    WBC Count 8.2 4.0 - 11.0 10e3/uL    RBC Count 4.60 4.40 - 5.90 10e6/uL    Hemoglobin 13.8 13.3 - 17.7 g/dL    Hematocrit 41.7 40.0 - 53.0 %    MCV 91 78 - 100 fL    MCH 30.0 26.5 - 33.0 pg    MCHC 33.1 31.5 - 36.5 g/dL    RDW 13.5 10.0 - 15.0 %    Platelet Count 159 150 - 450 10e3/uL    % Neutrophils 76 %    % Lymphocytes 14 %    % Monocytes 8 %    % Eosinophils 2 %    % Basophils 1 %    % Immature Granulocytes 0 %    NRBCs per 100 WBC 0 <1 /100    Absolute Neutrophils 6.2 1.6 - 8.3 10e3/uL    Absolute Lymphocytes 1.2 0.8 - 5.3 10e3/uL    Absolute Monocytes 0.6 0.0 - 1.3 10e3/uL    Absolute Eosinophils 0.2 0.0 - 0.7 10e3/uL    Absolute Basophils 0.0 0.0 - 0.2 10e3/uL    Absolute Immature Granulocytes 0.0 <=0.4 10e3/uL    Absolute NRBCs 0.0 10e3/uL   Lipase   Result Value Ref Range    Lipase 14 13 - 60 U/L   Magnesium   Result Value Ref Range    Magnesium 2.0 1.7 - 2.3 mg/dL   CRP inflammation   Result Value Ref Range    CRP Inflammation 204.36 (H) <5.00 mg/L    Erythrocyte sedimentation rate auto   Result Value Ref Range    Erythrocyte Sedimentation Rate 13 0 - 20 mm/hr   Procalcitonin   Result Value Ref Range    Procalcitonin 4.51 (H) <0.50 ng/mL   CT Abdomen Pelvis w Contrast    Narrative    EXAM: CT ABDOMEN PELVIS W CONTRAST  [facility]ATION: [facility]  DATE: 4/25/2025    INDICATION: hx of bowel obstruction, bladder cancer  4 day hx of abdominal pain, nausea, vomiting, diarrhea, fevers  COMPARISON: CT abdomen and pelvis performed on 1/30/2025  TECHNIQUE: CT scan of the abdomen and pelvis was performed following injection of IV contrast. Multiplanar reformats were obtained. Dose reduction techniques were used.  CONTRAST: 96 mL Isovue 370    FINDINGS:   LOWER CHEST: Multivessel coronary artery calcifications are present. Partially seen vascular calcifications in the thoracic aorta. Small hiatal hernia is present. Subsegmental atelectasis is seen within the lower lungs. Stable pulmonary nodules measuring   up to 5 mm within the right middle lobe (series 2, image 10). Calcified granulomas are present bilaterally.    HEPATOBILIARY: Liver is enlarged measuring 19 cm in craniocaudal length. 5 mm hypoattenuating focus near the falciform ligament was less conspicuous on the prior exam, possibly due to phase of contrast on the prior study (series 2, image 31). Gallbladder   is surgically absent.    PANCREAS: No significant mass, duct dilatation, or inflammatory change.    SPLEEN: Multiple calcified granulomas are seen in the spleen.    ADRENAL GLANDS: Stable mild nodular thickening within the adrenal glands.    KIDNEYS/BLADDER: No hydronephrosis is seen. Stable exophytic cyst along the superior pole of the left kidney. Other subcentimeter hypodense lesions are too small to characterize but statistically likely reflect small cysts and appear unchanged.    BOWEL: Mild wall thickening is seen involving the rectum, sigmoid and descending colon. Multiple  dilated loops of small bowel are seen measuring up to 5.1 cm along the left anterior abdomen. Relatively gradual transition to nondilated bowel is present in   the in the left lower abdomen (series 2, image 57). Mild wall thickening is also noted along several loops of small bowel distal to the areas of dilation. Mild wall thickening is present along the descending and sigmoid colon.    LYMPH NODES: No lymphadenopathy.    VASCULATURE: Moderate to severe vascular calcifications are seen in the abdominal aorta and iliac branches.    PELVIC ORGANS: Obscured due to streak artifact from bilateral hip arthroplasty. Small fat-containing inguinal hernias are present, right greater than left.    MUSCULOSKELETAL: Small fat-containing umbilical hernia is present. Multilevel degenerative changes are present in the spine. Bilateral hip arthroplasties are seen. Partially seen thoracic spinal stimulator leads.      Impression    IMPRESSION:   1.  Multiple dilated loops of small bowel are present measuring up to 5.1 cm in diameter. Relatively gradual transition to nondilated bowel is seen in the left lower abdomen. Findings are suggestive of small bowel obstruction.  2.  Mild wall thickening is seen along several loops of small bowel distal to the areas of dilation as well as along the descending and sigmoid colon. Findings are suggestive of enterocolitis. Suggest clinical correlation.  3.  Hepatomegaly.  4.  Stable pulmonary nodules measuring up to 5 mm in the right middle lobe.  5.  5 mm hypoattenuating lesion near the falciform ligament was not definitively seen previously and suspected to reflect focal fatty infiltration given its location. Follow-up characterization could be considered with MRI exam per guidelines below.    REFERENCE:  Guidelines for Management of Incidental Pulmonary Nodules Detected on CT Images: From the Fleischner Society 2017.   Guidelines apply to incidental nodules in patients who are 35 years or  older.  Guidelines do not apply to lung cancer screening, patients with immunosuppression, or patients with known primary cancer.    MULTIPLE NODULES  Nodule size <6 mm  Low-risk patients: No follow-up needed.  High-risk patients: Optional follow-up at 12 months.    Nodule size 6 mm or larger  Low-risk patients: Follow-up CT at 3-6 months, then consider CT at 18-24 months.  High-risk patients: Follow-up CT at 3-6 months, then at 18-24 months if no change.  -Use most suspicious nodule as guide to management.    Small (0.5 - 1.5 cm) low attenuation liver lesion with suspicious imaging features. 6 month follow-up imaging with MRI/CT recommended to assess changes in size and morphology.    Adapted from Consensus Recommendations, J Am Simon Radiol 2010;7:754-773.     Lactic acid whole blood with 1x repeat in 2 hr when >2   Result Value Ref Range    Lactic Acid, Initial 0.8 0.7 - 2.0 mmol/L   Influenza A/B, RSV and SARS-CoV2 PCR (COVID-19) Nose    Specimen: Nose; Swab   Result Value Ref Range    Influenza A PCR Negative Negative    Influenza B PCR Negative Negative    RSV PCR Negative Negative    SARS CoV2 PCR Negative Negative    Narrative    Testing was performed using the Xpert Xpress CoV2/Flu/RSV Assay on the [employer] GeneXpert Instrument. This test should be ordered for the detection of SARS-CoV2, influenza, and RSV viruses in individuals with signs and symptoms of respiratory tract infection. This test is for in vitro diagnostic use under the [place] FDA for laboratories certified under CLIA to perform high or moderate complexity testing. This test has been [place] FDA cleared. A negative result does not rule out the presence of PCR inhibitors in the specimen or target RNA in concentration below the limit of detection for the assay. If only one viral target is positive but coinfection with multiple targets is suspected, the sample should be re-tested with another FDA cleared, approved, or authorized test, if coninfection would  change clinical management. This test was validated by the [facility]. These laboratories are certified under the Clinical Laboratory Improvement Amendments of 1988 (CLIA-88) as qualified to perfom high complexity laboratory testing.   UA with Microscopic reflex to Culture    Specimen: Urine, Midstream   Result Value Ref Range    Color Urine Light Yellow Colorless, Straw, Light Yellow, Yellow    Appearance Urine Clear Clear    Glucose Urine Negative Negative mg/dL    Bilirubin Urine Negative Negative    Ketones Urine Negative Negative mg/dL    Specific Gravity Urine 1.026 1.003 - 1.035    Blood Urine Trace (A) Negative    pH Urine 5.0 5.0 - 7.0    Protein Albumin Urine Negative Negative mg/dL    Urobilinogen Urine Normal Normal mg/dL    Nitrite Urine Negative Negative    Leukocyte Esterase Urine Negative Negative    Bacteria Urine Few (A) None Seen /HPF    Mucus Urine Present (A) None Seen /LPF    RBC Urine <1 <=2 /HPF    WBC Urine 1 <=5 /HPF    Squamous Epithelials Urine <1 <=1 /HPF    Narrative    Urine Culture not indicated       Medications   ondansetron (ZOFRAN ODT) ODT tab 4 mg (has no administration in time range)     Or   ondansetron (ZOFRAN) injection 4 mg (has no administration in time range)   lactated ringers infusion ( Intravenous $New Bag 4/25/25 1741)   lidocaine 1 % 0.1-1 mL (has no administration in time range)   lidocaine (LMX4) kit (has no administration in time range)   sodium chloride (PF) 0.9% PF flush 3 mL (3 mLs Intracatheter Not Given 4/25/25 1448)   sodium chloride (PF) 0.9% PF flush 3 mL (has no administration in time range)   melatonin tablet 1 mg (has no administration in time range)   prochlorperazine (COMPAZINE) injection 5 mg (has no administration in time range)     Or   prochlorperazine (COMPAZINE) tablet 5 mg (has no administration in time range)   calcium carbonate (TUMS) chewable tablet 1,000 mg (has no administration in time range)   benzocaine-menthol  (CHLORASEPTIC) 6-10 MG lozenge 1 lozenge (has no administration in time range)   miconazole (MICATIN) 2 % powder (has no administration in time range)   oxyCODONE IR (ROXICODONE) half-tab 2.5 mg (has no administration in time range)   oxyCODONE (ROXICODONE) tablet 5 mg (has no administration in time range)   atorvastatin (LIPITOR) tablet 20 mg (20 mg Oral $Given 4/25/25 1741)   finasteride (PROSCAR) tablet 5 mg (has no administration in time range)   hydroCHLOROthiazide tablet 12.5 mg ( Oral Automatically Held 4/29/25 0800)   lisinopril (ZESTRIL) tablet 40 mg ( Oral Automatically Held 4/29/25 0800)   primidone (MYSOLINE) tablet 500 mg (500 mg Oral $Given 4/25/25 1741)   propranolol ER (INDERAL LA) 24 hr capsule 60 mg (has no administration in time range)   tamsulosin (FLOMAX) capsule 0.8 mg (has no administration in time range)   famotidine (PEPCID) injection 20 mg (20 mg Intravenous $Given 4/25/25 1610)   naloxone (NARCAN) injection 0.2 mg (has no administration in time range)     Or   naloxone (NARCAN) injection 0.4 mg (has no administration in time range)     Or   naloxone (NARCAN) injection 0.2 mg (has no administration in time range)     Or   naloxone (NARCAN) injection 0.4 mg (has no administration in time range)   acetaminophen (TYLENOL) tablet 325 mg (has no administration in time range)     Or   acetaminophen (TYLENOL) Suppository 325 mg (has no administration in time range)   lactated ringers BOLUS 1,000 mL (1,000 mLs Intravenous $New Bag 4/25/25 1109)   iopamidol (ISOVUE-370) solution 96 mL (96 mLs Intravenous $Given 4/25/25 1125)   sodium chloride 0.9 % bag for CT scan flush (64 mLs Intravenous $Given 4/25/25 1125)       Assessments & Plan (with Medical Decision Making)     I have reviewed the nursing notes.    I have reviewed the findings, diagnosis, plan and need for follow up with the patient.       ED to Inpatient Handoff:    Discussed with Dr Ba Garza at 1230  Patient accepted for Inpatient  Stay  Pending studies include COVID, influenza, RSV, stool studies including Clostridium C. difficile  Code Status: Not Addressed         Medical Decision Making  The patient's presentation was of high complexity with a history of colon cancer status post radiation, resection in 2020, appendectomy, cholecystectomy, recurrent episodes of small bowel obstruction, bladder wall cancer with transurethral resection of a bladder tumor on February 26, 2025 presenting to the emergency department with a 4-day history of uncontrolled nausea, vomiting, abdominal pain, diarrhea, fevers with highest temp being noted at 102 last evening.  Patient has treated the fevers with Tylenol.  Given the complexity of patient concern for potential viral gastroenteritis, colitis, small bowel obstruction with or without perforation, diverticulitis with or without perforation, malignant mass with all of the above with or without electrolyte derangement, CAITLYN.    The patient's evaluation involved:  Extensive chart review including previous procedures, hospitalizations, laboratory workup, imaging, interventions.  Today includes history and physical examination and ordering of IV fluids, urinalysis, CBC, basic metabolic panel, hepatic panel, lipase, CT abdomen and pelvis.  Interventions focused on hydration and assessment for potential C. difficile, identification of stool virus or bacteria etiology and also potential viral respiratory etiology after discussion with hospitalist.  Also ordered lab work to identify for potential  sepsis including blood cultures.  Considered initiating antibiotic therapy but after discussion with hospitalist we will hold off on this.  Evaluation also involved consultation with general surgeon as well.    The patient's management necessitated ordering imaging with contrast dye, evaluating the need for hospitalization and whether patient is requiring a higher level of care or not.  After discussion with general surgeon  and hospitalist it is felt that patient is a candidate for hospitalization at our facility.  Collaborative care completed with Dr. Олег Rodriguez.  Orders placed for full admission.        Current Discharge Medication List          Final diagnoses:   Abdominal pain   Elevated serum creatinine   Small bowel obstruction (H)   Nausea vomiting and diarrhea       4/25/2025   [facility] EMERGENCY DEPT       Farheen Cottrell APRN CNP  04/25/25 2020

## 2025-04-26 LAB
ADV 40+41 DNA STL QL NAA+NON-PROBE: NEGATIVE
ALBUMIN SERPL BCG-MCNC: 3.5 G/DL (ref 3.5–5.2)
ALP SERPL-CCNC: 57 U/L (ref 40–150)
ALT SERPL W P-5'-P-CCNC: 77 U/L (ref 0–70)
ANION GAP SERPL CALCULATED.3IONS-SCNC: 16 MMOL/L (ref 7–15)
AST SERPL W P-5'-P-CCNC: 53 U/L (ref 0–45)
ASTRO TYP 1-8 RNA STL QL NAA+NON-PROBE: NEGATIVE
BILIRUB SERPL-MCNC: 0.3 MG/DL
BUN SERPL-MCNC: 15.9 MG/DL (ref 8–23)
C CAYETANENSIS DNA STL QL NAA+NON-PROBE: NEGATIVE
C DIFF TOX B STL QL: NEGATIVE
CALCIUM SERPL-MCNC: 8.7 MG/DL (ref 8.8–10.4)
CAMPYLOBACTER DNA SPEC NAA+PROBE: NEGATIVE
CHLORIDE SERPL-SCNC: 103 MMOL/L (ref 98–107)
CREAT SERPL-MCNC: 0.88 MG/DL (ref 0.67–1.17)
CRP SERPL-MCNC: 158.04 MG/L
CRYPTOSP DNA STL QL NAA+NON-PROBE: NEGATIVE
E COLI O157 DNA STL QL NAA+NON-PROBE: ABNORMAL
E HISTOLYT DNA STL QL NAA+NON-PROBE: NEGATIVE
EAEC ASTA GENE ISLT QL NAA+PROBE: NEGATIVE
EC STX1+STX2 GENES STL QL NAA+NON-PROBE: NEGATIVE
EGFRCR SERPLBLD CKD-EPI 2021: 88 ML/MIN/1.73M2
EPEC EAE GENE STL QL NAA+NON-PROBE: NEGATIVE
ERYTHROCYTE [DISTWIDTH] IN BLOOD BY AUTOMATED COUNT: 13 % (ref 10–15)
ETEC LTA+ST1A+ST1B TOX ST NAA+NON-PROBE: POSITIVE
G LAMBLIA DNA STL QL NAA+NON-PROBE: NEGATIVE
GLUCOSE SERPL-MCNC: 74 MG/DL (ref 70–99)
HCO3 SERPL-SCNC: 21 MMOL/L (ref 22–29)
HCT VFR BLD AUTO: 39.1 % (ref 40–53)
HGB BLD-MCNC: 12.8 G/DL (ref 13.3–17.7)
INR PPP: 1.15 (ref 0.85–1.15)
MCH RBC QN AUTO: 29.6 PG (ref 26.5–33)
MCHC RBC AUTO-ENTMCNC: 32.7 G/DL (ref 31.5–36.5)
MCV RBC AUTO: 91 FL (ref 78–100)
NOROVIRUS GI+II RNA STL QL NAA+NON-PROBE: POSITIVE
P SHIGELLOIDES DNA STL QL NAA+NON-PROBE: NEGATIVE
PLATELET # BLD AUTO: 146 10E3/UL (ref 150–450)
POTASSIUM SERPL-SCNC: 3 MMOL/L (ref 3.4–5.3)
POTASSIUM SERPL-SCNC: 3.6 MMOL/L (ref 3.4–5.3)
PROCALCITONIN SERPL IA-MCNC: 2.05 NG/ML
PROT SERPL-MCNC: 5.9 G/DL (ref 6.4–8.3)
RBC # BLD AUTO: 4.32 10E6/UL (ref 4.4–5.9)
RVA RNA STL QL NAA+NON-PROBE: NEGATIVE
SALMONELLA SP RPOD STL QL NAA+PROBE: NEGATIVE
SAPO I+II+IV+V RNA STL QL NAA+NON-PROBE: NEGATIVE
SHIGELLA SP+EIEC IPAH ST NAA+NON-PROBE: NEGATIVE
SODIUM SERPL-SCNC: 140 MMOL/L (ref 135–145)
V CHOLERAE DNA SPEC QL NAA+PROBE: NEGATIVE
VIBRIO DNA SPEC NAA+PROBE: NEGATIVE
WBC # BLD AUTO: 4.1 10E3/UL (ref 4–11)
Y ENTEROCOL DNA STL QL NAA+PROBE: NEGATIVE

## 2025-04-26 PROCEDURE — 258N000003 HC RX IP 258 OP 636: Performed by: INTERNAL MEDICINE

## 2025-04-26 PROCEDURE — 85018 HEMOGLOBIN: CPT

## 2025-04-26 PROCEDURE — 250N000013 HC RX MED GY IP 250 OP 250 PS 637

## 2025-04-26 PROCEDURE — 85610 PROTHROMBIN TIME: CPT

## 2025-04-26 PROCEDURE — 36415 COLL VENOUS BLD VENIPUNCTURE: CPT

## 2025-04-26 PROCEDURE — 250N000013 HC RX MED GY IP 250 OP 250 PS 637: Performed by: INTERNAL MEDICINE

## 2025-04-26 PROCEDURE — 120N000001 HC R&B MED SURG/OB

## 2025-04-26 PROCEDURE — 87507 IADNA-DNA/RNA PROBE TQ 12-25: CPT | Performed by: NURSE PRACTITIONER

## 2025-04-26 PROCEDURE — 99232 SBSQ HOSP IP/OBS MODERATE 35: CPT | Performed by: INTERNAL MEDICINE

## 2025-04-26 PROCEDURE — 250N000011 HC RX IP 250 OP 636: Performed by: NURSE PRACTITIONER

## 2025-04-26 PROCEDURE — 84132 ASSAY OF SERUM POTASSIUM: CPT | Performed by: INTERNAL MEDICINE

## 2025-04-26 PROCEDURE — 84145 PROCALCITONIN (PCT): CPT

## 2025-04-26 PROCEDURE — 258N000003 HC RX IP 258 OP 636: Performed by: NURSE PRACTITIONER

## 2025-04-26 PROCEDURE — 86140 C-REACTIVE PROTEIN: CPT

## 2025-04-26 PROCEDURE — 87493 C DIFF AMPLIFIED PROBE: CPT | Performed by: NURSE PRACTITIONER

## 2025-04-26 PROCEDURE — 84155 ASSAY OF PROTEIN SERUM: CPT

## 2025-04-26 PROCEDURE — 36415 COLL VENOUS BLD VENIPUNCTURE: CPT | Performed by: INTERNAL MEDICINE

## 2025-04-26 RX ORDER — POTASSIUM CHLORIDE 1500 MG/1
40 TABLET, EXTENDED RELEASE ORAL ONCE
Status: COMPLETED | OUTPATIENT
Start: 2025-04-26 | End: 2025-04-26

## 2025-04-26 RX ORDER — POTASSIUM CHLORIDE 1500 MG/1
20 TABLET, EXTENDED RELEASE ORAL ONCE
Status: COMPLETED | OUTPATIENT
Start: 2025-04-26 | End: 2025-04-26

## 2025-04-26 RX ADMIN — PRIMIDONE 500 MG: 50 TABLET ORAL at 16:51

## 2025-04-26 RX ADMIN — POTASSIUM CHLORIDE 40 MEQ: 1500 TABLET, EXTENDED RELEASE ORAL at 16:51

## 2025-04-26 RX ADMIN — ATORVASTATIN CALCIUM 20 MG: 20 TABLET, FILM COATED ORAL at 16:52

## 2025-04-26 RX ADMIN — SODIUM CHLORIDE, SODIUM LACTATE, POTASSIUM CHLORIDE, AND CALCIUM CHLORIDE: .6; .31; .03; .02 INJECTION, SOLUTION INTRAVENOUS at 01:14

## 2025-04-26 RX ADMIN — PROPRANOLOL HYDROCHLORIDE 60 MG: 60 CAPSULE, EXTENDED RELEASE ORAL at 09:21

## 2025-04-26 RX ADMIN — FAMOTIDINE 20 MG: 10 INJECTION, SOLUTION INTRAVENOUS at 15:48

## 2025-04-26 RX ADMIN — FINASTERIDE 5 MG: 5 TABLET, FILM COATED ORAL at 08:46

## 2025-04-26 RX ADMIN — SODIUM CHLORIDE, SODIUM LACTATE, POTASSIUM CHLORIDE, AND CALCIUM CHLORIDE: .6; .31; .03; .02 INJECTION, SOLUTION INTRAVENOUS at 15:51

## 2025-04-26 RX ADMIN — POTASSIUM CHLORIDE 20 MEQ: 1500 TABLET, EXTENDED RELEASE ORAL at 18:32

## 2025-04-26 RX ADMIN — TAMSULOSIN HYDROCHLORIDE 0.8 MG: 0.4 CAPSULE ORAL at 08:46

## 2025-04-26 RX ADMIN — SODIUM CHLORIDE, SODIUM LACTATE, POTASSIUM CHLORIDE, AND CALCIUM CHLORIDE: .6; .31; .03; .02 INJECTION, SOLUTION INTRAVENOUS at 07:05

## 2025-04-26 ASSESSMENT — ACTIVITIES OF DAILY LIVING (ADL)
ADLS_ACUITY_SCORE: 32
ADLS_ACUITY_SCORE: 32
ADLS_ACUITY_SCORE: 38
ADLS_ACUITY_SCORE: 32
ADLS_ACUITY_SCORE: 38
ADLS_ACUITY_SCORE: 32
ADLS_ACUITY_SCORE: 38
ADLS_ACUITY_SCORE: 37
ADLS_ACUITY_SCORE: 41
ADLS_ACUITY_SCORE: 38
ADLS_ACUITY_SCORE: 41
ADLS_ACUITY_SCORE: 37
ADLS_ACUITY_SCORE: 38
ADLS_ACUITY_SCORE: 38
ADLS_ACUITY_SCORE: 37
ADLS_ACUITY_SCORE: 38
ADLS_ACUITY_SCORE: 41
ADLS_ACUITY_SCORE: 41
ADLS_ACUITY_SCORE: 32

## 2025-04-26 NOTE — PLAN OF CARE
"Goal Outcome Evaluation:      Plan of Care Reviewed With: patient    Overall Patient Progress: improvingOverall Patient Progress: improving    Outcome Evaluation: Pt A&Ox4. Able to make needs known. Denies abdominal pain. Up SBA . Diet advanced to clear liquids.    Visit Vitals  BP (!) 155/70 (BP Location: Right arm)   Pulse 63   Temp 97.1  F (36.2  C) (Oral)   Resp 17       Problem: Adult Inpatient Plan of Care  Goal: Plan of Care Review  Description: The Plan of Care Review/Shift note should be completed every shift.  The Outcome Evaluation is a brief statement about your assessment that the patient is improving, declining, or no change.  This information will be displayed automatically on your shiftnote.  Outcome: Progressing  Flowsheets (Taken 4/26/2025 1106)  Outcome Evaluation: Pt A&Ox4. Able to make needs known. Denies abdominal pain. Up SBA . Diet advanced to clear liquids.  Plan of Care Reviewed With: patient  Overall Patient Progress: improving  Goal: Patient-Specific Goal (Individualized)  Description: You can add care plan individualizations to a care plan. Examples of Individualization might be:  \"Parent requests to be called daily at 9am for status\", \"I have a hard time hearing out of my right ear\", or \"Do not touch me to wake me up as it startlesme\".  Outcome: Progressing  Goal: Absence of Hospital-Acquired Illness or Injury  Outcome: Progressing  Intervention: Identify and Manage Fall Risk  Recent Flowsheet Documentation  Taken 4/26/2025 0846 by Grace Alvarado RN  Safety Promotion/Fall Prevention:   activity supervised   assistive device/personal items within reach   clutter free environment maintained   nonskid shoes/slippers when out of bed   patient and family education   safety round/check completed   supervised activity  Intervention: Prevent and Manage VTE (Venous Thromboembolism) Risk  Recent Flowsheet Documentation  Taken 4/26/2025 0846 by Grace Alvarado RN  VTE " Prevention/Management:   SCDs off (sequential compression devices)   other (see comments)  Goal: Optimal Comfort and Wellbeing  Outcome: Progressing  Goal: Readiness for Transition of Care  Outcome: Progressing

## 2025-04-26 NOTE — PLAN OF CARE
Goal Outcome Evaluation:  Problem: Adult Inpatient Plan of Care  Goal: Optimal Comfort and Wellbeing  Outcome: Progressing  He reports generalized abdominal pain on palpitation 5/10 and headache. Given Tylenol 325 mg oral.

## 2025-04-26 NOTE — PLAN OF CARE
Goal Outcome Evaluation:  Problem: Adult Inpatient Plan of Care  Goal: Optimal Comfort and Wellbeing  4/26/2025 0656 by Akiko Garza, RN  Outcome: Progressing  4/25/2025 2248 by Akiko Garza, RN  Outcome: Progressing  Intervention: Provide Person-Centered Care  Recent Flowsheet Documentation  Taken 4/25/2025 2300 by Akiko Garza, RN  Trust Relationship/Rapport: care explained  Patient had only 2 tiny/smears overnight. He has just now been incontinent of a small amount of loose stool but not enough to collect. He also had another smear in collection hat but amount to small to collect.

## 2025-04-26 NOTE — PROGRESS NOTES
[facility] Medicine Progress Note  Date of Service (when I saw the patient): 04/26/2025    REASON FOR ADMISSION / INTERVAL HISTORY:  Peña Hall is a 80 year old male with past medical hx of bladder cancer s/p TURBT, BPH, HTN, HLD admitted on 4/25/2025. He presented for nausea, vomiting, diarrhea for 3 days.   No more n/v/d. Feels better.       Assessment/ Plan     SBO  Hx of SBO    N/v/d started 4/22 with intermittent crampy abdominal pain. States this is the 3rd time he's had n/v/d in the last 2 months, but didn't seek care for the other times. The other episodes lasted ~2 days.     CT showed findings suggestive of SBO and enterocolitis. Hx of SBO's, last one 10/2023. NGT was unsuccessfully placed in ER. Patient has not had vomiting since 0700 on 4/25, so  withheld  further placement for now.  Was started on LR 150ml/hr. Passibg gas/ no more sx at this time.  -will start clear liq diet and see.     Enterocolitis  CT with findings of enterocolitis. , procal 4.51. Does not meet SIRS criteria- no leukocytosis, tachycardia, or fever. Patient reported a fever of 102 on 4/24.   CRP decreasing-158 now. PCT 2.05. Blood cultures pending. Enteric panel and C. Diff ordered but no diarrhea since admission  -continue fluids/ po clears/ follow labs in AM     CAITLYN  Creatinine 1.43 on admission, baseline 0.7-0.9. Likely due to n/v/d.  Resolved     Hypokalemia  -replace per protocol     Hepatomegaly  Elevated LFTs  Thrombocytopenia   Incidental finding on CT.  and  on admission. Plt 146. ? ETOH use vs current infectious etiology  Labs improving. Continue to follow     Bladder cancer s/p TURBT 2/26/25  BPH  Pathology showed low grade non-invasive papillary urothelial carcinoma.  - Continued PTA Finasteride 5mg, Tamsulosin 0.8mg     HTN  Stable  - resume PTA hydrochlorothiazide 12.5mg and Lisinopril 40mg for CAITLYN     Essential tremor  - Continued PTA Propranolol 60mg  daily, Primidone 500mg at bedtime      HLD  - Continued PTA Atorvastatin 20mg     Hx of colon cancer s/p colectomy 2000  Noted.     Chronic back pain s/p spinal cord stimulator implant  Implanted 1/2024     Abnormal lesion  Incidental finding on CT as 5 mm hypoattenuating lesion near the falciform ligament suspected to reflect focal fatty infiltration.    - Recommend MRI or CT in 6 months.       Diet: Clear Liquid Diet    DVT Prophylaxis: Ambulate every shift  Shaffer Catheter: Not present  Code Status: Full Code      Medically Ready for Discharge: Anticipated Tomorrow        CHELSEA JACKSON MD   Pg [phone]        ROS:  As described in A/P and Exam.  Otherwise ALL are  negative.    PHYSICAL EXAM:  All vitals have been reviewed    Blood pressure (!) 155/70, pulse 63, temperature 97.1  F (36.2  C), temperature source Oral, resp. rate 17, SpO2 95%.    No intake/output data recorded.    GENERAL APPEARANCE: healthy, alert and no distress  EYES: conjunctiva clear, eyes grossly normal  HENT: external ears and nose normal   RESP: lungs clear to auscultation - no rales, rhonchi or wheezes  CV: regular rate and rhythm, normal S1 S2, no S3 or S4 and no murmur, click or rub   ABDOMEN: soft, nontender, no HSM or masses and bowel sounds normal  MS: no clubbing, cyanosis; no edema  SKIN: clear without significant rashes or lesions  NEURO: -non-focal moves all 4 extr    ROUTINE  LABS (Last four results)  CMP  Recent Labs   Lab 04/26/25  0527 04/25/25  1013    137   POTASSIUM 3.0* 3.5   CHLORIDE 103 100   CO2 21* 26   ANIONGAP 16* 11   GLC 74 100*   BUN 15.9 29.6*   CR 0.88 1.43*   GFRESTIMATED 88 50*   SARAVANAN 8.7* 8.5*   MAG  --  2.0   PROTTOTAL 5.9* 6.6   ALBUMIN 3.5 3.8   BILITOTAL 0.3 0.4   ALKPHOS 57 68   AST 53* 130*   ALT 77* 126*     CBC  Recent Labs   Lab 04/26/25  0527 04/25/25  1013   WBC 4.1 8.2   RBC 4.32* 4.60   HGB 12.8* 13.8   HCT 39.1* 41.7   MCV 91 91   MCH 29.6 30.0   MCHC 32.7 33.1   RDW 13.0 13.5   *  159     INR  Recent Labs   Lab 04/26/25  0527   INR 1.15     Arterial Blood GasNo lab results found in last 7 days.    No results found for this or any previous visit (from the past 24 hours).

## 2025-04-27 LAB
ALBUMIN SERPL BCG-MCNC: 3.5 G/DL (ref 3.5–5.2)
ALP SERPL-CCNC: 57 U/L (ref 40–150)
ALT SERPL W P-5'-P-CCNC: 55 U/L (ref 0–70)
ANION GAP SERPL CALCULATED.3IONS-SCNC: 13 MMOL/L (ref 7–15)
AST SERPL W P-5'-P-CCNC: 31 U/L (ref 0–45)
BASOPHILS # BLD AUTO: 0 10E3/UL (ref 0–0.2)
BASOPHILS NFR BLD AUTO: 1 %
BILIRUB SERPL-MCNC: 0.3 MG/DL
BUN SERPL-MCNC: 8.4 MG/DL (ref 8–23)
CALCIUM SERPL-MCNC: 8.7 MG/DL (ref 8.8–10.4)
CHLORIDE SERPL-SCNC: 104 MMOL/L (ref 98–107)
CREAT SERPL-MCNC: 0.79 MG/DL (ref 0.67–1.17)
CRP SERPL-MCNC: 67.39 MG/L
EGFRCR SERPLBLD CKD-EPI 2021: >90 ML/MIN/1.73M2
EOSINOPHIL # BLD AUTO: 0.1 10E3/UL (ref 0–0.7)
EOSINOPHIL NFR BLD AUTO: 2 %
ERYTHROCYTE [DISTWIDTH] IN BLOOD BY AUTOMATED COUNT: 12.8 % (ref 10–15)
GLUCOSE SERPL-MCNC: 91 MG/DL (ref 70–99)
HCO3 SERPL-SCNC: 22 MMOL/L (ref 22–29)
HCT VFR BLD AUTO: 40 % (ref 40–53)
HGB BLD-MCNC: 13.4 G/DL (ref 13.3–17.7)
IMM GRANULOCYTES # BLD: 0 10E3/UL
IMM GRANULOCYTES NFR BLD: 0 %
LYMPHOCYTES # BLD AUTO: 1.1 10E3/UL (ref 0.8–5.3)
LYMPHOCYTES NFR BLD AUTO: 26 %
MCH RBC QN AUTO: 29.9 PG (ref 26.5–33)
MCHC RBC AUTO-ENTMCNC: 33.5 G/DL (ref 31.5–36.5)
MCV RBC AUTO: 89 FL (ref 78–100)
MONOCYTES # BLD AUTO: 0.4 10E3/UL (ref 0–1.3)
MONOCYTES NFR BLD AUTO: 10 %
NEUTROPHILS # BLD AUTO: 2.5 10E3/UL (ref 1.6–8.3)
NEUTROPHILS NFR BLD AUTO: 61 %
NRBC # BLD AUTO: 0 10E3/UL
NRBC BLD AUTO-RTO: 0 /100
PLATELET # BLD AUTO: 159 10E3/UL (ref 150–450)
POTASSIUM SERPL-SCNC: 3.6 MMOL/L (ref 3.4–5.3)
PROCALCITONIN SERPL IA-MCNC: 1.15 NG/ML
PROT SERPL-MCNC: 5.9 G/DL (ref 6.4–8.3)
RBC # BLD AUTO: 4.48 10E6/UL (ref 4.4–5.9)
SODIUM SERPL-SCNC: 139 MMOL/L (ref 135–145)
WBC # BLD AUTO: 4.2 10E3/UL (ref 4–11)

## 2025-04-27 PROCEDURE — 86140 C-REACTIVE PROTEIN: CPT | Performed by: INTERNAL MEDICINE

## 2025-04-27 PROCEDURE — 85004 AUTOMATED DIFF WBC COUNT: CPT | Performed by: INTERNAL MEDICINE

## 2025-04-27 PROCEDURE — 250N000013 HC RX MED GY IP 250 OP 250 PS 637: Performed by: INTERNAL MEDICINE

## 2025-04-27 PROCEDURE — 258N000003 HC RX IP 258 OP 636: Performed by: INTERNAL MEDICINE

## 2025-04-27 PROCEDURE — 99232 SBSQ HOSP IP/OBS MODERATE 35: CPT | Performed by: INTERNAL MEDICINE

## 2025-04-27 PROCEDURE — 36415 COLL VENOUS BLD VENIPUNCTURE: CPT | Performed by: INTERNAL MEDICINE

## 2025-04-27 PROCEDURE — 250N000013 HC RX MED GY IP 250 OP 250 PS 637

## 2025-04-27 PROCEDURE — 82040 ASSAY OF SERUM ALBUMIN: CPT | Performed by: INTERNAL MEDICINE

## 2025-04-27 PROCEDURE — 120N000001 HC R&B MED SURG/OB

## 2025-04-27 PROCEDURE — 84145 PROCALCITONIN (PCT): CPT | Performed by: INTERNAL MEDICINE

## 2025-04-27 PROCEDURE — 250N000011 HC RX IP 250 OP 636: Performed by: NURSE PRACTITIONER

## 2025-04-27 RX ADMIN — PROPRANOLOL HYDROCHLORIDE 60 MG: 60 CAPSULE, EXTENDED RELEASE ORAL at 08:24

## 2025-04-27 RX ADMIN — SODIUM CHLORIDE, SODIUM LACTATE, POTASSIUM CHLORIDE, AND CALCIUM CHLORIDE: .6; .31; .03; .02 INJECTION, SOLUTION INTRAVENOUS at 14:49

## 2025-04-27 RX ADMIN — PRIMIDONE 500 MG: 50 TABLET ORAL at 17:15

## 2025-04-27 RX ADMIN — FAMOTIDINE 20 MG: 10 INJECTION, SOLUTION INTRAVENOUS at 15:50

## 2025-04-27 RX ADMIN — SODIUM CHLORIDE, SODIUM LACTATE, POTASSIUM CHLORIDE, AND CALCIUM CHLORIDE: .6; .31; .03; .02 INJECTION, SOLUTION INTRAVENOUS at 02:27

## 2025-04-27 RX ADMIN — ATORVASTATIN CALCIUM 20 MG: 20 TABLET, FILM COATED ORAL at 17:15

## 2025-04-27 RX ADMIN — TAMSULOSIN HYDROCHLORIDE 0.8 MG: 0.4 CAPSULE ORAL at 08:12

## 2025-04-27 RX ADMIN — HYDROCHLOROTHIAZIDE 12.5 MG: 12.5 TABLET ORAL at 08:13

## 2025-04-27 RX ADMIN — FINASTERIDE 5 MG: 5 TABLET, FILM COATED ORAL at 08:13

## 2025-04-27 RX ADMIN — LISINOPRIL 40 MG: 40 TABLET ORAL at 08:13

## 2025-04-27 ASSESSMENT — ACTIVITIES OF DAILY LIVING (ADL)
ADLS_ACUITY_SCORE: 42
ADLS_ACUITY_SCORE: 38
ADLS_ACUITY_SCORE: 38
ADLS_ACUITY_SCORE: 42
ADLS_ACUITY_SCORE: 38
ADLS_ACUITY_SCORE: 42
ADLS_ACUITY_SCORE: 38
ADLS_ACUITY_SCORE: 42
ADLS_ACUITY_SCORE: 38
ADLS_ACUITY_SCORE: 42

## 2025-04-27 NOTE — PLAN OF CARE
Problem: Adult Inpatient Plan of Care  Goal: Absence of Hospital-Acquired Illness or Injury  Intervention: Identify and Manage Fall Risk  Recent Flowsheet Documentation  Taken 4/27/2025 0440 by Kayce Chicas RN  Safety Promotion/Fall Prevention:   clutter free environment maintained   nonskid shoes/slippers when out of bed   safety round/check completed   supervised activity  Intervention: Prevent Skin Injury  Recent Flowsheet Documentation  Taken 4/27/2025 0440 by Kayce Chicas RN  Body Position: position changed independently  Intervention: Prevent and Manage VTE (Venous Thromboembolism) Risk  Recent Flowsheet Documentation  Taken 4/27/2025 0440 by Kayce Chicas RN  VTE Prevention/Management: SCDs off (sequential compression devices)  Intervention: Prevent Infection  Recent Flowsheet Documentation  Taken 4/27/2025 0440 by Kayce Chicas RN  Infection Prevention: rest/sleep promoted  Goal: Optimal Comfort and Wellbeing  Intervention: Provide Person-Centered Care  Recent Flowsheet Documentation  Taken 4/27/2025 0440 by Kayce Chicas RN  Trust Relationship/Rapport:   care explained   choices provided   questions answered   questions encouraged     Problem: Delirium  Goal: Improved Behavioral Control  Intervention: Minimize Safety Risk  Recent Flowsheet Documentation  Taken 4/27/2025 0440 by Kayce Chicas RN  Trust Relationship/Rapport:   care explained   choices provided   questions answered   questions encouraged  Goal: Improved Attention and Thought Clarity  Intervention: Maximize Cognitive Function  Recent Flowsheet Documentation  Taken 4/27/2025 0440 by Kayce Chicas RN  Sensory Stimulation Regulation:   care clustered   lighting decreased   quiet environment promoted     Problem: Fall Injury Risk  Goal: Absence of Fall and Fall-Related Injury  Outcome: Progressing  Intervention: Promote Injury-Free Environment  Recent Flowsheet Documentation  Taken 4/27/2025 0440 by Kayce Chicas RN  Safety  Promotion/Fall Prevention:   clutter free environment maintained   nonskid shoes/slippers when out of bed   safety round/check completed   supervised activity   Goal Outcome Evaluation:       Pt is alert and oriented X3. VSS on RA, denies pain, and N/V. Had a couple BM, but stool is more firm per pt. IVF infusing. K+ recheck 3.6. Up with SBA to the bathroom. Will continue to monitor.

## 2025-04-27 NOTE — PLAN OF CARE
Goal Outcome Evaluation:      Plan of Care Reviewed With: patient    Overall Patient Progress: improvingOverall Patient Progress: improving    Outcome Evaluation: Patient continues to do well, denies pain or N/V and is tolerating full liquid diet though has continued to have loose/diarrhea stools every time he's been up to the bathroom today.  Plan is to advance diet tomorrow morning and likely discharge to home later in the day.

## 2025-04-27 NOTE — PROGRESS NOTES
[facility] Medicine Progress Note  Date of Service (when I saw the patient): 04/27/2025    REASON FOR ADMISSION / INTERVAL HISTORY:  Peña Hall is a 80 year old male with past medical hx of bladder cancer s/p TURBT, BPH, HTN, HLD admitted on 4/25/2025. He presented for nausea, vomiting, diarrhea for 3 days.   No more nausea/ vomiting. Does have diarrhea-4 stools this AM. Abd pain better.       Assessment/ Plan     SBO  Hx of SBO    N/v/d started 4/22 with intermittent crampy abdominal pain. States this is the 3rd time he's had n/v/d in the last 2 months, but didn't seek care for the other times. The other episodes lasted ~2 days.     CT showed findings suggestive of SBO and enterocolitis. Hx of SBO's, last one 10/2023. NGT was unsuccessfully placed in ER. Patient has not had vomiting since 0700 on 4/25, so  withheld  further placement for now.  Was started on LR 150ml/hr. Stool panel positive for norovirus/ ETEC. Pain better-mikaela full liquid diet. Does have diarrhea-4 stools this AM  -continue full liquid diet.      Enterocolitis due to ETEC/ norovirus  CT with findings of enterocolitis. , procal 4.51. Does not meet SIRS criteria- no leukocytosis, tachycardia, or fever. Patient reported a fever of 102 on 4/24.   CRP decreasing-67 now. PCT improving 1.15. Blood cultures pending.   -continue full liq diet     CAITLYN  Creatinine 1.43 on admission, baseline 0.7-0.9. Likely due to n/v/d.  Resolved     Hypokalemia  -replace per protocol     Hepatomegaly  Elevated LFTs  Thrombocytopenia   Incidental finding on CT.  and  on admission. Plt 146. ? ETOH use vs current infectious etiology  Labs improving. Continue to follow     Bladder cancer s/p TURBT 2/26/25  BPH  Pathology showed low grade non-invasive papillary urothelial carcinoma.  - Continued PTA Finasteride 5mg, Tamsulosin 0.8mg     HTN  Stable  - continue  PTA hydrochlorothiazide 12.5mg and Lisinopril 40mg  "for CAITLYN     Essential tremor  - Continued PTA Propranolol 60mg daily, Primidone 500mg at bedtime      HLD  - Continued PTA Atorvastatin 20mg     Hx of colon cancer s/p colectomy 2000  Noted.     Chronic back pain s/p spinal cord stimulator implant  Implanted 1/2024     Abnormal lesion  Incidental finding on CT as 5 mm hypoattenuating lesion near the falciform ligament suspected to reflect focal fatty infiltration.    - Recommend MRI or CT in 6 months.       Diet: Full Liquid Diet    DVT Prophylaxis: Ambulate every shift  Shaffer Catheter: Not present  Code Status: Full Code      Medically Ready for Discharge: Anticipated Tomorrow        CHELSEA JACKSON MD   Pg [phone]        ROS:  As described in A/P and Exam.  Otherwise ALL are  negative.    PHYSICAL EXAM:  All vitals have been reviewed    Blood pressure (!) 156/68, pulse (!) 49, temperature 98.5  F (36.9  C), temperature source Oral, resp. rate 18, height 1.676 m (5' 6\"), weight 90 kg (198 lb 6.6 oz), SpO2 96%.    I/O this shift:  In: 600 [P.O.:600]  Out: -     GENERAL APPEARANCE: healthy, alert and no distress  EYES: conjunctiva clear, eyes grossly normal  HENT: external ears and nose normal   RESP: lungs clear to auscultation - no rales, rhonchi or wheezes  CV: regular rate and rhythm, normal S1 S2, no S3 or S4 and no murmur, click or rub   ABDOMEN: soft, nontender, no HSM or masses and bowel sounds normal  MS: no clubbing, cyanosis; no edema  SKIN: clear without significant rashes or lesions  NEURO: -non-focal moves all 4 extr    ROUTINE  LABS (Last four results)  CMP  Recent Labs   Lab 04/27/25  0522 04/26/25  2245 04/26/25  0527 04/25/25  1013     --  140 137   POTASSIUM 3.6 3.6 3.0* 3.5   CHLORIDE 104  --  103 100   CO2 22  --  21* 26   ANIONGAP 13  --  16* 11   GLC 91  --  74 100*   BUN 8.4  --  15.9 29.6*   CR 0.79  --  0.88 1.43*   GFRESTIMATED >90  --  88 50*   SARAVANAN 8.7*  --  8.7* 8.5*   MAG  --   --   --  2.0   PROTTOTAL 5.9*  --  5.9* 6.6   ALBUMIN " 3.5  --  3.5 3.8   BILITOTAL 0.3  --  0.3 0.4   ALKPHOS 57  --  57 68   AST 31  --  53* 130*   ALT 55  --  77* 126*     CBC  Recent Labs   Lab 04/27/25 0522 04/26/25 0527 04/25/25  1013   WBC 4.2 4.1 8.2   RBC 4.48 4.32* 4.60   HGB 13.4 12.8* 13.8   HCT 40.0 39.1* 41.7   MCV 89 91 91   MCH 29.9 29.6 30.0   MCHC 33.5 32.7 33.1   RDW 12.8 13.0 13.5    146* 159     INR  Recent Labs   Lab 04/26/25 0527   INR 1.15     Arterial Blood GasNo lab results found in last 7 days.    No results found for this or any previous visit (from the past 24 hours).

## 2025-04-27 NOTE — PLAN OF CARE
"Goal Outcome Evaluation:      Plan of Care Reviewed With: patient    Overall Patient Progress: improvingOverall Patient Progress: improving    Outcome Evaluation: Pt A&Ox4. Able to make needs known. Denies abdominal pain. Reports chronic hand soreness due to arthritis but states \"I'm alright, my hands are always sore.\" and does not rate pain: Medication offered and refused.  States he ate liquid diet yesterday \"but it went right through me.\" Up SBA    Visit Vitals  BP (!) 173/66 (BP Location: Right arm)   Pulse (!) 47   Temp 97.8  F (36.6  C) (Oral)   Resp 16       Problem: Adult Inpatient Plan of Care  Goal: Plan of Care Review  Description: The Plan of Care Review/Shift note should be completed every shift.  The Outcome Evaluation is a brief statement about your assessment that the patient is improving, declining, or no change.  This information will be displayed automatically on your shiftnote.  Outcome: Progressing  Flowsheets (Taken 4/27/2025 7808)  Outcome Evaluation: Pt A&Ox4. Able to make needs known. Denies abdominal pain. Reports chronic hand soreness due to arthritis but states \"I'm alright, my hands are always sore.\" and does not rate pain: Medication offered and refused.  States he ate liquid diet yesterday \"but it went right through me.\" Up SBA  Plan of Care Reviewed With: patient  Overall Patient Progress: improving  Goal: Patient-Specific Goal (Individualized)  Description: You can add care plan individualizations to a care plan. Examples of Individualization might be:  \"Parent requests to be called daily at 9am for status\", \"I have a hard time hearing out of my right ear\", or \"Do not touch me to wake me up as it startlesme\".  Outcome: Progressing  Goal: Absence of Hospital-Acquired Illness or Injury  Outcome: Progressing  Goal: Optimal Comfort and Wellbeing  Outcome: Progressing  Intervention: Monitor Pain and Promote Comfort  Recent Flowsheet Documentation  Taken 4/27/2025 0816 by Ronnie Ruby, " "Grace MARRERO RN  Pain Management Interventions: (pt reports chronic hand soreness due to arthritis but states \"I'm alright, my hands are always sore.\" and does not rate pain.) medication offered but refused  Goal: Readiness for Transition of Care  Outcome: Progressing     "

## 2025-04-28 VITALS
RESPIRATION RATE: 18 BRPM | BODY MASS INDEX: 31.89 KG/M2 | SYSTOLIC BLOOD PRESSURE: 121 MMHG | TEMPERATURE: 97.5 F | DIASTOLIC BLOOD PRESSURE: 67 MMHG | OXYGEN SATURATION: 89 % | WEIGHT: 198.41 LBS | HEIGHT: 66 IN | HEART RATE: 96 BPM

## 2025-04-28 LAB — POTASSIUM SERPL-SCNC: 3.5 MMOL/L (ref 3.4–5.3)

## 2025-04-28 PROCEDURE — 99239 HOSP IP/OBS DSCHRG MGMT >30: CPT | Performed by: STUDENT IN AN ORGANIZED HEALTH CARE EDUCATION/TRAINING PROGRAM

## 2025-04-28 PROCEDURE — 250N000013 HC RX MED GY IP 250 OP 250 PS 637: Performed by: INTERNAL MEDICINE

## 2025-04-28 PROCEDURE — 250N000013 HC RX MED GY IP 250 OP 250 PS 637

## 2025-04-28 PROCEDURE — 84132 ASSAY OF SERUM POTASSIUM: CPT | Performed by: INTERNAL MEDICINE

## 2025-04-28 PROCEDURE — 36415 COLL VENOUS BLD VENIPUNCTURE: CPT | Performed by: INTERNAL MEDICINE

## 2025-04-28 PROCEDURE — 258N000003 HC RX IP 258 OP 636: Performed by: INTERNAL MEDICINE

## 2025-04-28 RX ADMIN — TAMSULOSIN HYDROCHLORIDE 0.8 MG: 0.4 CAPSULE ORAL at 08:14

## 2025-04-28 RX ADMIN — FINASTERIDE 5 MG: 5 TABLET, FILM COATED ORAL at 08:11

## 2025-04-28 RX ADMIN — SODIUM CHLORIDE, SODIUM LACTATE, POTASSIUM CHLORIDE, AND CALCIUM CHLORIDE: .6; .31; .03; .02 INJECTION, SOLUTION INTRAVENOUS at 03:01

## 2025-04-28 RX ADMIN — LISINOPRIL 40 MG: 40 TABLET ORAL at 08:11

## 2025-04-28 RX ADMIN — HYDROCHLOROTHIAZIDE 12.5 MG: 12.5 TABLET ORAL at 08:11

## 2025-04-28 ASSESSMENT — ACTIVITIES OF DAILY LIVING (ADL)
ADLS_ACUITY_SCORE: 42

## 2025-04-28 NOTE — DISCHARGE INSTRUCTIONS
The [employer]astro[employer] Association (AGA) recommends avoiding fibrous, plant-based foods such as raw fruits and vegetables due to their texture. Instead, these foods should be cooked and processed to a soft consistency, and patients should be advised to chew thoroughly to reduce the risk of obstruction.  Your diet should focus on increasing dietary intake by at least 50% of estimated needs, divided into 5-6 meals per day. For those with a colon in continuity, a high-carbohydrate (60%), low-fat (20%) diet is recommended to improve energy absorption and reduce fecal calorie loss. Additionally, oxalate restriction is essential to prevent oxalate stones.     Example diet    Breakfast: Scrambled eggs, smooth oatmeal (cooked thoroughly), applesauce  Snack: Greek yogurt, banana (mashed)  Lunch: Baked chicken breast (shredded) mashed potatoes, steamed carrots (soft)  Afternoon snack: Cottage cheese, canned peaches (in juice, drained)  Dinner: Baked fish (cod or tilapia), white rice, steamed zucchini (peeled and soft)  Evening snack: Smooth peanut butter on white bread (crust removed)    No

## 2025-04-28 NOTE — PLAN OF CARE
Goal Outcome Evaluation:      Plan of Care Reviewed With: patient    Overall Patient Progress: improvingOverall Patient Progress: improving     Assumed care of patient at 2300. Alert and oriented. Up to bathroom with stand by assist. No complaints of nausea. Still having loose stools but less frequent. Has been able to sleep well. Anticipating discharge today.

## 2025-04-28 NOTE — DISCHARGE SUMMARY
"[facility]  Hospitalist Discharge Summary      Date of Admission:  4/25/2025  Date of Discharge:  4/28/2025  Discharging Provider: Edilberto Saxena DO  Discharge Service: Hospitalist Service    Discharge Diagnoses   SBO  Hx of SBO  Enterocolitis due to ETEC/ norovirus   CAITLYN   Hypokalemia  Hepatomegaly  Elevated LFTs  Thrombocytopenia    Bladder cancer s/p TURBT 2/26/25  BPH  HTN  Essential tremor    HLD   Hx of colon cancer s/p colectomy 2000   Chronic back pain s/p spinal cord stimulator implant   Abnormal lesion     Clinically Significant Risk Factors     # Obesity: Estimated body mass index is 32.02 kg/m  as calculated from the following:    Height as of this encounter: 1.676 m (5' 6\").    Weight as of this encounter: 90 kg (198 lb 6.6 oz).       Follow-ups Needed After Discharge   Follow-up Appointments       Hospital Follow-up with Existing Primary Care Provider (PCP)          Schedule Primary Care visit within: 7 Days             Discharge Disposition   Discharged to home  Condition at discharge: Stable    Hospital Course   Peña Hall is a 80 year old male with past medical hx of bladder cancer s/p TURBT, BPH, HTN, HLD admitted on 4/25/2025. He presented for nausea, vomiting, diarrhea for 3 days.   No more nausea/ vomiting. Does have diarrhea-4 stools this AM. Abd pain better.     SBO  Hx of SBO    N/v/d started 4/22 with intermittent crampy abdominal pain. States this is the 3rd time he's had n/v/d in the last 2 months, but didn't seek care for the other times. The other episodes lasted ~2 days.     CT showed findings suggestive of SBO and enterocolitis. Hx of SBO's, last one 10/2023. NGT was unsuccessfully placed in ER. Patient has not had vomiting since 0700 on 4/25, so withheld further placement for now. Was started on LR 150ml/hr. Stool panel positive for norovirus/ ETEC. Pain better - tolerated full liquid diet with progressive improvement of diarrhea. Able to tolerate regular " diet prior to discharging home.   - See discharge instructions for further dietary recommendations to lower the risk of SBO     Enterocolitis due to ETEC/ norovirus  CT with findings of enterocolitis. , procal 4.51. Does not meet SIRS criteria- no leukocytosis, tachycardia, or fever. Patient reported a fever of 102 on 4/24. CRP decreasing - 67 now. PCT improving 1.15. Blood cultures negative.   - Continue supportive care and encouraged hydration     CAITLYN - Resolved   Creatinine 1.43 on admission, baseline 0.7-0.9. Likely due to n/v/d.    Hypokalemia - Resolved      Hepatomegaly  Elevated LFTs - resolved   Thrombocytopenia   Incidental finding on CT.  and  on admission. Plt 146. ? ETOH use vs current infectious etiology. Labs improving so likely secondary to acute infection.      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  - Continued PTA Finasteride 5mg and Tamsulosin 0.8mg     HTN  Stable  - Continue PTA hydrochlorothiazide 12.5mg and Lisinopril 40mg for CAITLYN     Essential tremor  - Continue PTA Propranolol 60mg daily, Primidone 500mg at bedtime      HLD  - Continue PTA Atorvastatin 20mg     Hx of colon cancer s/p colectomy 2000  Noted.     Chronic back pain s/p spinal cord stimulator implant  Implanted 1/2024     Abnormal lesion  Incidental finding on CT as 5 mm hypoattenuating lesion near the falciform ligament suspected to reflect focal fatty infiltration.    - Recommend MRI or CT in 6 months.    Consultations This Hospital Stay   PHARMACY TO [facility] GENERAL IP CONSULT    Code Status   Full Code    Time Spent on this Encounter   I, Edilberto Saxena DO, personally saw the patient today and spent greater than 30 minutes discharging this patient.       Edilberto Saxena DO  [facility] SURGICAL  [address] [address]  Phone: [phone]  Fax:  [phone]  ______________________________________________________________________    Physical Exam   Vital Signs: Temp: 97.5  F (36.4  C) Temp src: Oral BP: 121/67 Pulse: 96   Resp: 18 SpO2: (!) 89 % O2 Device: None (Room air)    Weight: 198 lbs 6.62 oz    Constitutional: awake, alert, cooperative, no apparent distress, and appears stated age  Respiratory: No increased work of breathing, good air exchange, clear to auscultation bilaterally, no crackles or wheezing  Cardiovascular: Normal apical impulse, regular rate and rhythm, normal S1 and S2, no S3 or S4, and no murmur noted  GI: No scars, normal bowel sounds, soft, non-distended, non-tender, no masses palpated, no hepatosplenomegally  Skin: normal skin color, texture, turgor  Musculoskeletal: There is no redness, warmth, or swelling of the joints.  Full range of motion noted.  Motor strength is 5 out of 5 all extremities bilaterally.  Tone is normal.       Primary Care Physician   Sheeba Whitt    Discharge Orders      Reason for your hospital stay    Viral gastroenteritis, SBO     Activity    Your activity upon discharge: activity as tolerated     Diet    Follow this diet upon discharge:   Regular Diet Adult     Hospital Follow-up with Existing Primary Care Provider (PCP)            Significant Results and Procedures   Most Recent 3 CBC's:  Recent Labs   Lab Test 04/27/25  0522 04/26/25  0527 04/25/25  1013   WBC 4.2 4.1 8.2   HGB 13.4 12.8* 13.8   MCV 89 91 91    146* 159     Most Recent 3 BMP's:  Recent Labs   Lab Test 04/28/25  0546 04/27/25  0522 04/26/25  2245 04/26/25  0527 04/25/25  1013   NA  --  139  --  140 137   POTASSIUM 3.5 3.6 3.6 3.0* 3.5   CHLORIDE  --  104  --  103 100   CO2  --  22  --  21* 26   BUN  --  8.4  --  15.9 29.6*   CR  --  0.79  --  0.88 1.43*   ANIONGAP  --  13  --  16* 11   SARAVANAN  --  8.7*  --  8.7* 8.5*   GLC  --  91  --  74 100*   ,   Results for orders placed or performed during the hospital encounter of 04/25/25    CT Abdomen Pelvis w Contrast    Narrative    EXAM: CT ABDOMEN PELVIS W CONTRAST  LOCATION: [facility]  DATE: 4/25/2025    INDICATION: hx of bowel obstruction, bladder cancer  4 day hx of abdominal pain, nausea, vomiting, diarrhea, fevers  COMPARISON: CT abdomen and pelvis performed on 1/30/2025  TECHNIQUE: CT scan of the abdomen and pelvis was performed following injection of IV contrast. Multiplanar reformats were obtained. Dose reduction techniques were used.  CONTRAST: 96 mL Isovue 370    FINDINGS:   LOWER CHEST: Multivessel coronary artery calcifications are present. Partially seen vascular calcifications in the thoracic aorta. Small hiatal hernia is present. Subsegmental atelectasis is seen within the lower lungs. Stable pulmonary nodules measuring   up to 5 mm within the right middle lobe (series 2, image 10). Calcified granulomas are present bilaterally.    HEPATOBILIARY: Liver is enlarged measuring 19 cm in craniocaudal length. 5 mm hypoattenuating focus near the falciform ligament was less conspicuous on the prior exam, possibly due to phase of contrast on the prior study (series 2, image 31). Gallbladder   is surgically absent.    PANCREAS: No significant mass, duct dilatation, or inflammatory change.    SPLEEN: Multiple calcified granulomas are seen in the spleen.    ADRENAL GLANDS: Stable mild nodular thickening within the adrenal glands.    KIDNEYS/BLADDER: No hydronephrosis is seen. Stable exophytic cyst along the superior pole of the left kidney. Other subcentimeter hypodense lesions are too small to characterize but statistically likely reflect small cysts and appear unchanged.    BOWEL: Mild wall thickening is seen involving the rectum, sigmoid and descending colon. Multiple dilated loops of small bowel are seen measuring up to 5.1 cm along the left anterior abdomen. Relatively gradual transition to nondilated bowel is present in   the in the left lower abdomen (series  2, image 57). Mild wall thickening is also noted along several loops of small bowel distal to the areas of dilation. Mild wall thickening is present along the descending and sigmoid colon.    LYMPH NODES: No lymphadenopathy.    VASCULATURE: Moderate to severe vascular calcifications are seen in the abdominal aorta and iliac branches.    PELVIC ORGANS: Obscured due to streak artifact from bilateral hip arthroplasty. Small fat-containing inguinal hernias are present, right greater than left.    MUSCULOSKELETAL: Small fat-containing umbilical hernia is present. Multilevel degenerative changes are present in the spine. Bilateral hip arthroplasties are seen. Partially seen thoracic spinal stimulator leads.      Impression    IMPRESSION:   1.  Multiple dilated loops of small bowel are present measuring up to 5.1 cm in diameter. Relatively gradual transition to nondilated bowel is seen in the left lower abdomen. Findings are suggestive of small bowel obstruction.  2.  Mild wall thickening is seen along several loops of small bowel distal to the areas of dilation as well as along the descending and sigmoid colon. Findings are suggestive of enterocolitis. Suggest clinical correlation.  3.  Hepatomegaly.  4.  Stable pulmonary nodules measuring up to 5 mm in the right middle lobe.  5.  5 mm hypoattenuating lesion near the falciform ligament was not definitively seen previously and suspected to reflect focal fatty infiltration given its location. Follow-up characterization could be considered with MRI exam per guidelines below.    REFERENCE:  Guidelines for Management of Incidental Pulmonary Nodules Detected on CT Images: From the Fleischner Society 2017.   Guidelines apply to incidental nodules in patients who are 35 years or older.  Guidelines do not apply to lung cancer screening, patients with immunosuppression, or patients with known primary cancer.    MULTIPLE NODULES  Nodule size <6 mm  Low-risk patients: No follow-up  needed.  High-risk patients: Optional follow-up at 12 months.    Nodule size 6 mm or larger  Low-risk patients: Follow-up CT at 3-6 months, then consider CT at 18-24 months.  High-risk patients: Follow-up CT at 3-6 months, then at 18-24 months if no change.  -Use most suspicious nodule as guide to management.    Small (0.5 - 1.5 cm) low attenuation liver lesion with suspicious imaging features. 6 month follow-up imaging with MRI/CT recommended to assess changes in size and morphology.    Adapted from Consensus Recommendations, J Am Simon Radiol 2010;7:754-773.         Discharge Medications   Current Discharge Medication List        CONTINUE these medications which have NOT CHANGED    Details   acetaminophen (TYLENOL) 500 MG tablet Take 500-1,000 mg by mouth every 6 hours as needed for mild pain.      albuterol (PROAIR HFA/PROVENTIL HFA/VENTOLIN HFA) 108 (90 Base) MCG/ACT inhaler Inhale 2 puff using inhaler every four hours as needed for wheezing. pretreat before activity  Qty: 18 g, Refills: 1    Comments: Pharmacy may dispense brand covered by insurance (Proair, or proventil or ventolin or generic albuterol inhaler)  Associated Diagnoses: Mild intermittent asthma without complication      atorvastatin (LIPITOR) 20 MG tablet Take 1 tablet (20 mg) by mouth every evening.  Qty: 90 tablet, Refills: 3    Associated Diagnoses: Hyperlipidemia LDL goal <130      cholecalciferol (VITAMIN D3) 125 mcg (5000 units) capsule Take 125 mcg by mouth daily      cholestyramine (QUESTRAN) 4 g packet Take 2 packets by mouth daily.      diphenhydrAMINE-acetaminophen (TYLENOL PM)  MG tablet Take 2 tablets by mouth nightly as needed for sleep      finasteride (PROSCAR) 5 MG tablet Take 1 tablet (5 mg) by mouth daily.  Qty: 90 tablet, Refills: 3    Associated Diagnoses: Benign prostatic hyperplasia with urinary hesitancy      hydroCHLOROthiazide 12.5 MG tablet TAKE ONE TABLET BY MOUTH ONCE DAILY  Qty: 90 tablet, Refills: 0     Comments: Needs BP recheck for further refill  Associated Diagnoses: Hypertension, unspecified type      lisinopril (ZESTRIL) 40 MG tablet Take 1 tablet (40 mg) by mouth daily.  Qty: 90 tablet, Refills: 3    Associated Diagnoses: Hypertension, unspecified type      meloxicam (MOBIC) 7.5 MG tablet Take 1.5 tablets (11.25 mg) by mouth daily  Qty: 135 tablet, Refills: 3    Comments: File for when needs refill. Dose adjustment  Associated Diagnoses: DDD (degenerative disc disease), lumbar; Lumbar facet arthropathy; Lumbar spondylosis      primidone (MYSOLINE) 250 MG tablet Take 2 tablets (500 mg) by mouth every evening.  Qty: 180 tablet, Refills: 3    Associated Diagnoses: Tremor      propranolol ER (INDERAL LA) 60 MG 24 hr capsule Take 1 capsule (60 mg) by mouth daily  Qty: 90 capsule, Refills: 3    Associated Diagnoses: Hypertension, unspecified type; Tremor      tamsulosin (FLOMAX) 0.4 MG capsule Take 2 capsules (0.8 mg) by mouth daily.  Qty: 180 capsule, Refills: 3    Associated Diagnoses: Benign prostatic hyperplasia with urinary hesitancy           Allergies   No Known Allergies

## 2025-04-28 NOTE — PROGRESS NOTES
PATRICIA MARROQUING DISCHARGE NOTE    Patient discharged to home at 2:23PM via wheel chair. Accompanied by staff. Discharge instructions reviewed with patient, opportunity offered to ask questions. Prescriptions - None ordered for discharge. All belongings sent with patient.    Hanane Rhodes RN

## 2025-04-29 ENCOUNTER — PATIENT OUTREACH (OUTPATIENT)
Dept: CARE COORDINATION | Facility: CLINIC | Age: 80
End: 2025-04-29
Payer: COMMERCIAL

## 2025-04-29 NOTE — PROGRESS NOTES
"Clinic Care Coordination Contact  Transitions of Care Outreach  Chief Complaint   Patient presents with    Clinic Care Coordination - Post Hospital       Most Recent Admission Date: 4/25/2025   Most Recent Admission Diagnosis: Small bowel obstruction (H) - K56.609  Abdominal pain - R10.9  Elevated serum creatinine - R79.89  Nausea vomiting and diarrhea - R11.2, R19.7     Most Recent Discharge Date: 4/28/2025   Most Recent Discharge Diagnosis: Abdominal pain - R10.9  Elevated serum creatinine - R79.89  Small bowel obstruction (H) - K56.609  Nausea vomiting and diarrhea - R11.2, R19.7     Transitions of Care Assessment    Discharge Assessment  How are you doing now that you are home?: \"Oh okay. Okay expect for my legs are weak.\"  How are your symptoms? (Red Flag symptoms escalate to triage hotline per guidelines): Improved, Unchanged  Do you know how to contact your clinic care team if you have future questions or changes to your health status? : Yes  Does the patient have their discharge instructions? : Yes  Does the patient have questions regarding their discharge instructions? : No  Were you started on any new medications or were there changes to any of your previous medications? : No  Does the patient have all of their medications?: Yes  Do you have questions regarding any of your medications? : No  Do you have all of your needed medical supplies or equipment (DME)?  (i.e. oxygen tank, CPAP, cane, etc.): Yes    Post-op ([facility] Only)  If the patient had a surgery or procedure, do they have any questions for a nurse?: No         [facility] Explained and offered Care Coordination support to eligible patients: Yes    Patient accepted? No    Follow up Plan     Discharge Follow-Up  Discharge follow up appointment scheduled in alignment with recommended follow up timeframe or Transitions of Risk Category? (Low = within 30 days; Moderate= within 14 days; High= within 7 days): Yes  Discharge Follow Up Appointment Date: " 05/02/25  Discharge Follow Up Appointment Scheduled with?: Primary Care Provider    Future Appointments   Date Time Provider Department Center   5/2/2025 10:00 AM Sheeba Whitt MD [facility]   6/17/2025 10:30 AM Kenneth Webster MD [facility]       Outpatient Plan as outlined on AVS reviewed with patient.    For any urgent concerns, please contact our 24 hour nurse triage line: [phone] ([phone])       MILADIS Anders  [phone]  Connected Care Resource [facility]

## 2025-04-30 LAB
BACTERIA BLD CULT: NO GROWTH
BACTERIA BLD CULT: NO GROWTH

## 2025-05-02 PROBLEM — N17.9 AKI (ACUTE KIDNEY INJURY): Status: RESOLVED | Noted: 2025-04-25 | Resolved: 2025-05-02

## 2025-05-05 ENCOUNTER — PATIENT OUTREACH (OUTPATIENT)
Dept: CARE COORDINATION | Facility: CLINIC | Age: 80
End: 2025-05-05
Payer: COMMERCIAL

## 2025-05-05 LAB
ADV 40+41 DNA STL QL NAA+NON-PROBE: NEGATIVE
ASTRO TYP 1-8 RNA STL QL NAA+NON-PROBE: NEGATIVE
C CAYETANENSIS DNA STL QL NAA+NON-PROBE: NEGATIVE
CAMPYLOBACTER DNA SPEC NAA+PROBE: NEGATIVE
CRYPTOSP DNA STL QL NAA+NON-PROBE: NEGATIVE
E COLI O157 DNA STL QL NAA+NON-PROBE: ABNORMAL
E HISTOLYT DNA STL QL NAA+NON-PROBE: NEGATIVE
EAEC ASTA GENE ISLT QL NAA+PROBE: NEGATIVE
EC STX1+STX2 GENES STL QL NAA+NON-PROBE: NEGATIVE
EPEC EAE GENE STL QL NAA+NON-PROBE: NEGATIVE
ETEC LTA+ST1A+ST1B TOX ST NAA+NON-PROBE: POSITIVE
G LAMBLIA DNA STL QL NAA+NON-PROBE: NEGATIVE
NOROVIRUS GI+II RNA STL QL NAA+NON-PROBE: POSITIVE
P SHIGELLOIDES DNA STL QL NAA+NON-PROBE: NEGATIVE
RVA RNA STL QL NAA+NON-PROBE: NEGATIVE
SALMONELLA SP RPOD STL QL NAA+PROBE: NEGATIVE
SAPO I+II+IV+V RNA STL QL NAA+NON-PROBE: NEGATIVE
SHIGELLA SP+EIEC IPAH ST NAA+NON-PROBE: NEGATIVE
V CHOLERAE DNA SPEC QL NAA+PROBE: NEGATIVE
VIBRIO DNA SPEC NAA+PROBE: NEGATIVE
Y ENTEROCOL DNA STL QL NAA+PROBE: NEGATIVE

## 2025-05-07 ENCOUNTER — PATIENT OUTREACH (OUTPATIENT)
Dept: CARE COORDINATION | Facility: CLINIC | Age: 80
End: 2025-05-07
Payer: COMMERCIAL

## 2025-05-12 ENCOUNTER — OFFICE VISIT (OUTPATIENT)
Dept: SURGERY | Facility: CLINIC | Age: 80
End: 2025-05-12
Attending: STUDENT IN AN ORGANIZED HEALTH CARE EDUCATION/TRAINING PROGRAM
Payer: COMMERCIAL

## 2025-05-12 VITALS
HEART RATE: 60 BPM | OXYGEN SATURATION: 93 % | WEIGHT: 199 LBS | HEIGHT: 66 IN | BODY MASS INDEX: 31.98 KG/M2 | DIASTOLIC BLOOD PRESSURE: 72 MMHG | SYSTOLIC BLOOD PRESSURE: 151 MMHG

## 2025-05-12 DIAGNOSIS — Z90.49 S/P APPENDECTOMY: ICD-10-CM

## 2025-05-12 DIAGNOSIS — K56.609 SMALL BOWEL OBSTRUCTION (H): Primary | ICD-10-CM

## 2025-05-12 DIAGNOSIS — Z90.49 S/P CHOLECYSTECTOMY: ICD-10-CM

## 2025-05-12 DIAGNOSIS — Z85.038 HISTORY OF COLON CANCER: ICD-10-CM

## 2025-05-12 PROCEDURE — 3078F DIAST BP <80 MM HG: CPT | Performed by: STUDENT IN AN ORGANIZED HEALTH CARE EDUCATION/TRAINING PROGRAM

## 2025-05-12 PROCEDURE — 99203 OFFICE O/P NEW LOW 30 MIN: CPT | Performed by: STUDENT IN AN ORGANIZED HEALTH CARE EDUCATION/TRAINING PROGRAM

## 2025-05-12 PROCEDURE — 3077F SYST BP >= 140 MM HG: CPT | Performed by: STUDENT IN AN ORGANIZED HEALTH CARE EDUCATION/TRAINING PROGRAM

## 2025-05-12 NOTE — PATIENT INSTRUCTIONS
1. Due to anticipated complexity and elevated risk, recommend against elective lysis of adhesions  2. If patient and family wish to proceed with this procedure, I recommend this be performed at tertiary care facility due to expected complexity of procedure  3. Follow up in surgery clinic if any further questions or concerns

## 2025-05-12 NOTE — PROGRESS NOTES
Surgical Consultation/History and Physical  [facility] General Surgery    Peña is seen in consultation for intermittent bowel obstructions, at the request of Sheeba Whitt MD.    Chief Complaint:  intermittent bowel obstructions    History of Present Illness: Peña Hall is a 80 year old male presents for evaluation after he was hospitalized for a small bowel obstruction.  He has a complex surgical history including partial colectomy for left colon cancer, open appendectomy, cholecystectomy.  He has been hospitalized multiple times for partial small bowel obstructions which have resolved without the need for surgery.  He currently feels well without any abdominal pain and has been having normal bowel movements.  However, he is worried that he may have another episode of a bowel obstruction and he was advised by his primary care provider to discuss this with general surgery team.     Patient Active Problem List   Diagnosis    Chronic diarrhea    Chronic midline low back pain without sciatica    History of colon cancer    HTN (hypertension)    Hyperlipidemia LDL goal <130    BPH (benign prostatic hyperplasia)    Abnormal CT lung screening    Mild intermittent asthma without complication    Tremor    DDD (degenerative disc disease), lumbar    Lumbar facet arthropathy    Lumbar spondylolysis    Chronic pain of right knee    Balance problem    Malignant neoplasm of overlapping sites of bladder (H)    Small bowel obstruction (H)       Past Medical History:   Diagnosis Date    Arthritis     Asthma     Balance problems     Bladder tumor     BPH (benign prostatic hyperplasia)     Chronic diarrhea 10/22/2023    Chronic midline low back pain without sciatica 10/22/2023    Chronic pain of right knee     Colon cancer (H) 2000    COPD (chronic obstructive pulmonary disease) (H)     DDD (degenerative disc disease), lumbar     HTN (hypertension)     Hyperlipidemia LDL goal <130     Lumbar facet arthropathy     Lumbar  spondylolysis     Tremor        Past Surgical History:   Procedure Laterality Date    ABDOMEN SURGERY      APPENDECTOMY      BOWEL RESECTION      CHOLECYSTECTOMY      COLONOSCOPY N/A 2023    Procedure: COLONOSCOPY, WITH POLYPECTOMY AND BIOPSY;  Surgeon: Marli Lyons MD;  Location: [facility]    COLONOSCOPY N/A 2023    Procedure: COLONOSCOPY, FLEXIBLE, WITH LESION REMOVAL USING SNARE;  Surgeon: Marli Lyons MD;  Location: [facility]    CYSTOSCOPY, TRANSURETHRAL RESECTION (TUR) TUMOR BLADDER, COMBINED N/A 2025    Procedure: CYSTOSCOPY, WITH TRANSURETHRAL RESECTION BLADDER TUMOR;  Surgeon: Bryan lAlen MD;  [facility]: [facility] OR    EYE SURGERY  2018    ORTHOPEDIC SURGERY  2012       Family History   Problem Relation Age of Onset    Diabetes Sister     Colon Cancer Other         Aunt       Social History     Tobacco Use    Smoking status: Former     Current packs/day: 0.00     Average packs/day: 1 pack/day for 48.0 years (48.0 ttl pk-yrs)     Types: Cigarettes     Start date: 1970     Quit date: 2016     Years since quittin.1    Smokeless tobacco: Never   Substance Use Topics    Alcohol use: Yes     Comment: rare        History   Drug Use Unknown       Current Outpatient Medications   Medication Sig Dispense Refill    acetaminophen (TYLENOL) 500 MG tablet Take 500-1,000 mg by mouth every 6 hours as needed for mild pain.      albuterol (PROAIR HFA/PROVENTIL HFA/VENTOLIN HFA) 108 (90 Base) MCG/ACT inhaler Inhale 2 puff using inhaler every four hours as needed for wheezing. pretreat before activity 18 g 1    atorvastatin (LIPITOR) 20 MG tablet Take 1 tablet (20 mg) by mouth every evening. 90 tablet 3    cholecalciferol (VITAMIN D3) 125 mcg (5000 units) capsule Take 125 mcg by mouth daily      cholestyramine (QUESTRAN) 4 g packet Take 2 packets by mouth daily.      diphenhydrAMINE-acetaminophen (TYLENOL PM)  MG tablet Take 2 tablets by mouth nightly  "as needed for sleep      finasteride (PROSCAR) 5 MG tablet Take 1 tablet (5 mg) by mouth daily. 90 tablet 3    hydroCHLOROthiazide 12.5 MG tablet TAKE ONE TABLET BY MOUTH ONCE DAILY 90 tablet 0    lisinopril (ZESTRIL) 40 MG tablet Take 1 tablet (40 mg) by mouth daily. 90 tablet 3    meloxicam (MOBIC) 7.5 MG tablet Take 1.5 tablets (11.25 mg) by mouth daily 135 tablet 3    primidone (MYSOLINE) 250 MG tablet Take 2 tablets (500 mg) by mouth every evening. 180 tablet 3    propranolol ER (INDERAL LA) 60 MG 24 hr capsule Take 1 capsule (60 mg) by mouth daily 90 capsule 3    tamsulosin (FLOMAX) 0.4 MG capsule Take 2 capsules (0.8 mg) by mouth daily. 180 capsule 3       No Known Allergies    Review of Systems:   10 point ROS negative other than what was listed in HPI    Physical Exam:  BP (!) 151/72   Pulse 60   Ht 1.676 m (5' 6\")   Wt 90.3 kg (199 lb)   SpO2 93%   BMI 32.12 kg/m      Constitutional- No acute distress, well nourished, non-toxic  Eyes: Anicteric, no injection.  PERRL  ENT:  Normocephalic, atraumatic, Nose midline, moist mucus membranes  Neck - supple, no LAD, Thyroid smooth, symmetric, Carotids without bruits  Respiratory- Nonlabored breathing on room air  Cardiovascular - Extremities warm and well perfused.  Abdomen - Soft, non-tender, nondistended, multiple surgical scars, including well-healed midline laparotomy incision and vertical incision in right lower quadrant from open appendectomy  Neuro - No focal neuro deficits, Alert and oriented x 3  Psych: Appropriate mood and affect  Musculoskeletal: Normal gait, symmetric strength.  FROM upper and lower extremities.  Skin: Warm, Dry    Assessment:  1. Peña Hall is a 80 year old male with complex surgical history and history of multiple small bowel obstructions who presents to surgery clinic to discuss elective lysis of adhesions to reduce risk of further small bowel obstructions.  At this time, he is nontoxic-appearing without any abdominal pain, " tolerating regular diet.    I had a lengthy discussion with both patient and his daughter regarding pathogenesis of adhesive small bowel obstructions.  I explained that intra-abdominal adhesions can form from prior abdominal surgeries.  In general, the amount of adhesions correlates with the number and invasiveness of prior surgeries.  I explained that literature is conflicting on the role of preventative lysis of adhesions, specifically because further abdominal surgeries is expected to generate more intra-abdominal adhesions.  I also explained that intra-abdominal adhesions can obscure visualization of anatomy, which puts patients at elevated risk of iatrogenic injury to abdominal contents such as small or large intestine.  I explained that, if patient and family are open to proceeding with elective lysis of adhesions, I would recommend this be performed at a tertiary care facility such as the [facility] due to the anticipated complexity of the procedure.  Patient and his daughter had the opportunity to ask questions which were answered to the best of my ability.  He states that he would like to avoid further surgery if possible, and he is thankful for the information presented.  He understands symptoms to watch for in the event that a repeat bowel obstruction occurs and understands the need to seek care in the emergency department if this occurs.  Patient invited to return to my surgery clinic if they have any further questions or concerns.    Plan:   1. Due to anticipated complexity and elevated risk, recommend against elective lysis of adhesions  2. If patient and family wish to proceed with this procedure, I recommend this be performed at tertiary care facility due to expected complexity of procedure  3. Follow up in surgery clinic if any further questions or concerns        Braydon Rios MD on 5/12/2025 at 2:21 PM

## 2025-05-12 NOTE — NURSING NOTE
"Initial BP (!) 151/72   Pulse 60   Ht 1.676 m (5' 6\")   Wt 90.3 kg (199 lb)   SpO2 93%   BMI 32.12 kg/m   Estimated body mass index is 32.12 kg/m  as calculated from the following:    Height as of this encounter: 1.676 m (5' 6\").    Weight as of this encounter: 90.3 kg (199 lb). .  Chana Cartagena MA    "

## 2025-05-12 NOTE — LETTER
5/12/2025      Peña Hall  [address] [place] [id number]      Dear Colleague,    Thank you for referring your patient, Peña Hall, to the [facility]. Please see a copy of my visit note below.    Surgical Consultation/History and Physical  [facility] Surgery    Peña is seen in consultation for intermittent bowel obstructions, at the request of Sheeba Whitt MD.    Chief Complaint:  intermittent bowel obstructions    History of Present Illness: Peña Hall is a 80 year old male presents for evaluation after he was hospitalized for a small bowel obstruction.  He has a complex surgical history including partial colectomy for left colon cancer, open appendectomy, cholecystectomy.  He has been hospitalized multiple times for partial small bowel obstructions which have resolved without the need for surgery.  He currently feels well without any abdominal pain and has been having normal bowel movements.  However, he is worried that he may have another episode of a bowel obstruction and he was advised by his primary care provider to discuss this with general surgery team.     Patient Active Problem List   Diagnosis     Chronic diarrhea     Chronic midline low back pain without sciatica     History of colon cancer     HTN (hypertension)     Hyperlipidemia LDL goal <130     BPH (benign prostatic hyperplasia)     Abnormal CT lung screening     Mild intermittent asthma without complication     Tremor     DDD (degenerative disc disease), lumbar     Lumbar facet arthropathy     Lumbar spondylolysis     Chronic pain of right knee     Balance problem     Malignant neoplasm of overlapping sites of bladder (H)     Small bowel obstruction (H)       Past Medical History:   Diagnosis Date     Arthritis      Asthma      Balance problems      Bladder tumor      BPH (benign prostatic hyperplasia)      Chronic diarrhea 10/22/2023     Chronic midline low back pain without sciatica  10/22/2023     Chronic pain of right knee      Colon cancer (H)      COPD (chronic obstructive pulmonary disease) (H)      DDD (degenerative disc disease), lumbar      HTN (hypertension)      Hyperlipidemia LDL goal <130      Lumbar facet arthropathy      Lumbar spondylolysis      Tremor        Past Surgical History:   Procedure Laterality Date     ABDOMEN SURGERY       APPENDECTOMY       BOWEL RESECTION       CHOLECYSTECTOMY       COLONOSCOPY N/A 2023    Procedure: COLONOSCOPY, WITH POLYPECTOMY AND BIOPSY;  Surgeon: Marli Lyons MD;  Location: [facility]     COLONOSCOPY N/A 2023    Procedure: COLONOSCOPY, FLEXIBLE, WITH LESION REMOVAL USING SNARE;  Surgeon: Marli Lyons MD;  Location: [facility]     CYSTOSCOPY, TRANSURETHRAL RESECTION (TUR) TUMOR BLADDER, COMBINED N/A 2025    Procedure: CYSTOSCOPY, WITH TRANSURETHRAL RESECTION BLADDER TUMOR;  Surgeon: Bryan Allen MD;  Location: [facility] OR     EYE SURGERY       ORTHOPEDIC SURGERY  2012       Family History   Problem Relation Age of Onset     Diabetes Sister      Colon Cancer Other         Aunt       Social History     Tobacco Use     Smoking status: Former     Current packs/day: 0.00     Average packs/day: 1 pack/day for 48.0 years (48.0 ttl pk-yrs)     Types: Cigarettes     Start date: 1970     Quit date: 2016     Years since quittin.1     Smokeless tobacco: Never   Substance Use Topics     Alcohol use: Yes     Comment: rare        History   Drug Use Unknown       Current Outpatient Medications   Medication Sig Dispense Refill     acetaminophen (TYLENOL) 500 MG tablet Take 500-1,000 mg by mouth every 6 hours as needed for mild pain.       albuterol (PROAIR HFA/PROVENTIL HFA/VENTOLIN HFA) 108 (90 Base) MCG/ACT inhaler Inhale 2 puff using inhaler every four hours as needed for wheezing. pretreat before activity 18 g 1     atorvastatin (LIPITOR) 20 MG tablet Take 1 tablet (20 mg) by mouth  "every evening. 90 tablet 3     cholecalciferol (VITAMIN D3) 125 mcg (5000 units) capsule Take 125 mcg by mouth daily       cholestyramine (QUESTRAN) 4 g packet Take 2 packets by mouth daily.       diphenhydrAMINE-acetaminophen (TYLENOL PM)  MG tablet Take 2 tablets by mouth nightly as needed for sleep       finasteride (PROSCAR) 5 MG tablet Take 1 tablet (5 mg) by mouth daily. 90 tablet 3     hydroCHLOROthiazide 12.5 MG tablet TAKE ONE TABLET BY MOUTH ONCE DAILY 90 tablet 0     lisinopril (ZESTRIL) 40 MG tablet Take 1 tablet (40 mg) by mouth daily. 90 tablet 3     meloxicam (MOBIC) 7.5 MG tablet Take 1.5 tablets (11.25 mg) by mouth daily 135 tablet 3     primidone (MYSOLINE) 250 MG tablet Take 2 tablets (500 mg) by mouth every evening. 180 tablet 3     propranolol ER (INDERAL LA) 60 MG 24 hr capsule Take 1 capsule (60 mg) by mouth daily 90 capsule 3     tamsulosin (FLOMAX) 0.4 MG capsule Take 2 capsules (0.8 mg) by mouth daily. 180 capsule 3       No Known Allergies    Review of Systems:   10 point ROS negative other than what was listed in HPI    Physical Exam:  BP (!) 151/72   Pulse 60   Ht 1.676 m (5' 6\")   Wt 90.3 kg (199 lb)   SpO2 93%   BMI 32.12 kg/m      Constitutional- No acute distress, well nourished, non-toxic  Eyes: Anicteric, no injection.  PERRL  ENT:  Normocephalic, atraumatic, Nose midline, moist mucus membranes  Neck - supple, no LAD, Thyroid smooth, symmetric, Carotids without bruits  Respiratory- Nonlabored breathing on room air  Cardiovascular - Extremities warm and well perfused.  Abdomen - Soft, non-tender, nondistended, multiple surgical scars, including well-healed midline laparotomy incision and vertical incision in right lower quadrant from open appendectomy  Neuro - No focal neuro deficits, Alert and oriented x 3  Psych: Appropriate mood and affect  Musculoskeletal: Normal gait, symmetric strength.  FROM upper and lower extremities.  Skin: Warm, Dry    Assessment:  1. Peña FINNEY" Rafael is a 80 year old male with complex surgical history and history of multiple small bowel obstructions who presents to surgery clinic to discuss elective lysis of adhesions to reduce risk of further small bowel obstructions.  At this time, he is nontoxic-appearing without any abdominal pain, tolerating regular diet.    I had a lengthy discussion with both patient and his daughter regarding pathogenesis of adhesive small bowel obstructions.  I explained that intra-abdominal adhesions can form from prior abdominal surgeries.  In general, the amount of adhesions correlates with the number and invasiveness of prior surgeries.  I explained that literature is conflicting on the role of preventative lysis of adhesions, specifically because further abdominal surgeries is expected to generate more intra-abdominal adhesions.  I also explained that intra-abdominal adhesions can obscure visualization of anatomy, which puts patients at elevated risk of iatrogenic injury to abdominal contents such as small or large intestine.  I explained that, if patient and family are open to proceeding with elective lysis of adhesions, I would recommend this be performed at a tertiary care facility such as the [facility] due to the anticipated complexity of the procedure.  Patient and his daughter had the opportunity to ask questions which were answered to the best of my ability.  He states that he would like to avoid further surgery if possible, and he is thankful for the information presented.  He understands symptoms to watch for in the event that a repeat bowel obstruction occurs and understands the need to seek care in the emergency department if this occurs.  Patient invited to return to my surgery clinic if they have any further questions or concerns.    Plan:   1. Due to anticipated complexity and elevated risk, recommend against elective lysis of adhesions  2. If patient and family wish to proceed with this procedure,  I recommend this be performed at tertiary care facility due to expected complexity of procedure  3. Follow up in surgery clinic if any further questions or concerns        Braydon Rios MD on 5/12/2025 at 2:21 PM      Again, thank you for allowing me to participate in the care of your patient.        Sincerely,        Braydon Rios MD    Electronically signed

## 2025-06-04 ENCOUNTER — TRANSFERRED RECORDS (OUTPATIENT)
Dept: HEALTH INFORMATION MANAGEMENT | Facility: CLINIC | Age: 80
End: 2025-06-04
Payer: COMMERCIAL

## 2025-06-09 DIAGNOSIS — M47.816 LUMBAR FACET ARTHROPATHY: ICD-10-CM

## 2025-06-09 DIAGNOSIS — I10 HYPERTENSION, UNSPECIFIED TYPE: ICD-10-CM

## 2025-06-09 DIAGNOSIS — M47.816 LUMBAR SPONDYLOSIS: ICD-10-CM

## 2025-06-09 DIAGNOSIS — M51.369 DDD (DEGENERATIVE DISC DISEASE), LUMBAR: ICD-10-CM

## 2025-06-09 DIAGNOSIS — R25.1 TREMOR: ICD-10-CM

## 2025-06-09 RX ORDER — PROPRANOLOL HYDROCHLORIDE 60 MG/1
60 CAPSULE, EXTENDED RELEASE ORAL DAILY
Qty: 90 CAPSULE | Refills: 1 | Status: SHIPPED | OUTPATIENT
Start: 2025-06-09

## 2025-06-09 RX ORDER — MELOXICAM 7.5 MG/1
11.25 TABLET ORAL DAILY
Qty: 135 TABLET | Refills: 1 | Status: SHIPPED | OUTPATIENT
Start: 2025-06-09

## 2025-06-17 ENCOUNTER — OFFICE VISIT (OUTPATIENT)
Dept: UROLOGY | Facility: CLINIC | Age: 80
End: 2025-06-17
Payer: COMMERCIAL

## 2025-06-17 VITALS
HEIGHT: 66 IN | OXYGEN SATURATION: 96 % | SYSTOLIC BLOOD PRESSURE: 211 MMHG | DIASTOLIC BLOOD PRESSURE: 104 MMHG | BODY MASS INDEX: 31.98 KG/M2 | HEART RATE: 55 BPM | WEIGHT: 199 LBS

## 2025-06-17 DIAGNOSIS — C67.8 MALIGNANT NEOPLASM OF OVERLAPPING SITES OF BLADDER (H): Primary | ICD-10-CM

## 2025-06-17 DIAGNOSIS — R31.0 GROSS HEMATURIA: ICD-10-CM

## 2025-06-17 PROCEDURE — 3080F DIAST BP >= 90 MM HG: CPT | Performed by: STUDENT IN AN ORGANIZED HEALTH CARE EDUCATION/TRAINING PROGRAM

## 2025-06-17 PROCEDURE — 99207 PR DROP WITH A PROCEDURE: CPT | Mod: 25 | Performed by: STUDENT IN AN ORGANIZED HEALTH CARE EDUCATION/TRAINING PROGRAM

## 2025-06-17 PROCEDURE — 1126F AMNT PAIN NOTED NONE PRSNT: CPT | Performed by: STUDENT IN AN ORGANIZED HEALTH CARE EDUCATION/TRAINING PROGRAM

## 2025-06-17 PROCEDURE — 88112 CYTOPATH CELL ENHANCE TECH: CPT | Performed by: PATHOLOGY

## 2025-06-17 PROCEDURE — 52000 CYSTOURETHROSCOPY: CPT | Performed by: STUDENT IN AN ORGANIZED HEALTH CARE EDUCATION/TRAINING PROGRAM

## 2025-06-17 PROCEDURE — 3077F SYST BP >= 140 MM HG: CPT | Performed by: STUDENT IN AN ORGANIZED HEALTH CARE EDUCATION/TRAINING PROGRAM

## 2025-06-17 ASSESSMENT — PAIN SCALES - GENERAL: PAINLEVEL_OUTOF10: NO PAIN (0)

## 2025-06-17 NOTE — PROGRESS NOTES
Reason for cystoscopy: bladder cancer    Brief History:   79 year old male with a history of HTN and HLD     2/26/2025 TURBT with Dr Allen 2  cm tumor located on the  right lateral wall of the bladder  TaLg with muscle present. Recommended schedule should be at 3 months, 12 months and yearly thereafter      CYSTOSCOPY  We discussed the risks and benefits of the procedure which include risk of bleeding and infection.  Informed consent was obtained, the patient was prepped and draped in the standard sterile fashion.  A flexible cystoscope was introduced through a well-lubricated urethra.     Anterior urethra strictures were absent.   The urinary sphincter was intact.  The prostate demonstrated mild hypertrophy.  Bladder neck was open.   Bladder signififcant for the following:      Diverticuli: No      Cellules: No      Trabeculation: none     Evidence of prior TURBT on the right lateral wall with some old clot that was attached that I washed off and removed but no evidence of recurrence    The ureteral orifices  were identified on each side in orthotopic position  On retroflexion there was the usual bladder neck hyperemia.  There no intravesical protrusion of the prostate.      The flexible cystoscope was removed and the findings were described to the patient.     Assessment and Plan  [facility] 2/2025  Normal cystoscopy  Sent cytology  Plan for cysto in 12 months next

## 2025-06-19 ENCOUNTER — RESULTS FOLLOW-UP (OUTPATIENT)
Dept: SURGERY | Facility: CLINIC | Age: 80
End: 2025-06-19

## 2025-06-19 LAB
PATH REPORT.COMMENTS IMP SPEC: NORMAL
PATH REPORT.FINAL DX SPEC: NORMAL
PATH REPORT.GROSS SPEC: NORMAL
PATH REPORT.MICROSCOPIC SPEC OTHER STN: NORMAL

## 2025-08-04 DIAGNOSIS — I10 HYPERTENSION, UNSPECIFIED TYPE: ICD-10-CM

## 2025-08-04 RX ORDER — HYDROCHLOROTHIAZIDE 12.5 MG/1
TABLET ORAL
Qty: 7 TABLET | Refills: 0 | Status: SHIPPED | OUTPATIENT
Start: 2025-08-04 | End: 2025-08-06

## 2025-08-06 ENCOUNTER — ALLIED HEALTH/NURSE VISIT (OUTPATIENT)
Dept: FAMILY MEDICINE | Facility: CLINIC | Age: 80
End: 2025-08-06
Payer: COMMERCIAL

## 2025-08-06 VITALS — HEART RATE: 71 BPM | DIASTOLIC BLOOD PRESSURE: 78 MMHG | SYSTOLIC BLOOD PRESSURE: 129 MMHG

## 2025-08-06 DIAGNOSIS — Z01.30 BP CHECK: Primary | ICD-10-CM

## 2025-08-06 DIAGNOSIS — I10 HYPERTENSION, UNSPECIFIED TYPE: ICD-10-CM

## 2025-08-06 PROCEDURE — 99207 PR NO CHARGE NURSE ONLY: CPT | Performed by: STUDENT IN AN ORGANIZED HEALTH CARE EDUCATION/TRAINING PROGRAM

## 2025-08-06 RX ORDER — HYDROCHLOROTHIAZIDE 12.5 MG/1
TABLET ORAL
Qty: 90 TABLET | Refills: 0 | Status: SHIPPED | OUTPATIENT
Start: 2025-08-06

## 2025-08-13 ENCOUNTER — MYC MEDICAL ADVICE (OUTPATIENT)
Dept: FAMILY MEDICINE | Facility: CLINIC | Age: 80
End: 2025-08-13
Payer: COMMERCIAL

## 2025-08-13 DIAGNOSIS — Z87.891 PERSONAL HISTORY OF TOBACCO USE, PRESENTING HAZARDS TO HEALTH: Primary | ICD-10-CM

## (undated) DEVICE — CATH FOLEY 16FR 5ML LUBRICATH LATEX 0165L16

## (undated) DEVICE — ENDO SNARE EXACTO COLD 9MM LOOP 2.4MMX230CM 00711115

## (undated) DEVICE — SOL WATER IRRIG 1000ML BOTTLE 2F7114

## (undated) DEVICE — MAT FLOOR WATERPROOF BACKSHEET FMBP30

## (undated) DEVICE — GLOVE BIOGEL PI ULTRATOUCH G SZ 7.0 42170

## (undated) DEVICE — SOLUTION IRRIG 2B7127 .9NS 3000ML BAG

## (undated) DEVICE — PREP DYNA-HEX 4% CHG SCRUB 4OZ BOTTLE MDS098710

## (undated) DEVICE — CUSTOM PACK CYSTO PREFERRED SOT5BCYHEA

## (undated) DEVICE — TUBING SET THERMEDX UROLOGY SGL USE LL0006

## (undated) DEVICE — SUCTION MANIFOLD NEPTUNE 2 SYS 1 PORT 702-025-000

## (undated) DEVICE — TUBING SUCTION MEDI-VAC 1/4"X20' N620A

## (undated) DEVICE — ENDO FORCEP ENDOJAW BIOPSY 2.8MMX230CM FB-220U

## (undated) DEVICE — ESU ELEC LOOP HF-RESECTION 24FR MED 30 W/CABLE WA22606S

## (undated) RX ORDER — CEFAZOLIN SODIUM 1 G/3ML
INJECTION, POWDER, FOR SOLUTION INTRAMUSCULAR; INTRAVENOUS
Status: DISPENSED
Start: 2025-02-26

## (undated) RX ORDER — ONDANSETRON 2 MG/ML
INJECTION INTRAMUSCULAR; INTRAVENOUS
Status: DISPENSED
Start: 2025-02-26

## (undated) RX ORDER — PROPOFOL 10 MG/ML
INJECTION, EMULSION INTRAVENOUS
Status: DISPENSED
Start: 2023-08-18

## (undated) RX ORDER — EPHEDRINE SULFATE 50 MG/ML
INJECTION, SOLUTION INTRAMUSCULAR; INTRAVENOUS; SUBCUTANEOUS
Status: DISPENSED
Start: 2025-02-26

## (undated) RX ORDER — FENTANYL CITRATE 50 UG/ML
INJECTION, SOLUTION INTRAMUSCULAR; INTRAVENOUS
Status: DISPENSED
Start: 2025-02-26

## (undated) RX ORDER — DEXAMETHASONE SODIUM PHOSPHATE 10 MG/ML
INJECTION, SOLUTION INTRAMUSCULAR; INTRAVENOUS
Status: DISPENSED
Start: 2025-02-26

## (undated) RX ORDER — PROPOFOL 10 MG/ML
INJECTION, EMULSION INTRAVENOUS
Status: DISPENSED
Start: 2025-02-26

## (undated) RX ORDER — LIDOCAINE HYDROCHLORIDE 10 MG/ML
INJECTION, SOLUTION INFILTRATION; PERINEURAL
Status: DISPENSED
Start: 2023-08-18

## (undated) RX ORDER — LIDOCAINE HYDROCHLORIDE 10 MG/ML
INJECTION, SOLUTION EPIDURAL; INFILTRATION; INTRACAUDAL; PERINEURAL
Status: DISPENSED
Start: 2025-02-26